# Patient Record
Sex: FEMALE | Race: WHITE | NOT HISPANIC OR LATINO | Employment: OTHER
[De-identification: names, ages, dates, MRNs, and addresses within clinical notes are randomized per-mention and may not be internally consistent; named-entity substitution may affect disease eponyms.]

---

## 2017-10-24 ENCOUNTER — DOCUMENTATION ONLY (OUTPATIENT)
Dept: DENTISTRY | Facility: CLINIC | Age: 32
End: 2017-10-24

## 2017-10-24 NOTE — PROGRESS NOTES
Referral to Preoperative Assessment Center from the [facility]    Reason for referral: Long QT syndrome, quadriplegic infantile cerebral palsy    Planned operating room procedures:  Bilateral dental exam, dental radiographs, periodontal therapy, dental restorations, dental extractions, biopsy, frenectomy, gingivectomy, alveoloplasty, fluoride varnish in the mouth.     Anesthesia: General, nasoendotracheal     Date of Surgery:  TBD    The dental care coordinator ([phone]), will call to coordinate the appointment.        Ara Urbano DDS   Clinical   Pager: [phone]

## 2017-12-29 ENCOUNTER — APPOINTMENT (OUTPATIENT)
Dept: SURGERY | Facility: CLINIC | Age: 32
End: 2017-12-29
Payer: MEDICARE

## 2017-12-29 ENCOUNTER — OFFICE VISIT (OUTPATIENT)
Dept: SURGERY | Facility: CLINIC | Age: 32
End: 2017-12-29
Payer: MEDICARE

## 2017-12-29 ENCOUNTER — ANESTHESIA EVENT (OUTPATIENT)
Dept: SURGERY | Facility: CLINIC | Age: 32
End: 2017-12-29
Payer: MEDICARE

## 2017-12-29 ENCOUNTER — ALLIED HEALTH/NURSE VISIT (OUTPATIENT)
Dept: SURGERY | Facility: CLINIC | Age: 32
End: 2017-12-29
Payer: MEDICARE

## 2017-12-29 VITALS
TEMPERATURE: 97.8 F | SYSTOLIC BLOOD PRESSURE: 101 MMHG | OXYGEN SATURATION: 95 % | HEIGHT: 60 IN | WEIGHT: 94 LBS | RESPIRATION RATE: 20 BRPM | BODY MASS INDEX: 18.46 KG/M2 | HEART RATE: 47 BPM | DIASTOLIC BLOOD PRESSURE: 62 MMHG

## 2017-12-29 DIAGNOSIS — Z01.818 PREOP EXAMINATION: Primary | ICD-10-CM

## 2017-12-29 RX ORDER — CEPHALEXIN 500 MG/1
500 CAPSULE ORAL 2 TIMES DAILY
COMMUNITY
Start: 2017-12-27 | End: 2018-01-06

## 2017-12-29 ASSESSMENT — ENCOUNTER SYMPTOMS: DYSRHYTHMIAS: 1

## 2017-12-29 NOTE — PATIENT INSTRUCTIONS
Preparing for Your Surgery      Name:  Robi Remy   MRN:  [id number]   :  1985   Today's Date:  2017     Arriving for surgery:  Surgery date:  17  Arrival time:  06:30  Please come to:     [facility] [address]  [address] [place]  [address]    - parking is available in front of [facility] from 5:15AM to 8:00PM. If you prefer, park your car in the Green Lot.    -Proceed to the 3rd floor, check in at the Adult Surgery Waiting Lounge. [phone]    If an escort is needed stop at the Information Desk in the lobby. Inform the information person that you are here for surgery. An escort to the Adult Surgery Waiting Lounge will be provided.        What can I eat or drink?  -  You may have solid food or milk products until 8 hours prior to your surgery.  -  You may have water, apple juice or 7up/Sprite until 2 hours prior to your surgery.    Which medicines can I take?  -  Do NOT take these medications in the morning, the day of surgery:  Aspirin and supplements x 7 days before surgery, ibuprofen x 2 days before surgery    -  Please take these medications the day of surgery:  Tylenol if needed, nadolol and keflex if normally taken in the morning    How do I prepare myself?  -  Take two showers: one the night before surgery; and one the morning of surgery.         Use Scrubcare or Hibiclens to wash from neck down.  You may use your own shampoo and conditioner. No other hair products.   -  Do NOT use lotion, powder, deodorant, or antiperspirant the day of your surgery.  -  Do NOT wear any makeup, fingernail polish or jewelry.  -  Begin using Incentive Spirometer 1 week prior to surgery.  Use 4 times per day, up to 5 breaths each time.  Bring Incentive Spirometer to hospital.  -Do not bring your own medications to the hospital, except for inhalers and eye drops.  -  Bring your ID and insurance card.    Questions or Concerns:  If you have  questions or concerns, please call the  Preoperative Assessment Center, Monday-Friday 7AM-7PM:  [phone]

## 2017-12-29 NOTE — MR AVS SNAPSHOT
After Visit Summary   2017    Robi Remy    MRN: [id number]           Patient Information     Date Of Birth          1985        Visit Information        Provider Department      2017 2:30 PM Rn, [facility] Preoperative Assessment Center        Care Instructions    Preparing for Your Surgery      Name:  Robi Remy   MRN:  [id number]   :  1985   Today's Date:  2017     Arriving for surgery:  Surgery date:  17  Arrival time:  06:30  Please come to:     [facility] [address]  [address] [place], [place] [address]. [place]  [address]    - parking is available in front of [facility] from 5:15AM to 8:00PM. If you prefer, park your car in the Green Lot.    -Proceed to the 3rd floor, check in at the Adult Surgery Waiting Lounge. [phone]    If an escort is needed stop at the Information Desk in the lobby. Inform the information person that you are here for surgery. An escort to the Adult Surgery Waiting Lounge will be provided.        What can I eat or drink?  -  You may have solid food or milk products until 8 hours prior to your surgery.  -  You may have water, apple juice or 7up/Sprite until 2 hours prior to your surgery.    Which medicines can I take?  -  Do NOT take these medications in the morning, the day of surgery:  Aspirin and supplements x 7 days before surgery, ibuprofen x 2 days before surgery    -  Please take these medications the day of surgery:  Tylenol if needed, nadolol and keflex if normally taken in the morning    How do I prepare myself?  -  Take two showers: one the night before surgery; and one the morning of surgery.         Use Scrubcare or Hibiclens to wash from neck down.  You may use your own shampoo and conditioner. No other hair products.   -  Do NOT use lotion, powder, deodorant, or antiperspirant the day of your surgery.  -  Do NOT wear any makeup, fingernail polish or jewelry.  -  Begin  using Incentive Spirometer 1 week prior to surgery.  Use 4 times per day, up to 5 breaths each time.  Bring Incentive Spirometer to hospital.  -Do not bring your own medications to the hospital, except for inhalers and eye drops.  -  Bring your ID and insurance card.    Questions or Concerns:  If you have questions or concerns, please call the  Preoperative Assessment Center, Monday-Friday 7AM-7PM:  [phone]                    Follow-ups after your visit        Your next 10 appointments already scheduled     Dec 29, 2017  2:30 PM CST   (Arrive by 2:15 PM)   PAC RN ASSESSMENT with  Pac Rn   [facility] Preoperative Assessment Center ([facility])    [address] [facility]  [address] [facility] [address] [phone]            Dec 29, 2017  3:10 PM CST   (Arrive by 2:55 PM)   PAC Anesthesia Consult with  Pac Anesthesiologist   [facility] Preoperative Assessment [place] ([facility])    [address] [place] [address]  [address]th [facility] [id number] [phone]            Dec 29, 2017  3:30 PM CST   LAB with  LAB   [facility] Lab ([facility])    [address] [place] [address]  1st [facility] [id number] [phone]           Please do not eat 10-12 hours before your appointment if you are coming in fasting for labs on lipids, cholesterol, or glucose (sugar). This does not apply to pregnant women. Water, hot tea and black coffee (with nothing added) are okay. Do not drink other fluids, diet soda or chew gum.            Jan 04, 2018   Procedure with Briseida Iyer MD   [facility] [place], Same Day Surgery (--)    [address] [facility] [address] [phone]              Who to contact     Please call your clinic at [phone] to:    Ask questions about your health    Make or cancel appointments    Discuss your medicines    Learn about your test results    Speak to your doctor   If you have compliments or concerns about an  experience at your clinic, or if you wish to file a complaint, please contact [facility] Patient Relations at [phone] or email us at [email or web address]         Additional Information About Your Visit        "[employer] Information     wufoo is an electronic gateway that provides easy, online access to your medical records. With wufoo, you can request a clinic appointment, read your test results, renew a prescription or communicate with your care team.     To sign up for wufoo visit the website at [email or web address]   You will be asked to enter the access code listed below, as well as some personal information. Please follow the directions to create your username and password.     Your access code is: [email or web address]  Expires: 3/15/2018  6:31 AM     Your access code will  in 90 days. If you need help or a new code, please contact your [facility] Clinic or call [phone] for assistance.        Care EveryWhere ID     This is your Care EveryWhere ID. This could be used by other organizations to access your [facility] medical records  [id number]         Blood Pressure from Last 3 Encounters:   17 101/62   14 108/75   10/06/14 109/64    Weight from Last 3 Encounters:   17 42.6 kg (94 lb)   14 38.6 kg (85 lb)   10/06/14 36.3 kg (80 lb)              Today, you had the following     No orders found for display       Primary Care Provider Office Phone # Fax #    Bill Beltrán [phone]       [facility] [address] [facility] [address] [facility] [address]        Equal Access to Services     DEJUAN BERG : Hadii mona Gallo, waaxda luqadaha, qaybta kaalmada judy, erinn long. So [facility] [phone].    ATENCIÓN: Si habla español, tiene a aranda disposición servicios gratuitos de asistencia lingüística. Llame al [phone].    We comply with applicable  federal civil rights laws and [place] laws. We do not discriminate on the basis of race, color, national origin, age, disability, sex, sexual orientation, or gender identity.            Thank you!     Thank you for choosing [facility] PREOPERATIVE ASSESSMENT CENTER  for your care. Our goal is always to provide you with excellent care. Hearing back from our patients is one way we can continue to improve our services. Please take a few minutes to complete the written survey that you may receive in the mail after your visit with us. Thank you!             Your Updated Medication List - Protect others around you: Learn how to safely use, store and throw away your medicines at www.disposemymeds.org.          This list is accurate as of: 12/29/17  2:28 PM.  Always use your most recent med list.                   Brand Name Dispense Instructions for use Diagnosis    cephALEXin 500 MG capsule    KEFLEX     Take 500 mg by mouth 2 times daily        docusate sodium 100 MG capsule    COLACE    180 capsule    Take 1 capsule (100 mg) by mouth 2 times daily    Constipation       GLYCOPYRROLATE PO      Take 2 mg by mouth 3 times daily        MULTIVITAMIN PO      Take 1 tablet by mouth every morning        NADOLOL PO      Take 20 mg by mouth every morning        polyethylene glycol powder    MIRALAX/GLYCOLAX     Take 1 capful by mouth daily        sodium chloride 0.9% (bottle) 0.9 % irrigation     5000 mL    Irrigate bladder with normal saline twice daily as directed.    Neurogenic bladder

## 2017-12-29 NOTE — H&P
Pre-Operative H & P     CC:  Preoperative exam to assess for increased cardiopulmonary risk while undergoing surgery and anesthesia.    Date of Encounter: December 29, 2017   Primary Care Physician:  Bill Beltrán  Robi Remy is a 32 year old female who presents for pre-operative H & P in preparation for a Pap Smear, Dental Exam, Radiographs, Dental Restorations, Periodontal Therapy, Dental Extractions, Biopsies on 1/4/18 with Dr. Briseida Iyer and Ara Urbano DDS at the [facility].      Robi Remy   has a past medical history of Cerebral palsy (H); Developmental delay; Long Q-T syndrome; Neurogenic bladder; Nonverbal; Self-catheterizes urinary bladder; and Spastic quadriplegia (H).  She is wheelchair bound and cannot perform any activities of daily living on her own.      History is obtained from the patient and medical record including Care Everywhere.        Past Surgical History  Past Surgical History:   Procedure Laterality Date     BACK SURGERY  2002    spine fusion     EXAM UNDER ANESTHESIA DENTAL  2010     LAPAROSCOPIC MITROFANOFF PROCEDURE (APPENDIX CONDUIT) N/A 9/4/2014    Procedure: LAPAROSCOPIC MITROFANOFF PROCEDURE (APPENDIX CONDUIT);  Surgeon: Naren Bustos MD;  Location: [facility] OR     LAPAROSCOPIC REPAIR BLADDER N/A 9/4/2014    Procedure: LAPAROSCOPIC REPAIR BLADDER;  Surgeon: Naren Bustos MD;  Location: [facility] OR       Personal or FH with difficulty with Anesthesia:  None    Prior to Admission Medications  Current Outpatient Prescriptions   Medication Sig Dispense Refill     cephALEXin (KEFLEX) 500 MG capsule Take 500 mg by mouth 2 times daily       docusate sodium (COLACE) 100 MG capsule Take 1 capsule (100 mg) by mouth 2 times daily 180 capsule 1     sodium chloride 0.9%, bottle, 0.9 % irrigation Irrigate bladder with normal saline twice daily as directed. 5000 mL 0     GLYCOPYRROLATE PO Take 2 mg by mouth 3 times  daily       Multiple Vitamins-Minerals (MULTIVITAMIN OR) Take 1 tablet by mouth every morning        polyethylene glycol (MIRALAX/GLYCOLAX) powder Take 1 capful by mouth daily       NADOLOL PO Take 20 mg by mouth every morning            Allergies  Tape [adhesive tape]     Social History  Social History     Social History     Marital status: Single     Spouse name: N/A     Number of children: N/A     Years of education: N/A     Occupational History     Not on file.     Social History Main Topics     Smoking status: Never Smoker     Smokeless tobacco: Never Used     Alcohol use No     Drug use: No     Sexual activity: Not on file     Other Topics Concern     Not on file     Social History Narrative          Family History  Family History   Problem Relation Age of Onset     HEART DISEASE Father      long QT syndrome         ROS   The complete review of systems is negative other than noted in the HPI or here.   Constitutional: No  fevers/chills.    EENT: NO difficulty swallowing.  GI: No nausea/vomiting     : UTI being treated with Cephalexin  Hematologic: No anemia or blood clot history  Neurologic: No history of stroke, TIA, migraines, seizures, d  Psychiatric: No changes in mood or affect.      Cardiology Tests: (personally reviewed):   Review Results Below in A/P    Labs: (personally reviewed):  Lab Results   Component Value Date    WBC 13.1 (H) 09/07/2014    HGB 10.8 (L) 09/07/2014    HCT 32.9 (L) 09/07/2014     09/07/2014     09/07/2014    POTASSIUM 3.7 09/07/2014    BRYAN 8.5 09/07/2014     (H) 09/07/2014    CR 0.52 09/07/2014    BUN 20 09/07/2014    CO2 26 09/07/2014           Physical Exam:  No LMP recorded. Patient is not currently having periods (Reason: IUD).   Vital signs:  /62  Pulse (!) 47  Temp 97.8  F (36.6  C) (Axillary)  Resp 20  Ht 1.524 m (5')  Wt 42.6 kg (94 lb)  SpO2 95%  BMI 18.36 kg/m2    Constitutional: Awake, alert, cooperative, no apparent distress, and  appears stated age.  Eyes:  sclera clear, conjunctiva normal.  Respiratory: Clear to auscultation bilaterally, no crackles or wheezing.  Cardiovascular: Regular rate and rhythm, no overt murmur noted.    GI: Normal bowel sounds, soft, non-distended, non-tender  Skin: Warm and dry.  No rashes at anticipated surgical site.   Musculoskeletal: Wheelchair bound    Neurologic: Awake, alert,   Neuropsychiatric: Calm, cooperative. Normal affect.     Assessment/Plan  Robi Remy is a 32 year old female who presents for pre-operative H & P in preparation for a Pap Smear, Dental Exam, Radiographs, Dental Restorations, Periodontal Therapy, Dental Extractions, Biopsies on 1/4/18 with Dr. Briseida Iyer and Ara Urbano DDS at the [facility].    PAC referral for risk assessment and optimization for anesthesia with comorbid conditions of:    Pre-operative considerations:  1.  Cardiac:     Risk of Major Adverse Cardiac event: 0.4%  -Long QT syndrome, takes nadolol (cont DOS)  2.  Neuro:  -Severe cerebral palsy with contractures, nonverbal and developmental delay, wheelchair bound  3.  Pulm:   DANIEL: low risk  4.  GI:  Risk of PONV score =2 .  If > 2, anti-emetic intervention recommended.    Patient is optimized and is an acceptable candidate for the proposed procedure.  No further diagnostic evaluation is needed.      AVS given to patient regarding medication instructions,  surgery time/arrival time and NPO status.  Celeste PACEC   Preoperative Assessment Center  [facility]  Clinic and Surgery Center  Phone: [phone]  Fax: [phone]

## 2017-12-29 NOTE — ANESTHESIA PREPROCEDURE EVALUATION
Anesthesia Evaluation     . Pt has had prior anesthetic. Type: General    No history of anesthetic complications          ROS/MED HX    ENT/Pulmonary:  - neg pulmonary ROS     Neurologic:     (+)Developmental delay  level of function: nonverbal other neuro severe cerebral palsy, spinal fusion 2002   Spinal cord injury: Non-verbal, upper and lower libs contracted, wheel chair bound.   Cardiovascular:     (+) ----. : . . . :. dysrhythmias Long QT, . No previous cardiac testing       METS/Exercise Tolerance: Comment: Patient not capable of doing any activities of daily living on her own 1 - Eating, dressing   Hematologic:  - neg hematologic  ROS       Musculoskeletal:   (+) , , other musculoskeletal- contractures      GI/Hepatic: Comment: Chronic constipation, poor oral intake - neg GI/hepatic ROS       Renal/Genitourinary:     (+) Other Renal/ Genitourinary, neurogenic bladder, current UTI being treated with cephalexin      Endo:  - neg endo ROS       Psychiatric:  - neg psychiatric ROS       Infectious Disease: Comment: Recent UTI's but none since June, had nephrostomy tube;  - neg infectious disease ROS      (-) Recent Fever   Malignancy:      - no malignancy   Other:    (+) other significant disability Wheelchair bound and Developmental delay                   Physical Exam  Normal systems: cardiovascular and pulmonary    Airway     Dental     Cardiovascular   Rhythm and rate: regular and normal      Pulmonary    breath sounds clear to auscultation               PAC Discussion and Assessment    ASA Classification: 3  Case is suitable for: [facility]  Anesthetic techniques and relevant risks discussed: GA  Invasive monitoring and risk discussed:   Types:   Possibility and Risk of blood transfusion discussed:   NPO instructions given:   Additional anesthetic preparation and risks discussed:   Needs early admission to pre-op area:   Other:     PAC Resident/NP Anesthesia Assessment:  Robi Remy is a 32 year old  female who presents for pre-operative H & P in preparation for a Pap Smear, Dental Exam, Radiographs, Dental Restorations, Periodontal Therapy, Dental Extractions, Biopsies on 1/4/18 with Dr. Briseida Iyer and Ara Urbano DDS at the [facility].    PAC referral for risk assessment and optimization for anesthesia with comorbid conditions of:    Pre-operative considerations:  1.  Cardiac:     Risk of Major Adverse Cardiac event: 0.4%  -Long QT syndrome, takes nadolol (cont DOS)  2.  Neuro:  -Severe cerebral palsy with contractures, nonverbal and developmental delay, wheelchair bound  3.  Pulm:   DANIEL: low risk  4.  GI:  Risk of PONV score =2 .  If > 2, anti-emetic intervention recommended.    Patient is optimized and is an acceptable candidate for the proposed procedure.  No further diagnostic evaluation is needed.    Patient also evaluated by Dr. Jaime. See recommendations below.     Celeste Kumar MS, PA-C  12/29/17 2:41 PM        Mid-Level Provider/Resident:   Date:   Time:     Attending Anesthesiologist Anesthesia Assessment:  32 year old for dental work and PAP smear. Chart reviewed, patient seen and evaluated; agree with above assessment. Patient has no significant cardiac or pulmonary disease. Tolerates IVs according to parents.    Patient is appropriate for the planned procedure without further workup or medical management change. The final anesthesia plan will be determined by the physician anesthesiologist caring for the patient on the day of surgery.    Reviewed and Signed by PAC Anesthesiologist  Anesthesiologist: anton  Date: 12/29/2017  Time:   Pass/Fail: Pass  Disposition:     PAC Pharmacist Assessment:        Pharmacist:   Date:   Time:      Anesthesia Plan      History & Physical Review  History and physical reviewed and following examination; no interval change.    ASA Status:  3 .        Plan for General with Propofol induction. Maintenance will be Balanced.     PONV prophylaxis:  Dexamethasone or Solumedrol  Additional equipment: Videolaryngoscope NO ZOFRAN      Postoperative Care  Postoperative pain management:  IV analgesics.      Consents  Anesthetic plan, risks, benefits and alternatives discussed with:  Parent (Mother and/or Father).  Use of blood products discussed: No .   .                          .

## 2018-01-04 ENCOUNTER — HOSPITAL ENCOUNTER (OUTPATIENT)
Facility: CLINIC | Age: 33
Discharge: HOME | End: 2018-01-04
Attending: DENTIST | Admitting: DENTIST
Payer: MEDICARE

## 2018-01-04 ENCOUNTER — ANESTHESIA (OUTPATIENT)
Dept: SURGERY | Facility: CLINIC | Age: 33
End: 2018-01-04
Payer: MEDICARE

## 2018-01-04 VITALS
WEIGHT: 88.85 LBS | OXYGEN SATURATION: 98 % | BODY MASS INDEX: 17.44 KG/M2 | SYSTOLIC BLOOD PRESSURE: 141 MMHG | RESPIRATION RATE: 11 BRPM | HEIGHT: 60 IN | TEMPERATURE: 97.2 F | DIASTOLIC BLOOD PRESSURE: 98 MMHG

## 2018-01-04 DIAGNOSIS — N63.25 BREAST LUMP ON LEFT SIDE AT 9 O'CLOCK POSITION: Primary | ICD-10-CM

## 2018-01-04 DIAGNOSIS — K05.6 PERIODONTAL DISEASE: Primary | ICD-10-CM

## 2018-01-04 PROCEDURE — 71000014 ZZH RECOVERY PHASE 1 LEVEL 2 FIRST HR: Performed by: OBSTETRICS & GYNECOLOGY

## 2018-01-04 PROCEDURE — 25000128 H RX IP 250 OP 636: Performed by: DENTIST

## 2018-01-04 PROCEDURE — A9270 NON-COVERED ITEM OR SERVICE: HCPCS | Performed by: NURSE ANESTHETIST, CERTIFIED REGISTERED

## 2018-01-04 PROCEDURE — 25000565 ZZH ISOFLURANE, EA 15 MIN: Performed by: OBSTETRICS & GYNECOLOGY

## 2018-01-04 PROCEDURE — A9270 NON-COVERED ITEM OR SERVICE: HCPCS | Mod: GY | Performed by: DENTIST

## 2018-01-04 PROCEDURE — 40000170 ZZH STATISTIC PRE-PROCEDURE ASSESSMENT II: Performed by: OBSTETRICS & GYNECOLOGY

## 2018-01-04 PROCEDURE — 27210794 ZZH OR GENERAL SUPPLY STERILE: Performed by: OBSTETRICS & GYNECOLOGY

## 2018-01-04 PROCEDURE — 37000009 ZZH ANESTHESIA TECHNICAL FEE, EACH ADDTL 15 MIN: Performed by: OBSTETRICS & GYNECOLOGY

## 2018-01-04 PROCEDURE — 87624 HPV HI-RISK TYP POOLED RSLT: CPT | Performed by: OBSTETRICS & GYNECOLOGY

## 2018-01-04 PROCEDURE — 37000008 ZZH ANESTHESIA TECHNICAL FEE, 1ST 30 MIN: Performed by: OBSTETRICS & GYNECOLOGY

## 2018-01-04 PROCEDURE — 36000053 ZZH SURGERY LEVEL 2 EA 15 ADDTL MIN - UMMC: Performed by: OBSTETRICS & GYNECOLOGY

## 2018-01-04 PROCEDURE — 71000027 ZZH RECOVERY PHASE 2 EACH 15 MINS: Performed by: OBSTETRICS & GYNECOLOGY

## 2018-01-04 PROCEDURE — G0476 HPV COMBO ASSAY CA SCREEN: HCPCS | Performed by: OBSTETRICS & GYNECOLOGY

## 2018-01-04 PROCEDURE — 25000125 ZZHC RX 250: Performed by: OBSTETRICS & GYNECOLOGY

## 2018-01-04 PROCEDURE — 36000051 ZZH SURGERY LEVEL 2 1ST 30 MIN - UMMC: Performed by: OBSTETRICS & GYNECOLOGY

## 2018-01-04 PROCEDURE — C9399 UNCLASSIFIED DRUGS OR BIOLOG: HCPCS | Performed by: NURSE ANESTHETIST, CERTIFIED REGISTERED

## 2018-01-04 PROCEDURE — 25000128 H RX IP 250 OP 636: Performed by: NURSE ANESTHETIST, CERTIFIED REGISTERED

## 2018-01-04 PROCEDURE — 25000125 ZZHC RX 250: Performed by: DENTIST

## 2018-01-04 PROCEDURE — 25000132 ZZH RX MED GY IP 250 OP 250 PS 637: Mod: GY | Performed by: DENTIST

## 2018-01-04 PROCEDURE — G0145 SCR C/V CYTO,THINLAYER,RESCR: HCPCS | Performed by: OBSTETRICS & GYNECOLOGY

## 2018-01-04 PROCEDURE — 25000125 ZZHC RX 250: Performed by: NURSE ANESTHETIST, CERTIFIED REGISTERED

## 2018-01-04 DEVICE — IUD CONTRACEPTIVE DEVICE MIRENA 50419-4230-01
Type: IMPLANTABLE DEVICE | Site: UTERUS | Status: NON-FUNCTIONAL
Removed: 2024-03-13

## 2018-01-04 RX ORDER — CEFAZOLIN SODIUM 2 G/100ML
2 INJECTION, SOLUTION INTRAVENOUS
Status: COMPLETED | OUTPATIENT
Start: 2018-01-04 | End: 2018-01-04

## 2018-01-04 RX ORDER — LIDOCAINE HYDROCHLORIDE 20 MG/ML
INJECTION, SOLUTION INFILTRATION; PERINEURAL PRN
Status: DISCONTINUED | OUTPATIENT
Start: 2018-01-04 | End: 2018-01-04

## 2018-01-04 RX ORDER — FENTANYL CITRATE 50 UG/ML
25-50 INJECTION, SOLUTION INTRAMUSCULAR; INTRAVENOUS
Status: DISCONTINUED | OUTPATIENT
Start: 2018-01-04 | End: 2018-01-04 | Stop reason: HOSPADM

## 2018-01-04 RX ORDER — SODIUM CHLORIDE, SODIUM LACTATE, POTASSIUM CHLORIDE, CALCIUM CHLORIDE 600; 310; 30; 20 MG/100ML; MG/100ML; MG/100ML; MG/100ML
INJECTION, SOLUTION INTRAVENOUS CONTINUOUS
Status: DISCONTINUED | OUTPATIENT
Start: 2018-01-04 | End: 2018-01-04 | Stop reason: HOSPADM

## 2018-01-04 RX ORDER — CHLORHEXIDINE GLUCONATE ORAL RINSE 1.2 MG/ML
15 SOLUTION DENTAL 2 TIMES DAILY
Qty: 420 ML | Refills: 0 | Status: SHIPPED | OUTPATIENT
Start: 2018-01-04 | End: 2018-01-18

## 2018-01-04 RX ORDER — CHLORHEXIDINE GLUCONATE ORAL RINSE 1.2 MG/ML
SOLUTION DENTAL PRN
Status: DISCONTINUED | OUTPATIENT
Start: 2018-01-04 | End: 2018-01-04 | Stop reason: HOSPADM

## 2018-01-04 RX ORDER — OXYMETAZOLINE HYDROCHLORIDE 0.05 G/100ML
SPRAY NASAL PRN
Status: DISCONTINUED | OUTPATIENT
Start: 2018-01-04 | End: 2018-01-04

## 2018-01-04 RX ORDER — NALOXONE HYDROCHLORIDE 0.4 MG/ML
.1-.4 INJECTION, SOLUTION INTRAMUSCULAR; INTRAVENOUS; SUBCUTANEOUS
Status: DISCONTINUED | OUTPATIENT
Start: 2018-01-04 | End: 2018-01-04 | Stop reason: HOSPADM

## 2018-01-04 RX ORDER — FENTANYL CITRATE 50 UG/ML
INJECTION, SOLUTION INTRAMUSCULAR; INTRAVENOUS PRN
Status: DISCONTINUED | OUTPATIENT
Start: 2018-01-04 | End: 2018-01-04

## 2018-01-04 RX ORDER — BACITRACIN ZINC 500 [USP'U]/G
OINTMENT TOPICAL PRN
Status: DISCONTINUED | OUTPATIENT
Start: 2018-01-04 | End: 2018-01-04 | Stop reason: HOSPADM

## 2018-01-04 RX ORDER — PROPOFOL 10 MG/ML
INJECTION, EMULSION INTRAVENOUS PRN
Status: DISCONTINUED | OUTPATIENT
Start: 2018-01-04 | End: 2018-01-04

## 2018-01-04 RX ORDER — CEFAZOLIN SODIUM 1 G/3ML
1 INJECTION, POWDER, FOR SOLUTION INTRAMUSCULAR; INTRAVENOUS SEE ADMIN INSTRUCTIONS
Status: DISCONTINUED | OUTPATIENT
Start: 2018-01-04 | End: 2018-01-04 | Stop reason: HOSPADM

## 2018-01-04 RX ORDER — DEXAMETHASONE SODIUM PHOSPHATE 4 MG/ML
INJECTION, SOLUTION INTRA-ARTICULAR; INTRALESIONAL; INTRAMUSCULAR; INTRAVENOUS; SOFT TISSUE PRN
Status: DISCONTINUED | OUTPATIENT
Start: 2018-01-04 | End: 2018-01-04

## 2018-01-04 RX ORDER — SODIUM CHLORIDE, SODIUM LACTATE, POTASSIUM CHLORIDE, CALCIUM CHLORIDE 600; 310; 30; 20 MG/100ML; MG/100ML; MG/100ML; MG/100ML
INJECTION, SOLUTION INTRAVENOUS CONTINUOUS PRN
Status: DISCONTINUED | OUTPATIENT
Start: 2018-01-04 | End: 2018-01-04

## 2018-01-04 RX ORDER — KETOROLAC TROMETHAMINE 30 MG/ML
INJECTION, SOLUTION INTRAMUSCULAR; INTRAVENOUS PRN
Status: DISCONTINUED | OUTPATIENT
Start: 2018-01-04 | End: 2018-01-04

## 2018-01-04 RX ADMIN — SODIUM CHLORIDE, POTASSIUM CHLORIDE, SODIUM LACTATE AND CALCIUM CHLORIDE: 600; 310; 30; 20 INJECTION, SOLUTION INTRAVENOUS at 07:49

## 2018-01-04 RX ADMIN — OXYMETAZOLINE HYDROCHLORIDE 3 SPRAY: 5 SPRAY NASAL at 07:55

## 2018-01-04 RX ADMIN — FENTANYL CITRATE 100 MCG: 50 INJECTION, SOLUTION INTRAMUSCULAR; INTRAVENOUS at 07:53

## 2018-01-04 RX ADMIN — PROPOFOL 120 MG: 10 INJECTION, EMULSION INTRAVENOUS at 07:53

## 2018-01-04 RX ADMIN — MIDAZOLAM 0.5 MG: 1 INJECTION INTRAMUSCULAR; INTRAVENOUS at 07:49

## 2018-01-04 RX ADMIN — ROCURONIUM BROMIDE 30 MG: 10 INJECTION INTRAVENOUS at 07:53

## 2018-01-04 RX ADMIN — KETOROLAC TROMETHAMINE 15 MG: 30 INJECTION, SOLUTION INTRAMUSCULAR at 10:05

## 2018-01-04 RX ADMIN — MIDAZOLAM 1 MG: 1 INJECTION INTRAMUSCULAR; INTRAVENOUS at 07:40

## 2018-01-04 RX ADMIN — CEFAZOLIN SODIUM 2 G: 2 INJECTION, SOLUTION INTRAVENOUS at 08:35

## 2018-01-04 RX ADMIN — DEXAMETHASONE SODIUM PHOSPHATE 4 MG: 4 INJECTION, SOLUTION INTRAMUSCULAR; INTRAVENOUS at 08:50

## 2018-01-04 RX ADMIN — LIDOCAINE HYDROCHLORIDE 60 MG: 20 INJECTION, SOLUTION INFILTRATION; PERINEURAL at 07:53

## 2018-01-04 RX ADMIN — SUGAMMADEX 80 MG: 100 INJECTION, SOLUTION INTRAVENOUS at 09:54

## 2018-01-04 NOTE — IP AVS SNAPSHOT
[facility]    [address] [facility] [address]    Phone:  [phone]                                       After Visit Summary   1/4/2018    Robi Remy    MRN: [id number]           After Visit Summary Signature Page     I have received my discharge instructions, and my questions have been answered. I have discussed any challenges I see with this plan with the nurse or doctor.    ..........................................................................................................................................  Patient/Patient Representative Signature      ..........................................................................................................................................  Patient Representative Print Name and Relationship to Patient    ..................................................               ................................................  Date                                            Time    ..........................................................................................................................................  Reviewed by Signature/Title    ...................................................              ..............................................  Date                                                            Time

## 2018-01-04 NOTE — OP NOTE
[facility]  Operative Note    Patient: Robi Remy  : 1985  MRN: [id number]    Date of Service: 2018    Pre-operative diagnosis:  Due for breast, pap, pelvic exams  Due for Mirena IUD exchange  Cerebral palsy with contractures    Post-operative diagnosis:  Same, s/p below procedure    Procedure:   Exam under anesthesia  Bilateral breast exam  Pelvic exam  Removal and replacement of Mirena IUD    Surgeon: Briseida Iyer MD  Assistants: Leyla Gray MD G4; Elham Murillo MD G1    Anesthesia: General    EBL: 1  mL  Urine: not measured  Fluids: 500 mL cystalloid    Specimens: Mirena IUD  Complications: none    Findings: Breast exam with normal appearing, symmetric breasts and nipples. There is a small 0.5 cm nodule palpated at 0900 on the right breast that is well-circumscribed and mobile. There is a 3 x 2 cm fibrocystic mass palpated at about 0900 on the left breast that is mobile. Breasts overall dense. Outpatient breast imaging was recommended. Bimanual exam with anteverted, small, mobile uterus, no adnexal masses palpated. Speculum exam with normal appearing cervix.     IUD Lot #: [id number]  Exp:     Indications: Robi Remy is a 32 year old female with cerebral palsy and significant contractures who was due for dental exam, breast exam, pap smear, and replacement of Mirena IUD. Due to significant contractures and inability to tolerate these exams in the office, she was scheduled for the above procedures under anesthesia. Discussed risks, benefits, and alternatives. The patient's questions were answered, understanding confirmed, and consent obtained.     Technique: The patient was taken to the OR and SCDs placed. After the time-out, a breast exam was performed. The findings are noted above. Next her pelvis was elevated by placing an upside-down bed pan under her buttocks. Her legs were manually elevated to allow exposure. A Bimanual exam was performed. A medium dakota speculum was placed.  The IUD strings were grasped and the IUD removed without difficulty. The pap smear was completed. The cervix was swabbed with betadine x3. A single tooth tenaculum was placed on the anterior lip and the uterus was sounded to 7.5 cm. The Mirena IUD was placed in the usual manner without any difficulty. The strings were trimmed to 2 cm. The single tooth tenaculum was removed and good hemostasis visualized. The speculum was removed. IUD was placed under US guidance.    Dr. Iyer was present for the entire procedure. The patient tolerated the procedure well. Remainder of the case was turned over to the dental service.     Leyla Gray MD PGY4  Department of OB/GYN  1/4/2018 10:36 AM     I was present and scrubbed for entire procedure.   Briseida Iyer MD

## 2018-01-04 NOTE — DISCHARGE INSTRUCTIONS
Same-Day Surgery   Adult Discharge Orders & Instructions     For 24 hours after surgery:  1. Get plenty of rest.  A responsible adult must stay with you for at least 24 hours after you leave the hospital.   2. Pain medication can slow your reflexes. Do not drive or use heavy equipment.  If you have weakness or tingling, don't drive or use heavy equipment until this feeling goes away.  3. Mixing alcohol and pain medication can cause dizziness and slow your breathing. It can even be fatal. Do not drink alcohol while taking pain medication.  4. Avoid strenuous or risky activities.  Ask for help when climbing stairs.   5. You may feel lightheaded.  If so, sit for a few minutes before standing.  Have someone help you get up.   6. If you have nausea (feel sick to your stomach), drink only clear liquids such as apple juice, ginger ale, broth or 7-Up.  Rest may also help.  Be sure to drink enough fluids.  Move to a regular diet as you feel able. Take pain medications with a small amount of solid food, such as toast or crackers, to avoid nausea.   7. A slight fever is normal. Call the doctor if your fever is over 100 F (37.7 C) (taken under the tongue) or lasts longer than 24 hours.  8. You may have a dry mouth, muscle aches, trouble sleeping or a sore throat.  These symptoms should go away after 24 hours.  9. Do not make important or legal decisions.   Pain Management:      1. Take pain medication (if prescribed) for pain as directed by your physician.        2. WARNING: If the pain medication you have been prescribed contains Tylenol  (acetaminophen), DO NOT take additional doses of Tylenol (acetaminophen).     Call your doctor for any of the followin.  Signs of infection (fever, growing tenderness at the surgery site, severe pain, a large amount of drainage or bleeding, foul-smelling drainage, redness, swelling).    2.  It has been over 8 to 10 hours since surgery and you are still not able to urinate (pee).    3.   Headache for over 24 hours.    4.  Numbness, tingling or weakness the day after surgery (if you had spinal anesthesia).  To contact a doctor, call _____________________________________ or:      [phone] and ask for the Resident On Call for:          __________________________________________ (answered 24 hours a day)      Emergency Department:  [place] [facility]: [phone]  [place] [facility]: [phone]

## 2018-01-04 NOTE — ANESTHESIA POSTPROCEDURE EVALUATION
Patient: Robi Remy    Procedure(s):  Pap Smear, Breast exam, Mirena IUD removal and replacement.  Dental Exam, Radiographs, Dental Restorations, Periodontal Therapy, Dental Extractions, Biopsies    - Wound Class: I-Clean  Dental exam, x- rays,  - Wound Class: II-Clean Contaminated  Mirena IUD removal and replacement - Wound Class: I-Clean    Diagnosis:Dental Caries and Periodontal Disease   Diagnosis Additional Information: No value filed.    Anesthesia Type:  General    Note:  Anesthesia Post Evaluation    Patient location during evaluation: PACU  Patient participation: Unable to participate in evaluation secondary to underlying medical condition  Level of consciousness: awake and alert  Pain management: adequate  Airway patency: patent  Cardiovascular status: acceptable  Respiratory status: acceptable  Hydration status: acceptable  PONV: none     Anesthetic complications: None          Last vitals:  Vitals:    01/04/18 0639   BP: 106/82   Resp: 16   Temp: 37  C (98.6  F)   SpO2: 98%         Electronically Signed By: Favio Bradford MD, MD  January 4, 2018  9:06 AM

## 2018-01-04 NOTE — BRIEF OP NOTE
[facility] [place]    Brief Operative Note    Pre-operative diagnosis: Dental Caries and Periodontal Disease   Post-operative diagnosis Periodontal disease  Procedure: Procedure(s):  Pap Smear, Breast exam, Mirena IUD removal and replacement.  Dental Exam, Radiographs, Dental Restorations, Periodontal Therapy, Dental Extractions, Biopsies    - Wound Class: I-Clean  Dental exam, x- rays, periodontal therapy and flouride varnish - Wound Class: II-Clean Contaminated  Mirena IUD removal and replacement - Wound Class: I-Clean  Surgeon: Surgeon(s) and Role:  Panel 1:     * Briseida Iyer MD - Primary     * Leyla Gray MD - Resident - Assisting    Panel 2:     * Ara Urbano DDS - Primary     * Meme Asencio DDS - Resident - Assisting  Anesthesia: General NETT  Estimated blood loss: 5 mL  Drains: None  Specimens:   ID Type Source Tests Collected by Time Destination   A :  Brushing Cervix HPV HIGH RISK TYPES DNA CERVICAL, A PAP THIN LAYER SCREEN Briseida Iyer MD 1/4/2018  8:15 AM      Findings:   None.  Complications: None.  Implants: None.  The patient was extubated in the OR and taken to the PACU in good condition.

## 2018-01-04 NOTE — ANESTHESIA CARE TRANSFER NOTE
Patient: Robi Remy    Procedure(s):  Pap Smear, Breast exam, Mirena IUD removal and replacement.  Dental Exam, Radiographs, Dental Restorations, Periodontal Therapy, Dental Extractions, Biopsies    - Wound Class: I-Clean  Dental exam, x- rays, periodontal therapy and flouride varnish - Wound Class: II-Clean Contaminated  Mirena IUD removal and replacement - Wound Class: I-Clean    Diagnosis: Dental Caries and Periodontal Disease   Diagnosis Additional Information: No value filed.    Anesthesia Type:   General     Note:  Airway :Face Mask  Patient transferred to:PACU  Handoff Report: Identifed the Patient, Identified the Reponsible Provider, Reviewed the pertinent medical history, Discussed the surgical course, Reviewed Intra-OP anesthesia mangement and issues during anesthesia, Set expectations for post-procedure period and Allowed opportunity for questions and acknowledgement of understanding      Vitals: (Last set prior to Anesthesia Care Transfer)    CRNA VITALS  1/4/2018 0956 - 1/4/2018 1032      1/4/2018             Pulse: 82    SpO2: 99 %    Resp Rate (observed): 10                Electronically Signed By: LINDA Craven CRNA  January 4, 2018  10:32 AM

## 2018-01-04 NOTE — PROGRESS NOTES
Mitrofanoff last used at 0500. Mom reports during the day, it is accessed every 4-5 hours. OR nurse aware and will cath

## 2018-01-04 NOTE — BRIEF OP NOTE
[facility] OB/GYN Brief Operative Note    Pre-operative diagnosis: Due for breast, pap, pelvic exams  Due for Mirena IUD exchange  Cerebral palsy with contractures     Post-operative diagnosis: Same  S/p below procedure     Procedure: Procedure(s):  Exam under anesthesia  Bilateral breast exam  Pelvic exam  Removal and replacement of Mirena IUD     Surgeon: Dr. Iyer   Assistant(s): Leyla Gray MD G4, Elham Murillo MD G1      Anesthesia: General endotracheal anesthesia     Estimated blood loss: 1 mL   Total IV fluids: 500 mL, crystalloid   Blood transfusion: no transfusion was given during surgery   Total urine output: Not emptied   Drains: none   Specimens: Mirena IUD   Implants: none   Complications: None apparent    Condition: Patient taken to recovery in stable condition.     Findings: Breast exam with normal appearing, symmetric breasts and nipples. There is a small 0.5 cm nodule palpated at 0900 on the right breast that is well-circumscribed and mobile. There is a 3 x 2 cm fibrocystic mass palpated at about 0900 on the left breast that is mobile. Breasts overall dense. Bimanual exam with anteverted, small, mobile uterus, no adnexal masses palpated. Speculum exam with normal appearing cervix.     IUD Lot #: [id number]  Exp: 06/20    Patient stable at end of procedure.      Leyla Gray MD 1/4/2018 8:28 AM   OB/GYN Resident G4

## 2018-01-04 NOTE — IP AVS SNAPSHOT
MRN:[id number]                      After Visit Summary   1/4/2018    Robi Remy    MRN: [id number]           Thank you!     Thank you for choosing [facility] for your care. Our goal is always to provide you with excellent care. Hearing back from our patients is one way we can continue to improve our services. Please take a few minutes to complete the written survey that you may receive in the mail after you visit with us. Thank you!        Patient Information     Date Of Birth          1985        About your hospital stay     You were admitted on:  January 4, 2018 You last received care in the:   PACU    You were discharged on:  January 4, 2018       Who to Call     For medical emergencies, please call 911.  For non-urgent questions about your medical care, please call your primary care provider or clinic, [phone]  For questions related to your surgery, please call your surgery clinic        Attending Provider     Provider Ara Sharma DDS Dentist       Primary Care Provider Office Phone # Fax #    Bill Beltrán [phone]      After Care Instructions     Discharge Instructions       Return to [facility] dental clinic in 6- 12 months for recall and follow up.  Call the dental clinic to schedule the appointment.  Clinic phone: [phone]  Emergency post op care (after business hours and weekends) call: [phone], and ask for the dental resident on call.    Procedures Performed Today (January 4, 2018)  Dental exam, dental x-rays, cleaning, fluoride varnish.     Post-operative oral surgery instructions  Care of the mouth following a surgical procedure is essential in the healing process.  There is a certain amount of swelling, discoloration, discomfort and bleeding which can be expected.     Swelling:  Some degree of swelling is normal and can be minimized with the use of ice or cold packs applied to the area for 15-20 minutes and then removed for  15-20 minutes.  This should only be done for the first 24 hours, after 24 hours use a warm moist compress over the area for 15-20 minutes and then remove for 15-20 minutes.  Sit upright and keep your head elevated when sleeping.  Maximum swelling will occur about the second or third post-operative day and then slowly recede. Once present, it can remain swollen for up to 7 days and discomfort may persist for 10 days.    Discomfort:   A variable amount of pain follows extraction and oral surgery procedures. Tylenol, ibuprofen, or any over the counter pain medication can be used. In some cases, prescription pain medication will be given which  should be taken exactly as directed, and taken before the local anesthetic wears off. If pain increases for more than 3 days call the clinic.   Do not take pain medication on an empty stomach as it may cause nausea.     Bleeding:  Some bleeding and oozing is to be expected for several hours.  Avoid spitting, rinsing, swishing, and use of a straw for the next 24-48 hours, as they may provoke oozing.  If bleeding is visible then place a moistened gauze pack over the area and keep firm pressure on the gauze pack for 30 minutes and then discard.     Discoloration:   Facial discoloration (black and blue bruising) often follows oral surgery procedures. Discoloration is normal and is no cause for alarm. It may persist for as long as several weeks.    Jaw Stiffness:   For several days following most oral surgical procedures, the jaw may become somewhat stiff. Should jaw stiffness worsen after 2  weeks, call the clinic.    Nausea:    Nausea is common after surgery, and it is sometimes caused by pain medicines. Nausea may be reduced by preceding each pill with a small amount of soft food, then taking the pill with a large volume of water. Continue consuming clear fluids and minimize the pain medication. Call if you don't feel better or if vomiting is a problem. Soft drinks that have less  carbonation may help with nausea.    Care of the mouth:  A day following surgery rinse with warm salt water after each meal or 3-4 times a day (One half teaspoonful of table salt in a full glass of warm water). Resume normal oral hygiene (brushing and flossing) within 24 hours after procedure.  Clean your teeth within the bounds of comfort.   Avoid use of alcohol, smoking, or carbonated drinks for 24-48 hours after surgery.  This may slow the healing process.      Diet:  A soft or liquid diet is recommended for the first few days following surgery, advance as tolerated. Until local anesthesia (numbness) wears off, be careful chewing to prevent biting the numb area. Eat soft and liquid foods such as yogurt, cottage cheese, soup etc. Try not to skip a meal, and keep up normal diet.    Rest:  Rest as much as possible for the next 24 hours, avoiding any excessive amount of physical activity.     Fluoride Varnish:  A 5% sodium fluoride varnish was placed over the teeth for the prevention of dental decay.  The varnish hardens on contact with saliva so the teeth may appear spotty or as if there is a thin film coating the teeth, this is normal.  The varnish should remain on your teeth for at least 4-6 hours for the maximum effect.  It is recommended that you only eat soft foods and drink cold liquids during this time, do not eat or drink anything hot and do not brush your teeth the day of your surgery.  The varnish with naturally wear away, and can be brushed off the next day.     Note:  1. Return to work or school in 24-48 hours                  Further instructions from your care team       Same-Day Surgery   Adult Discharge Orders & Instructions     For 24 hours after surgery:  1. Get plenty of rest.  A responsible adult must stay with you for at least 24 hours after you leave the hospital.   2. Pain medication can slow your reflexes. Do not drive or use heavy equipment.  If you have weakness or tingling, don't drive or  use heavy equipment until this feeling goes away.  3. Mixing alcohol and pain medication can cause dizziness and slow your breathing. It can even be fatal. Do not drink alcohol while taking pain medication.  4. Avoid strenuous or risky activities.  Ask for help when climbing stairs.   5. You may feel lightheaded.  If so, sit for a few minutes before standing.  Have someone help you get up.   6. If you have nausea (feel sick to your stomach), drink only clear liquids such as apple juice, ginger ale, broth or 7-Up.  Rest may also help.  Be sure to drink enough fluids.  Move to a regular diet as you feel able. Take pain medications with a small amount of solid food, such as toast or crackers, to avoid nausea.   7. A slight fever is normal. Call the doctor if your fever is over 100 F (37.7 C) (taken under the tongue) or lasts longer than 24 hours.  8. You may have a dry mouth, muscle aches, trouble sleeping or a sore throat.  These symptoms should go away after 24 hours.  9. Do not make important or legal decisions.   Pain Management:      1. Take pain medication (if prescribed) for pain as directed by your physician.        2. WARNING: If the pain medication you have been prescribed contains Tylenol  (acetaminophen), DO NOT take additional doses of Tylenol (acetaminophen).     Call your doctor for any of the followin.  Signs of infection (fever, growing tenderness at the surgery site, severe pain, a large amount of drainage or bleeding, foul-smelling drainage, redness, swelling).    2.  It has been over 8 to 10 hours since surgery and you are still not able to urinate (pee).    3.  Headache for over 24 hours.    4.  Numbness, tingling or weakness the day after surgery (if you had spinal anesthesia).  To contact a doctor, call _____________________________________ or:      [phone] and ask for the Resident On Call for:          __________________________________________ (answered 24 hours a day)      Emergency  "Department:  [place] [facility]: [phone]  [place] [facility]: [phone]          Additional Information     If you use hormonal birth control (such as the pill, patch, ring or implants): You'll need a second form of birth control for 7 days (condoms, a diaphragm or contraceptive foam). While in the hospital, you received a medicine called Bridion. Your normal birth control will not work as well for a week after taking this medicine.          Pending Results     Date and Time Order Name Status Description    2018 0834 A pap thin layer screen In process     2018 0834 HPV High Risk Types DNA Cervical In process             Admission Information     Date & Time Provider Department Dept. Phone    2018 Ara Urbano, [facility]  PACU [phone]      Your Vitals Were     Blood Pressure Temperature Respirations Height Weight Pulse Oximetry    145/102 98.6  F (37  C) 12 1.524 m (5') 40.3 kg (88 lb 13.5 oz) 100%    BMI (Body Mass Index)                   17.35 kg/m2           [employer] Information     [employer] lets you send messages to your doctor, view your test results, renew your prescriptions, schedule appointments and more. To sign up, go to [email or web address] . Click on \"Log in\" on the left side of the screen, which will take you to the Welcome page. Then click on \"Sign up Now\" on the right side of the page.     You will be asked to enter the access code listed below, as well as some personal information. Please follow the directions to create your username and password.     Your access code is: [email or web address]  Expires: 3/15/2018  6:31 AM     Your access code will  in 90 days. If you need help or a new code, please call your [place] clinic or [phone].        Care EveryWhere ID     This is your Care EveryWhere ID. This could be used by other organizations to access your [place] medical records  [id number]        Equal Access to Services     DEJUAN BERG AH: " Hadii mona tubbso Soomaali, waaxda luqadaha, qaybta kaalmada judy, erinn andrewin hayaan bardleyesther pritchard lajosuerodrigue ethan. So [facility] [phone].    ATENCIÓN: Si arthur handy, tiene a aranda disposición servicios gratuitos de asistencia lingüística. Llame al [phone].    We comply with applicable federal civil rights laws and [place] laws. We do not discriminate on the basis of race, color, national origin, age, disability, sex, sexual orientation, or gender identity.               Review of your medicines      START taking        Dose / Directions    chlorhexidine 0.12 % solution   Commonly known as:  PERIDEX   Used for:  Periodontal disease        Dose:  15 mL   Swish and spit 15 mLs in mouth 2 times daily for 14 days Can brush or swab on to gums twice a day if patient does not tolerate rinsing.   Quantity:  420 mL   Refills:  0         CONTINUE these medicines which have NOT CHANGED        Dose / Directions    cephALEXin 500 MG capsule   Commonly known as:  KEFLEX        Dose:  500 mg   Take 500 mg by mouth 2 times daily   Refills:  0       docusate sodium 100 MG capsule   Commonly known as:  COLACE   Used for:  Constipation        Dose:  100 mg   Take 1 capsule (100 mg) by mouth 2 times daily   Quantity:  180 capsule   Refills:  1       GLYCOPYRROLATE PO        Dose:  2 mg   Take 2 mg by mouth 3 times daily   Refills:  0       MULTIVITAMIN PO        Dose:  1 tablet   Take 1 tablet by mouth every morning   Refills:  0       NADOLOL PO        Dose:  20 mg   Take 20 mg by mouth every morning   Refills:  0       polyethylene glycol powder   Commonly known as:  MIRALAX/GLYCOLAX        Dose:  1 capful   Take 1 capful by mouth daily   Refills:  0       sodium chloride 0.9% (bottle) 0.9 % irrigation   Used for:  Neurogenic bladder        Irrigate bladder with normal saline twice daily as directed.   Quantity:  5000 mL   Refills:  0            Where to get your medicines      Some of these will need a paper prescription and  others can be bought over the counter. Ask your nurse if you have questions.     Bring a paper prescription for each of these medications     chlorhexidine 0.12 % solution                Protect others around you: Learn how to safely use, store and throw away your medicines at www.disposemymeds.org.             Medication List: This is a list of all your medications and when to take them. Check marks below indicate your daily home schedule. Keep this list as a reference.      Medications           Morning Afternoon Evening Bedtime As Needed    cephALEXin 500 MG capsule   Commonly known as:  KEFLEX   Take 500 mg by mouth 2 times daily                                chlorhexidine 0.12 % solution   Commonly known as:  PERIDEX   Swish and spit 15 mLs in mouth 2 times daily for 14 days Can brush or swab on to gums twice a day if patient does not tolerate rinsing.   Last time this was given:  15 mLs on 1/4/2018  8:47 AM                                docusate sodium 100 MG capsule   Commonly known as:  COLACE   Take 1 capsule (100 mg) by mouth 2 times daily                                GLYCOPYRROLATE PO   Take 2 mg by mouth 3 times daily                                MULTIVITAMIN PO   Take 1 tablet by mouth every morning                                NADOLOL PO   Take 20 mg by mouth every morning                                polyethylene glycol powder   Commonly known as:  MIRALAX/GLYCOLAX   Take 1 capful by mouth daily                                sodium chloride 0.9% (bottle) 0.9 % irrigation   Irrigate bladder with normal saline twice daily as directed.

## 2018-01-05 ENCOUNTER — TELEPHONE (OUTPATIENT)
Dept: OBGYN | Facility: CLINIC | Age: 33
End: 2018-01-05

## 2018-01-05 DIAGNOSIS — N63.10 LUMP OF RIGHT BREAST: Primary | ICD-10-CM

## 2018-01-05 DIAGNOSIS — N64.59 OTHER SIGNS AND SYMPTOMS IN BREAST (CODE): ICD-10-CM

## 2018-01-05 DIAGNOSIS — Z03.89 ENCOUNTER FOR OBSERVATION FOR OTHER SUSPECTED DISEASES AND CONDITIONS RULED OUT: ICD-10-CM

## 2018-01-05 LAB
COPATH REPORT: NORMAL
PAP: NORMAL

## 2018-01-05 NOTE — TELEPHONE ENCOUNTER
----- Message from Drake Gallegos sent at 1/5/2018 10:22 AM CST -----  Regarding: Call pt's mother- Breast Center Referral   Contact: [phone]  Aileen, pt's mother would like a call back for clarification about whether the pt should see the breast specialist at Breast Center or just get a diagnostic mammo and US to start with for the lump in breast. If the pt is advise to just get imaging, orders for the diagnostic and US needs to be place before scheduling. Please have them contact Cain MONTERO at [phone] for scheduling this type of mammo. Aileen can be reached [phone]. Thank you.    GY    Please DO NOT send this message and/or reply back to sender.  Call Center Representatives DO NOT respond to messages.

## 2018-01-05 NOTE — OP NOTE
It was deemed necessary for this patient to be seen in the hospital operating room because patient had quadriplegic infantile cerebral palsy and thus the inability to be treated in a traditional dental clinic setting.    Summary:  Under general anesthesia, the following operations were performed in the mouth: Bilateral dental examination, dental radiographs, prophylaxis and fluoride varnish application.    Names:  The attending physician was Dr. Ara Urbano DDS. The first assistant dental resident was Meme Asencio DDS. The anesthesiologist was Favio Bradford MD.  The scrub nurse was Eliza. The circulating nurse was Courtney. The CRNA was Ana María.    Diagnosis:  The pre-operative diagnosis was suspected periodontal disease and dental caries.  The post-operative diagnosis was periodontal disease.     General Anesthesia Start:  The patient was brought into the operating room and draped in the usual customary [facility] fashion. Following the time-out procedures, general anesthesia was administered through the patient s left naris.    Examination:  A bilateral dental exam was performed and full mouth series of 14 periapical and 4 bitewing radiographs were obtained and interpreted. A moist throat pack was placed at 09:30 am.    Observations:  Clinical examination revealed generalized moderate plaque, generalized bleeding on probing and periodontal pockets ranging from 3-6mm.  Radiographic examination revealed normal bone trabeculation.     Procedure:  Combined case with GYN. GYN performed a PAP smear first and then it was followed by dental procedures.    The following procedures were performed:    Prophylaxis was performed using ultrasonic scalers followed by rubber cup polishing and flossing.    Fluoride varnish was applied to all teeth.    The throat pack was removed with suction at 10:02 am. The oropharynx was inspected and found to be clear. Estimated blood loss was 5ccs. The attending doctor,   Ara Urbano was present for the entire procedure.    The patient was extubated in the operating room and taken to the post-anesthesia care unit in good condition.     Ara Urbano DDS    As dicated by Meme Asencio DDS

## 2018-01-05 NOTE — TELEPHONE ENCOUNTER
Spoke with pt mom, Aileen, and informed her that just imaging is needed and orders will be placed for bilateral mammogram and ultrasound on the right side. She indicated understanding and agreed with plan.

## 2018-01-09 LAB
FINAL DIAGNOSIS: NORMAL
HPV HR 12 DNA CVX QL NAA+PROBE: NEGATIVE
HPV16 DNA SPEC QL NAA+PROBE: NEGATIVE
HPV18 DNA SPEC QL NAA+PROBE: NEGATIVE
SPECIMEN DESCRIPTION: NORMAL

## 2018-01-10 ENCOUNTER — RADIANT APPOINTMENT (OUTPATIENT)
Dept: MAMMOGRAPHY | Facility: CLINIC | Age: 33
End: 2018-01-10
Attending: OBSTETRICS & GYNECOLOGY
Payer: MEDICARE

## 2018-01-10 DIAGNOSIS — N64.59 OTHER SIGNS AND SYMPTOMS IN BREAST (CODE): ICD-10-CM

## 2018-01-10 DIAGNOSIS — N63.0 BREAST LUMP IN UPPER INNER QUADRANT: ICD-10-CM

## 2018-01-10 DIAGNOSIS — N63.10 LUMP OF RIGHT BREAST: ICD-10-CM

## 2018-01-29 ENCOUNTER — TELEPHONE (OUTPATIENT)
Dept: OBGYN | Facility: CLINIC | Age: 33
End: 2018-01-29

## 2018-01-29 DIAGNOSIS — N63.0 BREAST LUMP IN UPPER INNER QUADRANT: Primary | ICD-10-CM

## 2018-01-29 DIAGNOSIS — N63.12 MASS OF UPPER INNER QUADRANT OF RIGHT BREAST: ICD-10-CM

## 2018-01-29 DIAGNOSIS — N63.10 LUMP OF RIGHT BREAST: ICD-10-CM

## 2018-01-29 NOTE — TELEPHONE ENCOUNTER
----- Message from Stephania Ochoa, LAWRENCE sent at 1/29/2018  3:17 PM CST -----  Regarding: FW: Orders for an Ultrasound  Contact: [phone]   Mom's mailbox is full. Try again  -Stephania  ----- Message -----     From: Ngozi Persaud RN     Sent: 1/26/2018  10:25 AM       To: [facility]  Subject: FW: Orders for an Ultrasound                     Called and left message on voicemail for phone number delfino in message and on patient chart. Name on voicemail did not sound like mother's name, Aileen, so left general message to call triage.    ----- Message -----     From: Ольга Hoff     Sent: 1/25/2018   4:15 PM       To: [facility]  Subject: Orders for an Ultrasound                         Good afternoon,    Patient's mom called about getting an order from Dr. Iyer for an ultrasound at the breast center. Please f/u with the patient's mom at [phone].    Thank you  Ольга Hoff  Call Center   Please DO NOT send this message and/or reply back to sender. Call Center Representatives DO NOT respond to messages

## 2018-01-31 NOTE — TELEPHONE ENCOUNTER
Orders placed for breast US. Pt will need to follow up in 6 months for repeat breast US per recommendation after breast US on 1/10/18. Left VM on mothers home phone.

## 2018-07-10 ENCOUNTER — RADIANT APPOINTMENT (OUTPATIENT)
Dept: MAMMOGRAPHY | Facility: CLINIC | Age: 33
End: 2018-07-10
Attending: OBSTETRICS & GYNECOLOGY
Payer: MEDICARE

## 2018-07-10 DIAGNOSIS — N63.10 LUMP OF RIGHT BREAST: ICD-10-CM

## 2018-07-10 DIAGNOSIS — N63.12 MASS OF UPPER INNER QUADRANT OF RIGHT BREAST: ICD-10-CM

## 2018-07-10 DIAGNOSIS — N63.0 BREAST LUMP IN UPPER INNER QUADRANT: ICD-10-CM

## 2019-01-10 ENCOUNTER — ANCILLARY PROCEDURE (OUTPATIENT)
Dept: MAMMOGRAPHY | Facility: CLINIC | Age: 34
End: 2019-01-10
Payer: MEDICARE

## 2019-01-10 DIAGNOSIS — N63.10 LUMP OF RIGHT BREAST: ICD-10-CM

## 2019-01-10 DIAGNOSIS — N63.12 MASS OF UPPER INNER QUADRANT OF RIGHT BREAST: ICD-10-CM

## 2019-01-10 DIAGNOSIS — Z09 FOLLOW UP: ICD-10-CM

## 2019-01-10 DIAGNOSIS — N63.0 BREAST LUMP IN UPPER INNER QUADRANT: ICD-10-CM

## 2019-04-21 ENCOUNTER — TRANSFERRED RECORDS (OUTPATIENT)
Dept: HEALTH INFORMATION MANAGEMENT | Facility: CLINIC | Age: 34
End: 2019-04-21

## 2019-04-22 ENCOUNTER — HOSPITAL ENCOUNTER (EMERGENCY)
Facility: CLINIC | Age: 34
Discharge: HOME | End: 2019-04-22
Attending: EMERGENCY MEDICINE | Admitting: EMERGENCY MEDICINE
Payer: MEDICARE

## 2019-04-22 ENCOUNTER — OFFICE VISIT (OUTPATIENT)
Dept: UROLOGY | Facility: CLINIC | Age: 34
End: 2019-04-22
Payer: MEDICARE

## 2019-04-22 VITALS
DIASTOLIC BLOOD PRESSURE: 74 MMHG | BODY MASS INDEX: 17.47 KG/M2 | SYSTOLIC BLOOD PRESSURE: 98 MMHG | HEIGHT: 60 IN | RESPIRATION RATE: 16 BRPM | TEMPERATURE: 99.1 F | HEART RATE: 105 BPM | WEIGHT: 89 LBS | OXYGEN SATURATION: 96 %

## 2019-04-22 VITALS — DIASTOLIC BLOOD PRESSURE: 52 MMHG | SYSTOLIC BLOOD PRESSURE: 98 MMHG | HEART RATE: 43 BPM

## 2019-04-22 DIAGNOSIS — N31.9 NEUROGENIC BLADDER: Primary | ICD-10-CM

## 2019-04-22 DIAGNOSIS — Z93.52 MITROFANOFF APPENDICOVESICOSTOMY PRESENT (H): ICD-10-CM

## 2019-04-22 PROCEDURE — 99284 EMERGENCY DEPT VISIT MOD MDM: CPT | Mod: Z6 | Performed by: EMERGENCY MEDICINE

## 2019-04-22 PROCEDURE — 99283 EMERGENCY DEPT VISIT LOW MDM: CPT | Performed by: EMERGENCY MEDICINE

## 2019-04-22 RX ORDER — CEFDINIR 300 MG/1
300 CAPSULE ORAL 2 TIMES DAILY
Qty: 14 CAPSULE | Refills: 0 | Status: SHIPPED | OUTPATIENT
Start: 2019-04-22 | End: 2019-04-29

## 2019-04-22 ASSESSMENT — PAIN SCALES - GENERAL
PAINLEVEL: NO PAIN (0)
PAINLEVEL: NO PAIN (0)

## 2019-04-22 ASSESSMENT — MIFFLIN-ST. JEOR: SCORE: 1035.2

## 2019-04-22 ASSESSMENT — ENCOUNTER SYMPTOMS: DIFFICULTY URINATING: 1

## 2019-04-22 NOTE — ED NOTES
Sign out from Dr. Baker at 7:15AM    Situation:  33-year-old female with a history of cerebral palsy and quadriplegia.  Has a Mitrofanoff for catheterization of urine.  Presented to an outside ED with inability to cath per the Mitrofanoff.  They were able to place a urethral Edouard catheter.  Blood cell count elevated to 19.3.  Treated with 1 dose of IV cefepime prior to transfer for urology evaluation.    Plan: Awaiting urology consultation.    Events:   7:55 AM  The patient has been seen by urology.  They are satisfied that the urethral catheter is in appropriate position.  Gentle irrigation was performed which has remove debris from the bladder.  Given her increased white blood cell count it was recommended to continue outpatient oral antibiotics.  We reviewed her chart together and noted a history of long QT syndrome which make use of quinolones unsafe.  I decided to start a course of oral Omnicef twice daily.  Neurology will arrange for outpatient follow-up with Dr. Bustos for evaluation of the Mitrofanoff channel in the meantime they are to continue the Edouard catheter in the urethra into irrigated daily.  I checked in with the patient's mother and they do have irrigation supplies at home.    Discharged.    MD MALCOLM Quinteros, Brigitte Hilton MD  04/22/19 9784

## 2019-04-22 NOTE — DISCHARGE INSTRUCTIONS
Thank you for coming to the [facility] Emergency Department.     Keep the baig in place through the urethra.   Flush the catheter daily with sterile water or saline.     Expect a phone call from our Urology Clinic, Dr. Mullins, for follow up evaluation of the Mitrofanoff.     Start the antibiotic Cefdinir twice daily for the next 7 days. Return to the ER for signs of systemic infection, like fevers >100.4F, sweating, vomiting.

## 2019-04-22 NOTE — PATIENT INSTRUCTIONS
"Follow up as scheduled in [place].        It was a pleasure meeting with you today.  Thank you for allowing me and my team the privilege of caring for you today.  YOU are the reason we are here, and I truly hope we provided you with the excellent service you deserve.  Please let us know if there is anything else we can do for you so that we can be sure you are leaving completely satisfied with your care experience.          AFTER YOUR CYSTOSCOPY        You have just completed a cystoscopy, or \"cysto\", which allowed your physician to learn more about your bladder (or to remove a stent placed after surgery). We suggest that you continue to avoid caffeine, fruit juice, and alcohol for the next 24 hours, however, you are encouraged to return to your normal activities.         A few things that are considered normal after your cystoscopy:     * Small amount of bleeding (or spotting) that clears within the next 24 hours     * Slight burning sensation with urination     * Sensation to of needing to avoid more frequently     * The feeling of \"air\" in your urine     * Mild discomfort that is relieved with Tylenol        Please contact our office promptly if you:     * Develop a fever above 101 degrees     * Are unable to urinate     * Develop bright red blood that does not stop     * Severe pain or swelling         Please contact our office with any concerns or questions @DEPTPHN.  "

## 2019-04-22 NOTE — ED AVS SNAPSHOT
[facility], [place], Emergency Department  [address] [place] [id number]  Phone:  [phone]                                    Robi Remy   MRN: [id number]    Department:  [facility], Emergency Department   Date of Visit:  4/22/2019           After Visit Summary Signature Page    I have received my discharge instructions, and my questions have been answered. I have discussed any challenges I see with this plan with the nurse or doctor.    ..........................................................................................................................................  Patient/Patient Representative Signature      ..........................................................................................................................................  Patient Representative Print Name and Relationship to Patient    ..................................................               ................................................  Date                                   Time    ..........................................................................................................................................  Reviewed by Signature/Title    ...................................................              ..............................................  Date                                               Time          22EPIC Rev 08/18

## 2019-04-22 NOTE — NURSING NOTE
Chief Complaint   Patient presents with     Cystoscopy     difficulty catheterizing mitrofanoff       Blood pressure 98/52, pulse (!) 43. There is no height or weight on file to calculate BMI.    Patient Active Problem List   Diagnosis     Neurogenic bladder       Allergies   Allergen Reactions     Tape [Adhesive Tape]      Skin blistering  Tape had been on for one month without changing. Unsure what tape       Current Outpatient Medications   Medication Sig Dispense Refill     cefdinir (OMNICEF) 300 MG capsule Take 1 capsule (300 mg) by mouth 2 times daily for 7 days 14 capsule 0     docusate sodium (COLACE) 100 MG capsule Take 1 capsule (100 mg) by mouth 2 times daily 180 capsule 1     GLYCOPYRROLATE PO Take 2 mg by mouth 3 times daily       Multiple Vitamins-Minerals (MULTIVITAMIN OR) Take 1 tablet by mouth every morning        NADOLOL PO Take 20 mg by mouth every morning        polyethylene glycol (MIRALAX/GLYCOLAX) powder Take 1 capful by mouth daily       sodium chloride 0.9%, bottle, 0.9 % irrigation Irrigate bladder with normal saline twice daily as directed. 5000 mL 0       Social History     Tobacco Use     Smoking status: Never Smoker     Smokeless tobacco: Never Used   Substance Use Topics     Alcohol use: No     Drug use: No       Invasive Procedure Safety Checklist:    Procedure: cystoscopy    Action: Complete sections and checkboxes as appropriate.    Pre-procedure:  1. Patient ID Verified with 2 identifiers (Debra and  or MRN) : YES    2. Procedure and site verified with patient/designee (when able) : YES    3. Accurate consent documentation in medical record : YES    4. H&P (or appropriate assessment) documented in medical record : N/A  H&P must be up to 30 days prior to procedure an updated within 24 hours of                 Procedure as applicable.     5. Relevant diagnostic and radiology test results appropriately labeled and displayed as applicable : YES    6. Blood products, implants,  devices, and/or special equipment available for the procedure as applicable : YES    7. Procedure site(s) marked with provider initials [Exclusions: none] : NO    8. Marking not required. Reason : Yes  Procedure does not require site marking    Time Out:     Time-Out performed immediately prior to starting procedure, including verbal and active participation of all team members addressing: YES    1. Correct patient identity.  2. Confirmed that the correct side and site are marked.  3. An accurate procedure to be done.  4. Agreement on the procedure to be done.  5. Correct patient position.  6. Relevant images and results are properly labeled and appropriately displayed.  7. The need to administer antibiotics or fluids for irrigation purposes during the procedure as applicable.  8. Safety precautions based on patient history or medication use.    During Procedure: Verification of correct person, site, and procedure occurs any time the responsibility for care of the patient is transferred to another member of the care team.    No medications given during this visit.    Aminah Levin LPN  4/22/2019  12:26 PM      Removal of baig:  22 Fr straight tipped latex baig catheter removed from urethral meatus without difficulty.      One Cipro 500 mg given per protocol: No, patient currently starting to take omnicef.       Aminah Levin LPN  4/22/2019  1:01 PM

## 2019-04-22 NOTE — ED PROVIDER NOTES
History     Chief Complaint   Patient presents with     urology problem     HPI  Robi Remy is a 33 year old female with a history of cerebral palsy, nonverbal, neurogenic bladder status post bladder augmentation with Mitrofanoff, amongst others, who presents from [place] [facility] for urology consult.  She presented there with urinary retention.  She does have a Mitrofanoff and caregivers reported they were unable to catheter Mitrofanoff to drain her bladder.  Staff at the [facility] ED attempted to catheter her urethra, initially reported they were unsuccessful in getting urine out, felt there was clot or debris present in the bladder.  They spoke with urology here who recommended transfer here for evaluation in the ED by the urology resident.  In the interim they apparently were able to pass a different Edouard catheter through her urethra which did drain some urine.  She did have a high white blood cell count there, and was given a dose of cefepime empirically.  The patient is nonverbal and is not able to provide any further history at this time.  There is no other family members or staff here with her at this time.    Past Medical History:   Diagnosis Date     Cerebral palsy (H)      Developmental delay      Long Q-T syndrome     takes nadolol     Neurogenic bladder      Nonverbal      Self-catheterizes urinary bladder     Parents Cath patient     Spastic quadriplegia (H)        Past Surgical History:   Procedure Laterality Date     BACK SURGERY  2002    spine fusion     EXAM UNDER ANESTHESIA DENTAL  2010     EXAM UNDER ANESTHESIA PELVIC N/A 1/4/2018    Procedure: EXAM UNDER ANESTHESIA PELVIC;  Pap Smear, Breast exam, Mirena IUD removal and replacement.  Dental Exam, Radiographs, Dental Restorations, Periodontal Therapy, Dental Extractions, Biopsies   ;  Surgeon: Briseida Iyer MD;  Location: [facility] OR     EXAM UNDER ANESTHESIA, RESTORATIONS, EXTRACTION(S) DENTAL, COMBINED N/A 1/4/2018    Procedure: COMBINED EXAM  UNDER ANESTHESIA, RESTORATIONS, EXTRACTION(S) DENTAL;  Dental exam, x- rays, periodontal therapy and flouride varnish;  Surgeon: Ara Urbano DDS;  Location: [facility] OR     LAPAROSCOPIC MITROFANOFF PROCEDURE (APPENDIX CONDUIT) N/A 9/4/2014    Procedure: LAPAROSCOPIC MITROFANOFF PROCEDURE (APPENDIX CONDUIT);  Surgeon: Naren Bustos MD;  [facility] OR     LAPAROSCOPIC REPAIR BLADDER N/A 9/4/2014    Procedure: LAPAROSCOPIC REPAIR BLADDER;  Surgeon: Naren Bustos MD;  [facility]     REPLACE INTRAUTERINE DEVICE N/A 1/4/2018    Procedure: REPLACE INTRAUTERINE DEVICE;  Mirena IUD removal and replacement;  Surgeon: Briseida Iyer MD;  Location: [facility] OR       Family History   Problem Relation Age of Onset     Heart Disease Father         long QT syndrome        Social History     Tobacco Use     Smoking status: Never Smoker     Smokeless tobacco: Never Used   Substance Use Topics     Alcohol use: No       I have reviewed the Medications, Allergies, Past Medical and Surgical History, and Social History in the Epic system.    Review of Systems   Unable to perform ROS: Patient nonverbal   Genitourinary: Positive for difficulty urinating.   All other systems reviewed and are negative.      Physical Exam   BP: 92/77  Pulse: 102  Temp: 99.1  F (37.3  C)  Resp: 16  Height: 152.4 cm (5')  Weight: 40.4 kg (89 lb)  SpO2: 96 %      Physical Exam   Constitutional: No distress.   HENT:   Head: Atraumatic.   Mouth/Throat: No oropharyngeal exudate.   Eyes: No scleral icterus.   Cardiovascular: Normal heart sounds and intact distal pulses.   Pulmonary/Chest: Breath sounds normal. No respiratory distress.   Abdominal: Soft. There is no tenderness.   Musculoskeletal: She exhibits no tenderness.   Neurological:   Non-verbal   Skin: Skin is warm. She is not diaphoretic.       ED Course        Procedures             Critical Care time:  none             Labs Ordered and Resulted from Time of ED Arrival Up to  the Time of Departure from the ED - No data to display         Assessments & Plan (with Medical Decision Making)   The pt is non-verbal and is unable to provide history. No family here at this time. We will await Urology recs. Pt will be signed out to the oncoming provider at shift change pending Urology recs.     Dictation Disclaimer: Some of this Note has been completed with voice-recognition dictation software. Although errors are generally corrected real-time, there is the potential for a rare error to be present in the completed chart.      I have reviewed the nursing notes.    I have reviewed the findings, diagnosis, plan and need for follow up with the patient.       Medication List      There are no discharge medications for this visit.         Final diagnoses:   Mitrofanoff appendicovesicostomy present (H)       4/22/2019   [facility], [place], EMERGENCY DEPARTMENT     Yoselyn Baker MD  04/22/19 0731

## 2019-04-22 NOTE — LETTER
2019       RE: Robi Remy  [address] [facility] [address]     Dear Colleague,    Thank you for referring your patient, Robi Remy, to the [facility] UROLOGY AND INST FOR PROSTATE AND UROLOGIC CANCERS at [facility]. Please see a copy of my visit note below.      Urology Clinic    Naren Bustos MD  Date of Service: 2019     Name: Robi Remy  MRN: [id number]  Age: 33 year old  : 1985  Referring provider: No ref. provider found     Assessment and Plan:  Assessment:  Robi Remy  is a 33 year old female with mucous retention overnight. No stomal stenosis or problems with catheterizing.     Plan:  Instructed on better mucous irrigation. Has annual appt with [place] urolog tomorrow.   ______________________________________________________________________    HPI  Robi Remy is a 33 year old female with a history of neurogenic bladder s/p bladder augmentation and creation of a catheterizeable channel in 2014. She has been seeing Jeannette at [place] for f/u. She went into retention last night. No trouble passing catheter (14F) per stoma but no return. Then 20F placed per urethra with return of large amounts of mucous -- irrigated clear and now draining well.     Review of Systems:   Pertinent items are noted in HPI or as below, remainder of complete ROS is negative.      Description of Procedure:  After informed consent was obtained, the patient was brought to the procedure room and placed in the low lithotomy position.  The abdomen was prepped and draped in a sterile fashion.      The channel was easily catheterized with a 14F catheter with return of clear urine. A flexible pedi cystoscope was introduced through a well lubricated channel and into the urinary bladder. The channel showed no evidence of stricture -- there was a widening of the channel just before the ICV but no e/o trauma from catheter. The ICV was  continent / tight. The bladder was free of tumors or stones. The degree of opening between augment and native bladder was wide. There was no mucous in the bladder. No tumors or stones.     The cystoscope was removed and the findings were explained to the patient.    Scribe Disclosure:  I, Chelsea Hancock, am serving as a scribe to document services personally performed by Naren Bustos MD at this visit, based upon the provider's statements to me. All documentation has been reviewed by the aforementioned provider prior to being entered into the official medical record.       Again, thank you for allowing me to participate in the care of your patient.      Sincerely,    Naren Bustos MD

## 2019-04-22 NOTE — PROGRESS NOTES
Urology Clinic    Naren Bustos MD  Date of Service: 2019     Name: Robi Remy  MRN: [id number]  Age: 33 year old  : 1985  Referring provider: No ref. provider found     Assessment and Plan:  Assessment:  Robi Remy  is a 33 year old female with mucous retention overnight. No stomal stenosis or problems with catheterizing.     Plan:  Instructed on better mucous irrigation. Has annual appt with [facility] urology tomorrow.   ______________________________________________________________________    HPI  Robi Remy is a 33 year old female with a history of neurogenic bladder s/p bladder augmentation and creation of a catheterizeable channel in 2014. She has been seeing Jeannette at [facility] for f/u. She went into retention last night. No trouble passing catheter (14F) per stoma but no return. Then 20F placed per urethra with return of large amounts of mucous -- irrigated clear and now draining well.     Review of Systems:   Pertinent items are noted in HPI or as below, remainder of complete ROS is negative.      Description of Procedure:  After informed consent was obtained, the patient was brought to the procedure room and placed in the low lithotomy position.  The abdomen was prepped and draped in a sterile fashion.      The channel was easily catheterized with a 14F catheter with return of clear urine. A flexible pedi cystoscope was introduced through a well lubricated channel and into the urinary bladder. The channel showed no evidence of stricture -- there was a widening of the channel just before the ICV but no e/o trauma from catheter. The ICV was continent / tight. The bladder was free of tumors or stones. The degree of opening between augment and native bladder was wide. There was no mucous in the bladder. No tumors or stones.     The cystoscope was removed and the findings were explained to the patient.    Scribe Disclosure:  Chelsea LOWRY, am serving as a scribe to  document services personally performed by Naren Bustos MD at this visit, based upon the provider's statements to me. All documentation has been reviewed by the aforementioned provider prior to being entered into the official medical record.

## 2019-04-22 NOTE — ED TRIAGE NOTES
BIBA Pt. has clogged mitrof, and is needing a urology consult. Currently has temporary baig in place draining clear, yellow urine. Pt. Is non-verbal.

## 2019-04-23 NOTE — PROGRESS NOTES
Writer in patients chart for post discharge call. Unable to reach the patient at this time.     Lida Tovar RN on 4/23/2019 at 12:06 PM

## 2019-04-24 ENCOUNTER — TELEPHONE (OUTPATIENT)
Dept: UROLOGY | Facility: CLINIC | Age: 34
End: 2019-04-24

## 2019-04-24 NOTE — TELEPHONE ENCOUNTER
[facility] Call Center    Phone Message    May a detailed message be left on voicemail: yes    Reason for Call: Other: Pt mother Aileen calling asking for a call back they are still having catheter issues and she needs help/advise. Please return call as soon as possible. Thank You.      Action Taken: Message routed to:  [facility] ([facility]  urology

## 2019-04-25 NOTE — TELEPHONE ENCOUNTER
Spoke to Neisha, they are only able to catheterize mitrofanoff a few inches.  Have been catheterizing via urethra with no issues.  Patient will come to clinic tomorrow to see Dr. Laird to further assess.    Simran Cabrera RN, BSN  Care Coordinator- Reconstructive Urology

## 2019-04-25 NOTE — TELEPHONE ENCOUNTER
[facility] Call Center    Phone Message    May a detailed message be left on voicemail: yes    Reason for Call: Other: Pts home care nurse Neisha calling to discuss Mitrofanoff issues with this pt. She has not been able to access it and believes there to be significant enough blockage where pt may need to go to the hospital. Please follow up with Neisha as soon as possible to discuss.     Action Taken: Message routed to:  [facility] PR[facility]

## 2019-04-26 ENCOUNTER — OFFICE VISIT (OUTPATIENT)
Dept: UROLOGY | Facility: CLINIC | Age: 34
End: 2019-04-26
Payer: MEDICARE

## 2019-04-26 DIAGNOSIS — Z93.52 MITROFANOFF APPENDICOVESICOSTOMY PRESENT (H): ICD-10-CM

## 2019-04-26 DIAGNOSIS — N31.9 NEUROGENIC BLADDER: Primary | ICD-10-CM

## 2019-04-26 RX ORDER — AMOXICILLIN 875 MG
875 TABLET ORAL DAILY
COMMUNITY
Start: 2019-04-26 | End: 2019-05-10

## 2019-04-26 NOTE — NURSING NOTE
Chief Complaint   Patient presents with     RECHECK     Difficulty cathing rakesh Cabrera RN, BSN  Care Coordinator- Reconstructive Urology

## 2019-04-26 NOTE — LETTER
RE: Robi Remy  [address] [address]     Dear Colleague,    Thank you for referring your patient, Robi Remy, to the [facility] UROLOGY AND [facility] FOR PROSTATE AND UROLOGIC CANCERS at [facility]. Please see a copy of my visit note below.    Cystoscopy    PRE-PROCEDURE DIAGNOSIS: Difficulty catheterizing Mitrofanoff  POST-PROCEDURE DIAGNOSIS: Difficulty catheterizing Mitrofanoff  PROCEDURE: Cystoscopy through Catheterizable Channel    HISTORY: Robi Remy is a 33 year old female with CP. She previously underwent an ileocecocystoplasty with Dr. Bustos. Recently, they have encountered troubles with CIC per Mitrofanoff. This led to two recent clinic appointments, which included one cystoscopy by Dr. Bustos about 4 days ago. They again cannot catheterize the channel. Thus she was brought to clinic today.    I attempted CIC using 14 Straight and Coude tip catheters which were unsuccessful.  Thus I recommended cystoscopy    DESCRIPTION OF PROCEDURE:  After informed consent was obtained, the patient was brought to the procedure room wheres he was placed in the supine position with all pressure points well padded.  He was prepped and draped in a sterile fashion. A flexible cystoscope was introduced through the channel. The channel was patent without stenosis. Near the level of the bladder near the IC valve, there was a diverticulum/false passage, and it seemed apparent that the catheter may preferentially choose to go this path.    After mild re-orientation, the IC valve was visualized. The scope passed into the bladder.     No strictures noted.    A wire was placed, then a 14 Fr straight cath was placed over the wire. The bladder was drained. The catheter was capped and taped in place.    ASSESSMENT AND PLAN:  Mild outpouching/laxity of the channel at the level of the IC valve.  We will leave the catheter one week.  Then they will resume  CIC.  If they encounter trouble again, surgery would be in the form of an open abdominal revision with additional plication near the IC valve.  If they encounter trouble CIC again then will return to clinic to discuss moving forward with that.  Alteratives to keep them out of the ER is urethral cathing, which they can do.    Again, thank you for allowing me to participate in the care of your patient.      Sincerely,    Derek Laird MD

## 2019-04-28 NOTE — PROGRESS NOTES
Cystoscopy    PRE-PROCEDURE DIAGNOSIS: Difficulty catheterizing Mitrofanoff  POST-PROCEDURE DIAGNOSIS: Difficulty catheterizing Mitrofanoff  PROCEDURE: Cystoscopy through Catheterizable Channel    HISTORY: Robi Remy is a 33 year old female with CP. She previously underwent an ileocecocystoplasty with Dr. Bustos. Recently, they have encountered troubles with CIC per Mitrofanoff. This led to two recent clinic appointments, which included one cystoscopy by Dr. Bustos about 4 days ago. They again cannot catheterize the channel. Thus she was brought to clinic today.    I attempted CIC using 14 Straight and Coude tip catheters which were unsuccessful.  Thus I recommended cystoscopy        DESCRIPTION OF PROCEDURE:  After informed consent was obtained, the patient was brought to the procedure room wheres he was placed in the supine position with all pressure points well padded.  He was prepped and draped in a sterile fashion. A flexible cystoscope was introduced through the channel. The channel was patent without stenosis. Near the level of the bladder near the IC valve, there was a diverticulum/false passage, and it seemed apparent that the catheter may preferentially choose to go this path.    After mild re-orientation, the IC valve was visualized. The scope passed into the bladder.     No strictures noted.    A wire was placed, then a 14 Fr straight cath was placed over the wire. The bladder was drained. The catheter was capped and taped in place.    ASSESSMENT AND PLAN:  Mild outpouching/laxity of the channel at the level of the IC valve.  We will leave the catheter one week.  Then they will resume CIC.  If they encounter trouble again, surgery would be in the form of an open abdominal revision with additional plication near the IC valve.  If they encounter trouble CIC again then will return to clinic to discuss moving forward with that.  Alteratives to keep them out of the ER is urethral cathing, which they  can do.      Derek Laird MD

## 2022-02-14 ENCOUNTER — TELEPHONE (OUTPATIENT)
Dept: UROLOGY | Facility: CLINIC | Age: 37
End: 2022-02-14
Payer: MEDICARE

## 2022-02-14 NOTE — TELEPHONE ENCOUNTER
Spoke with Pts mom (patient is non-verbal) and she said that Patient is now receiving urology care at [place], and will not need follow-up appointment.

## 2023-02-02 PROCEDURE — 99284 EMERGENCY DEPT VISIT MOD MDM: CPT | Performed by: EMERGENCY MEDICINE

## 2023-02-02 PROCEDURE — 99285 EMERGENCY DEPT VISIT HI MDM: CPT | Mod: 25 | Performed by: EMERGENCY MEDICINE

## 2023-02-02 PROCEDURE — 96374 THER/PROPH/DIAG INJ IV PUSH: CPT | Performed by: EMERGENCY MEDICINE

## 2023-02-02 PROCEDURE — 96361 HYDRATE IV INFUSION ADD-ON: CPT | Performed by: EMERGENCY MEDICINE

## 2023-02-03 ENCOUNTER — HOSPITAL ENCOUNTER (INPATIENT)
Facility: CLINIC | Age: 38
LOS: 2 days | Discharge: HOME | DRG: 689 | End: 2023-02-05
Attending: EMERGENCY MEDICINE | Admitting: INTERNAL MEDICINE
Payer: MEDICARE

## 2023-02-03 ENCOUNTER — APPOINTMENT (OUTPATIENT)
Dept: CT IMAGING | Facility: CLINIC | Age: 38
DRG: 689 | End: 2023-02-03
Attending: EMERGENCY MEDICINE
Payer: MEDICARE

## 2023-02-03 DIAGNOSIS — R31.9 HEMATURIA, UNSPECIFIED TYPE: ICD-10-CM

## 2023-02-03 DIAGNOSIS — R11.0 NAUSEA: ICD-10-CM

## 2023-02-03 DIAGNOSIS — R14.0 ABDOMINAL DISTENSION: ICD-10-CM

## 2023-02-03 DIAGNOSIS — D72.829 LEUKOCYTOSIS, UNSPECIFIED TYPE: ICD-10-CM

## 2023-02-03 LAB
ALBUMIN SERPL BCG-MCNC: 4.6 G/DL (ref 3.5–5.2)
ALBUMIN UR-MCNC: 100 MG/DL
ALP SERPL-CCNC: 89 U/L (ref 35–104)
ALT SERPL W P-5'-P-CCNC: 18 U/L (ref 10–35)
ANION GAP SERPL CALCULATED.3IONS-SCNC: 13 MMOL/L (ref 7–15)
APPEARANCE UR: ABNORMAL
AST SERPL W P-5'-P-CCNC: 24 U/L (ref 10–35)
ATRIAL RATE - MUSE: 77 BPM
BACTERIA #/AREA URNS HPF: ABNORMAL /HPF
BASOPHILS # BLD AUTO: 0.1 10E3/UL (ref 0–0.2)
BASOPHILS NFR BLD AUTO: 0 %
BILIRUB SERPL-MCNC: 0.5 MG/DL
BILIRUB UR QL STRIP: NEGATIVE
BUN SERPL-MCNC: 40.5 MG/DL (ref 6–20)
CALCIUM SERPL-MCNC: 10.5 MG/DL (ref 8.6–10)
CHLORIDE SERPL-SCNC: 98 MMOL/L (ref 98–107)
COLOR UR AUTO: ABNORMAL
CREAT SERPL-MCNC: 0.54 MG/DL (ref 0.51–0.95)
DEPRECATED HCO3 PLAS-SCNC: 26 MMOL/L (ref 22–29)
DIASTOLIC BLOOD PRESSURE - MUSE: NORMAL MMHG
EOSINOPHIL # BLD AUTO: 0.1 10E3/UL (ref 0–0.7)
EOSINOPHIL NFR BLD AUTO: 1 %
ERYTHROCYTE [DISTWIDTH] IN BLOOD BY AUTOMATED COUNT: 13.7 % (ref 10–15)
GFR SERPL CREATININE-BSD FRML MDRD: >90 ML/MIN/1.73M2
GLUCOSE SERPL-MCNC: 154 MG/DL (ref 70–99)
GLUCOSE UR STRIP-MCNC: NEGATIVE MG/DL
HCG SERPL QL: NEGATIVE
HCT VFR BLD AUTO: 47.8 % (ref 35–47)
HGB BLD-MCNC: 15.5 G/DL (ref 11.7–15.7)
HGB UR QL STRIP: ABNORMAL
HYALINE CASTS: 2 /LPF
IMM GRANULOCYTES # BLD: 0 10E3/UL
IMM GRANULOCYTES NFR BLD: 0 %
INTERPRETATION ECG - MUSE: NORMAL
KETONES UR STRIP-MCNC: NEGATIVE MG/DL
LACTATE SERPL-SCNC: 0.9 MMOL/L (ref 0.7–2)
LEUKOCYTE ESTERASE UR QL STRIP: ABNORMAL
LIPASE SERPL-CCNC: 11 U/L (ref 13–60)
LYMPHOCYTES # BLD AUTO: 2 10E3/UL (ref 0.8–5.3)
LYMPHOCYTES NFR BLD AUTO: 16 %
MCH RBC QN AUTO: 27.1 PG (ref 26.5–33)
MCHC RBC AUTO-ENTMCNC: 32.4 G/DL (ref 31.5–36.5)
MCV RBC AUTO: 83 FL (ref 78–100)
MONOCYTES # BLD AUTO: 1.7 10E3/UL (ref 0–1.3)
MONOCYTES NFR BLD AUTO: 13 %
MUCOUS THREADS #/AREA URNS LPF: PRESENT /LPF
NEUTROPHILS # BLD AUTO: 8.8 10E3/UL (ref 1.6–8.3)
NEUTROPHILS NFR BLD AUTO: 70 %
NITRATE UR QL: NEGATIVE
NRBC # BLD AUTO: 0 10E3/UL
NRBC BLD AUTO-RTO: 0 /100
P AXIS - MUSE: 41 DEGREES
PH UR STRIP: 6.5 [PH] (ref 5–7)
PLATELET # BLD AUTO: 356 10E3/UL (ref 150–450)
POTASSIUM SERPL-SCNC: 4.3 MMOL/L (ref 3.4–5.3)
PR INTERVAL - MUSE: 108 MS
PROT SERPL-MCNC: 8.6 G/DL (ref 6.4–8.3)
QRS DURATION - MUSE: 70 MS
QT - MUSE: 460 MS
QTC - MUSE: 520 MS
R AXIS - MUSE: 45 DEGREES
RBC # BLD AUTO: 5.73 10E6/UL (ref 3.8–5.2)
RBC URINE: 22 /HPF
SODIUM SERPL-SCNC: 137 MMOL/L (ref 136–145)
SP GR UR STRIP: 1.01 (ref 1–1.03)
SYSTOLIC BLOOD PRESSURE - MUSE: NORMAL MMHG
T AXIS - MUSE: 35 DEGREES
UROBILINOGEN UR STRIP-MCNC: NORMAL MG/DL
VENTRICULAR RATE- MUSE: 77 BPM
WBC # BLD AUTO: 12.6 10E3/UL (ref 4–11)
WBC CLUMPS #/AREA URNS HPF: PRESENT /HPF
WBC URINE: >182 /HPF

## 2023-02-03 PROCEDURE — 258N000003 HC RX IP 258 OP 636: Performed by: EMERGENCY MEDICINE

## 2023-02-03 PROCEDURE — 99222 1ST HOSP IP/OBS MODERATE 55: CPT | Mod: AI | Performed by: INTERNAL MEDICINE

## 2023-02-03 PROCEDURE — G1010 CDSM STANSON: HCPCS | Performed by: RADIOLOGY

## 2023-02-03 PROCEDURE — 36415 COLL VENOUS BLD VENIPUNCTURE: CPT

## 2023-02-03 PROCEDURE — 87086 URINE CULTURE/COLONY COUNT: CPT | Performed by: EMERGENCY MEDICINE

## 2023-02-03 PROCEDURE — 250N000013 HC RX MED GY IP 250 OP 250 PS 637

## 2023-02-03 PROCEDURE — 99207 PR APP CREDIT; MD BILLING SHARED VISIT: CPT

## 2023-02-03 PROCEDURE — 250N000011 HC RX IP 250 OP 636: Performed by: EMERGENCY MEDICINE

## 2023-02-03 PROCEDURE — 85025 COMPLETE CBC W/AUTO DIFF WBC: CPT | Performed by: EMERGENCY MEDICINE

## 2023-02-03 PROCEDURE — 80053 COMPREHEN METABOLIC PANEL: CPT | Performed by: EMERGENCY MEDICINE

## 2023-02-03 PROCEDURE — 999N000226 HC STATISTIC SLP IP EVAL DEFER

## 2023-02-03 PROCEDURE — 120N000002 HC R&B MED SURG/OB UMMC

## 2023-02-03 PROCEDURE — 74176 CT ABD & PELVIS W/O CONTRAST: CPT | Mod: 26 | Performed by: RADIOLOGY

## 2023-02-03 PROCEDURE — 83690 ASSAY OF LIPASE: CPT | Performed by: EMERGENCY MEDICINE

## 2023-02-03 PROCEDURE — G1010 CDSM STANSON: HCPCS

## 2023-02-03 PROCEDURE — 258N000003 HC RX IP 258 OP 636

## 2023-02-03 PROCEDURE — 83605 ASSAY OF LACTIC ACID: CPT

## 2023-02-03 PROCEDURE — 36415 COLL VENOUS BLD VENIPUNCTURE: CPT | Performed by: EMERGENCY MEDICINE

## 2023-02-03 PROCEDURE — 81001 URINALYSIS AUTO W/SCOPE: CPT | Performed by: EMERGENCY MEDICINE

## 2023-02-03 PROCEDURE — 250N000013 HC RX MED GY IP 250 OP 250 PS 637: Performed by: INTERNAL MEDICINE

## 2023-02-03 PROCEDURE — 84703 CHORIONIC GONADOTROPIN ASSAY: CPT | Performed by: EMERGENCY MEDICINE

## 2023-02-03 RX ORDER — MULTIVITAMIN
1 TABLET ORAL EVERY MORNING
Status: DISCONTINUED | OUTPATIENT
Start: 2023-02-03 | End: 2023-02-03

## 2023-02-03 RX ORDER — GLYCOPYRROLATE 1 MG/1
4 TABLET ORAL 3 TIMES DAILY
Status: DISCONTINUED | OUTPATIENT
Start: 2023-02-03 | End: 2023-02-05 | Stop reason: HOSPADM

## 2023-02-03 RX ORDER — ONDANSETRON 4 MG/1
4 TABLET, FILM COATED ORAL EVERY 8 HOURS PRN
Qty: 15 TABLET | Refills: 0 | Status: SHIPPED | OUTPATIENT
Start: 2023-02-03 | End: 2024-02-27

## 2023-02-03 RX ORDER — KETOROLAC TROMETHAMINE 30 MG/ML
30 INJECTION, SOLUTION INTRAMUSCULAR; INTRAVENOUS EVERY 6 HOURS PRN
Status: ACTIVE | OUTPATIENT
Start: 2023-02-03 | End: 2023-02-04

## 2023-02-03 RX ORDER — AMOXICILLIN 250 MG
2 CAPSULE ORAL 2 TIMES DAILY
Status: DISCONTINUED | OUTPATIENT
Start: 2023-02-03 | End: 2023-02-05 | Stop reason: HOSPADM

## 2023-02-03 RX ORDER — ONDANSETRON 4 MG/1
4 TABLET, ORALLY DISINTEGRATING ORAL EVERY 6 HOURS PRN
Status: DISCONTINUED | OUTPATIENT
Start: 2023-02-03 | End: 2023-02-03

## 2023-02-03 RX ORDER — POLYETHYLENE GLYCOL 3350 17 G/17G
17 POWDER, FOR SOLUTION ORAL DAILY PRN
Status: DISCONTINUED | OUTPATIENT
Start: 2023-02-03 | End: 2023-02-05 | Stop reason: HOSPADM

## 2023-02-03 RX ORDER — CEFTRIAXONE 1 G/1
1 INJECTION, POWDER, FOR SOLUTION INTRAMUSCULAR; INTRAVENOUS EVERY 24 HOURS
Status: DISCONTINUED | OUTPATIENT
Start: 2023-02-04 | End: 2023-02-05 | Stop reason: HOSPADM

## 2023-02-03 RX ORDER — GLYCOPYRROLATE 1 MG/1
2 TABLET ORAL 3 TIMES DAILY
Status: DISCONTINUED | OUTPATIENT
Start: 2023-02-03 | End: 2023-02-03

## 2023-02-03 RX ORDER — KETOCONAZOLE 20 MG/ML
SHAMPOO TOPICAL
COMMUNITY

## 2023-02-03 RX ORDER — LIDOCAINE 40 MG/G
CREAM TOPICAL
Status: DISCONTINUED | OUTPATIENT
Start: 2023-02-03 | End: 2023-02-05 | Stop reason: HOSPADM

## 2023-02-03 RX ORDER — POLYETHYLENE GLYCOL 3350 17 G/17G
17 POWDER, FOR SOLUTION ORAL DAILY
Status: DISCONTINUED | OUTPATIENT
Start: 2023-02-03 | End: 2023-02-03

## 2023-02-03 RX ORDER — MULTIPLE VITAMINS W/ MINERALS TAB 9MG-400MCG
1 TAB ORAL EVERY MORNING
Status: DISCONTINUED | OUTPATIENT
Start: 2023-02-03 | End: 2023-02-05 | Stop reason: HOSPADM

## 2023-02-03 RX ORDER — SODIUM CHLORIDE 9 MG/ML
INJECTION, SOLUTION INTRAVENOUS CONTINUOUS
Status: DISCONTINUED | OUTPATIENT
Start: 2023-02-03 | End: 2023-02-05 | Stop reason: HOSPADM

## 2023-02-03 RX ORDER — BISACODYL 5 MG
10 TABLET, DELAYED RELEASE (ENTERIC COATED) ORAL DAILY PRN
Status: DISCONTINUED | OUTPATIENT
Start: 2023-02-03 | End: 2023-02-05 | Stop reason: HOSPADM

## 2023-02-03 RX ORDER — ONDANSETRON 2 MG/ML
4 INJECTION INTRAMUSCULAR; INTRAVENOUS EVERY 30 MIN PRN
Status: DISCONTINUED | OUTPATIENT
Start: 2023-02-03 | End: 2023-02-03

## 2023-02-03 RX ORDER — GLYCOPYRROLATE 2 MG/1
2 TABLET ORAL
COMMUNITY
Start: 2022-12-21 | End: 2023-02-03

## 2023-02-03 RX ORDER — ONDANSETRON 2 MG/ML
4 INJECTION INTRAMUSCULAR; INTRAVENOUS EVERY 6 HOURS PRN
Status: DISCONTINUED | OUTPATIENT
Start: 2023-02-03 | End: 2023-02-03

## 2023-02-03 RX ORDER — NADOLOL 20 MG/1
1 TABLET ORAL EVERY MORNING
COMMUNITY
Start: 2022-11-30

## 2023-02-03 RX ORDER — CEFTRIAXONE 1 G/1
1 INJECTION, POWDER, FOR SOLUTION INTRAMUSCULAR; INTRAVENOUS ONCE
Status: COMPLETED | OUTPATIENT
Start: 2023-02-03 | End: 2023-02-03

## 2023-02-03 RX ORDER — NADOLOL 20 MG/1
20 TABLET ORAL EVERY MORNING
Status: DISCONTINUED | OUTPATIENT
Start: 2023-02-03 | End: 2023-02-05 | Stop reason: HOSPADM

## 2023-02-03 RX ORDER — BISACODYL 5 MG
5 TABLET, DELAYED RELEASE (ENTERIC COATED) ORAL DAILY PRN
Status: DISCONTINUED | OUTPATIENT
Start: 2023-02-03 | End: 2023-02-05 | Stop reason: HOSPADM

## 2023-02-03 RX ORDER — ACETAMINOPHEN 650 MG/1
650 SUPPOSITORY RECTAL EVERY 6 HOURS PRN
Status: DISCONTINUED | OUTPATIENT
Start: 2023-02-03 | End: 2023-02-05 | Stop reason: HOSPADM

## 2023-02-03 RX ORDER — ACETAMINOPHEN 325 MG/1
650 TABLET ORAL EVERY 6 HOURS PRN
Status: DISCONTINUED | OUTPATIENT
Start: 2023-02-03 | End: 2023-02-05 | Stop reason: HOSPADM

## 2023-02-03 RX ORDER — POLYETHYLENE GLYCOL 3350 17 G/17G
17 POWDER, FOR SOLUTION ORAL 3 TIMES DAILY
Status: DISCONTINUED | OUTPATIENT
Start: 2023-02-03 | End: 2023-02-05 | Stop reason: HOSPADM

## 2023-02-03 RX ORDER — AMOXICILLIN 250 MG
1 CAPSULE ORAL 2 TIMES DAILY
Status: DISCONTINUED | OUTPATIENT
Start: 2023-02-03 | End: 2023-02-05 | Stop reason: HOSPADM

## 2023-02-03 RX ADMIN — POLYETHYLENE GLYCOL 3350 17 G: 17 POWDER, FOR SOLUTION ORAL at 15:14

## 2023-02-03 RX ADMIN — POLYETHYLENE GLYCOL 3350 17 G: 17 POWDER, FOR SOLUTION ORAL at 08:43

## 2023-02-03 RX ADMIN — SODIUM CHLORIDE: 9 INJECTION, SOLUTION INTRAVENOUS at 11:30

## 2023-02-03 RX ADMIN — POLYETHYLENE GLYCOL 3350 17 G: 17 POWDER, FOR SOLUTION ORAL at 20:15

## 2023-02-03 RX ADMIN — CEFTRIAXONE SODIUM 1 G: 1 INJECTION, POWDER, FOR SOLUTION INTRAMUSCULAR; INTRAVENOUS at 05:40

## 2023-02-03 RX ADMIN — SODIUM PHOSPHATE 1 ENEMA: 7; 19 ENEMA RECTAL at 08:46

## 2023-02-03 RX ADMIN — NADOLOL 20 MG: 20 TABLET ORAL at 08:45

## 2023-02-03 RX ADMIN — GLYCOPYRROLATE 4 MG: 1 TABLET ORAL at 20:16

## 2023-02-03 RX ADMIN — GLYCOPYRROLATE 4 MG: 1 TABLET ORAL at 15:13

## 2023-02-03 RX ADMIN — SENNOSIDES AND DOCUSATE SODIUM 1 TABLET: 50; 8.6 TABLET ORAL at 08:44

## 2023-02-03 RX ADMIN — SODIUM CHLORIDE 1000 ML: 9 INJECTION, SOLUTION INTRAVENOUS at 02:10

## 2023-02-03 RX ADMIN — SODIUM CHLORIDE: 9 INJECTION, SOLUTION INTRAVENOUS at 03:54

## 2023-02-03 RX ADMIN — Medication 1 TABLET: at 08:44

## 2023-02-03 RX ADMIN — ONDANSETRON 4 MG: 2 INJECTION INTRAMUSCULAR; INTRAVENOUS at 02:11

## 2023-02-03 RX ADMIN — SODIUM CHLORIDE: 9 INJECTION, SOLUTION INTRAVENOUS at 20:15

## 2023-02-03 RX ADMIN — SENNOSIDES AND DOCUSATE SODIUM 2 TABLET: 50; 8.6 TABLET ORAL at 20:15

## 2023-02-03 ASSESSMENT — ACTIVITIES OF DAILY LIVING (ADL)
ADLS_ACUITY_SCORE: 45
ADLS_ACUITY_SCORE: 45
ADLS_ACUITY_SCORE: 47
ADLS_ACUITY_SCORE: 37
ADLS_ACUITY_SCORE: 45
ADLS_ACUITY_SCORE: 47
ADLS_ACUITY_SCORE: 37
ADLS_ACUITY_SCORE: 45
ADLS_ACUITY_SCORE: 35

## 2023-02-03 NOTE — PHARMACY-ADMISSION MEDICATION HISTORY
"  Admission Medication History Completed by Pharmacy    See [facility] Admission Navigator for allergy information, preferred outpatient pharmacy, prior to admission medications and immunization status.     Medication History Sources:     Surescripts fill records, caregiver Neisha    Changes made to PTA medication list (reason):    Added: ketoconazole shampoo, multivitamin with iron    Deleted: multivitamin (added above)    Changed: docusate (1 capsule BID --> 1 capsule daily), glycopyrrolate (2 mg TID --> 4 mg TID)    Additional Information:    Per caregiver Neisha, patient only takes medications when they are in \"blended foods\" such as oatmeal or applesauce. Medications are not crushed.     Prior to Admission medications    Medication Sig Last Dose Taking? Auth Provider Long Term End Date   docusate sodium (COLACE) 100 MG capsule Take 1 capsule (100 mg) by mouth 2 times daily  Patient taking differently: Take 100 mg by mouth every morning  Yes Naren Bustos MD     glycopyrrolate (GLYCATE) 2 MG tablet Take 4 mg by mouth 3 times daily 2/1/2023 Yes Reported, Patient     ketoconazole (NIZORAL) 2 % external shampoo Apply topically twice a week  Yes Unknown, Entered By History     Multiple Vitamins-Iron (MULTIVITAMIN/IRON PO) Take 1 tablet by mouth daily 2/1/2023 Yes Unknown, Entered By History     nadolol (CORGARD) 20 MG tablet Take 1 tablet by mouth every morning 2/1/2023 Yes Reported, Patient Yes    polyethylene glycol (MIRALAX/GLYCOLAX) powder Take 1 capful by mouth daily 2/1/2023 Yes Reported, Patient       Date completed: 02/03/23    Medication history completed by: Jannie Nava RPH            "

## 2023-02-03 NOTE — PROVIDER NOTIFICATION
Gold 12 paged at [phone]  pts mom states pt has never had anything done vaginally without sedation and wondering if wet prep is absolutely needed?

## 2023-02-03 NOTE — H&P
[facility]    History and Physical - Hospitalist Service, GOLD TEAM        Date of Admission:  2/3/2023    Assessment & Plan      Robi Remy is a 37 year old female admitted on 2/3/2023. She has PMH of cerebral palsy, quadriplegia, non verbal, neurogenic bladder, prior Mitrofanoff procedure who presents with abdominal pain of 2 days duration associated with nausea and vomiting and found to have UTI with constipation of large stool burden and admitted for IV antibiotic, hydration, constipation management and monitoring.  Patient is nonverbal and history is received from caretaker    # UTI, recurrent   # Neurogenic bladder on Mitrofanoff   # Abdominal pain  # Nausea and vomiting    Per caretaker, patient has a relatively uneventful medical history with no recent admission to the hospital or significant medical concerns; since about 3 days ago she started to develop what seems like abdominal pain. Care givers think she had abdomina pain due to the patient  repeated positional change including bending forward which are not usual for her; together with his also had multiple episodes of vomiting and unable to eat or drink as usual over the last two days; patient also developed bloody urine output which is unusual; she has Mitrofanoff procedure; otherwise no fever, no symptoms of upper respiratory tract infections; Cough, congestion, sore throat    Patient is also on chronic daily MiraLAX for constipation.  Her usual bowel movement ranges from 2 to 4 days; she had large bowel movement 3 days ago; but she also has not taken MiraLAX since then due to reduced oral intake    Exam: patient seems dry but benign looking; no abdominal tenderness :vitals stable without hypotension or tachycardia or need for oxygen; lactic acid not done (ordered); WBC is elevated at 12.6, creatinine is normal 0.54 but BUN elevated at 40.5 (likely due to dehydration); UA suggestive of UTI  with large WBC, large leukocyte esterase, WBC clumps, and blood in urine: Lipase not elevated; CT abdomen done as a general evaluation shows large stool burden throughout small bowel with multiple dilated fluid and gas-filled areas.  Overall patient seems to be presenting with a UTI primarily with some volume deficit and acute on chronic constipation    Continue ceftriaxone 1 g daily, received first dose in ED    Pain medication was Tylenol, NSAID; avoid narcotic    Continue MIVF for now, received 2 L bolus in ED    Zofran for nausea and vomiting discontinued due to Hx of QT prolongation; likely will respond to hydration, otherwise conisider other modalities like ativan     Urinary care    Follow urine culture     # Acute on chronic constipation  # Large stool burden  # ?SBO    Has baseline chronic but mild constipation; takes daily MiraLAX sometimes with docusate; usual bowel movement ranges from 2 to 4 days; had large bowel movement 3 days ago after a dose of MiraLAX but has not been given MiraLAX since then due to reduced oral intake    Incidental CT finding with large stool burden throughout the bowel and multiple dilated loops with air and followed    Bowel regimen as needed, started with enema    NPO for now until stool burden is cleared     Consider holding glycopyrrolate the patient takes for secretions until constipation resolves     # Cerebral palsy  # Quadriplegia  # Nonverbal  # Developmental delay/ Mental retardation    Patient chronic condition is relatively stable with multiple caregivers involved: She does not have significant intercurrent illnesses or complications other than occasional UTI; caregiver indicates last hospitalization being far enough to remember;    Patient does have specific diet preference which is kind of strict because patient refuses to eat otherwise. They want to know if they can bring home diet for that reason. Please discuss with team and nursing staff if that is a  possibility. I ordered SLP eval and recommendation in the mean time.     Takes glycopyrrolate to control secretion, continue PTA glycopyrrolate    Continue PTA vitamin supplement     # Hx of long QT syndrome type 2  Per chart, The patient's father had a history of long QT syndrome and there has been a family history of sudden death in family members in their late teens and early 20s; Per care taker and chart, takes Nadolol on chronic basis prescribed by outpatient cardiologist. EKG ordered. Continue PTA nadolol     Discontinued Zofran       Diet:   Per SLP  DVT Prophylaxis: Pneumatic Compression Devices  Edouard Catheter: PRESENT, indication:    Lines: None     Cardiac Monitoring: None  Code Status:   full    Clinically Significant Risk Factors Present on Admission           # Hypercalcemia: Highest Ca = 10.5 mg/dL in last 2 days, will monitor as appropriate                     Disposition Plan      Expected Discharge Date: 02/05/2023                The patient's care was discussed with the Attending Physician, Dr. Grider and the caregivers .    Smiley Garnett PA-C  Hospitalist Service, [facility]  Securely message with [employer] info)  Text page via [facility] Paging/Directory   See signed in provider for up to date coverage information    ______________________________________________________________________    Chief Complaint   Abdominal pain    Hx is received from care taker and chart review     History of Present Illness   Robi Remy is a 37 year old female with PMH of cerebral palsy, quadriplegia, non verbal, neurogenic bladder, prior Mitrofanoff procedure who presents with abdominal pain of 2 days duration associated with nausea and vomiting. Per caretaker, patient has a relatively uneventful medical history with no recent admission to the hospital or significant medical concerns; since about 4 days ago she started to develop what seems  like abdominal pain due to the patient preference to repeated positional change including bending forward; together with his also had multiple episodes of vomiting and unable to eat or drink as usual; patient also developed bloody urine output which is unusual; she has Mitrofanoff; otherwise no fever, no symptoms of upper respiratory tract infections; cough, congestion, sore throat.      Past Medical History    Past Medical History:   Diagnosis Date     Cerebral palsy (H)      Developmental delay      Long Q-T syndrome     takes nadolol     Neurogenic bladder      Nonverbal      Self-catheterizes urinary bladder     Parents Cath patient     Spastic quadriplegia (H)        Past Surgical History   Past Surgical History:   Procedure Laterality Date     BACK SURGERY  2002    spine fusion     EXAM UNDER ANESTHESIA DENTAL  2010     EXAM UNDER ANESTHESIA PELVIC N/A 1/4/2018    Procedure: EXAM UNDER ANESTHESIA PELVIC;  Pap Smear, Breast exam, Mirena IUD removal and replacement.  Dental Exam, Radiographs, Dental Restorations, Periodontal Therapy, Dental Extractions, Biopsies   ;  Surgeon: Briseida Iyer MD;  Location: [facility] OR     EXAM UNDER ANESTHESIA, RESTORATIONS, EXTRACTION(S) DENTAL, COMBINED N/A 1/4/2018    Procedure: COMBINED EXAM UNDER ANESTHESIA, RESTORATIONS, EXTRACTION(S) DENTAL;  Dental exam, x- rays, periodontal therapy and flouride varnish;  Surgeon: Ara Urbano DDS;  Location: [facility] OR     LAPAROSCOPIC MITROFANOFF PROCEDURE (APPENDIX CONDUIT) N/A 9/4/2014    Procedure: LAPAROSCOPIC MITROFANOFF PROCEDURE (APPENDIX CONDUIT);  Surgeon: Naren Bustos MD;  [facility]: [facility] OR     LAPAROSCOPIC REPAIR BLADDER N/A 9/4/2014    Procedure: LAPAROSCOPIC REPAIR BLADDER;  Surgeon: Naren Bustos MD;  [facility]     REPLACE INTRAUTERINE DEVICE N/A 1/4/2018    Procedure: REPLACE INTRAUTERINE DEVICE;  Mirena IUD removal and replacement;  Surgeon: Briseida Iyer MD;  Location: [facility] OR        Prior to Admission Medications   Prior to Admission Medications   Prescriptions Last Dose Informant Patient Reported? Taking?   GLYCOPYRROLATE PO   Yes No   Sig: Take 2 mg by mouth 3 times daily   Multiple Vitamins-Minerals (MULTIVITAMIN OR)   Yes No   Sig: Take 1 tablet by mouth every morning    NADOLOL PO   Yes No   Sig: Take 20 mg by mouth every morning    docusate sodium (COLACE) 100 MG capsule   No No   Sig: Take 1 capsule (100 mg) by mouth 2 times daily   polyethylene glycol (MIRALAX/GLYCOLAX) powder   Yes No   Sig: Take 1 capful by mouth daily   sodium chloride 0.9%, bottle, 0.9 % irrigation   No No   Sig: Irrigate bladder with normal saline twice daily as directed.      Facility-Administered Medications: None        Review of Systems    The 10 point Review of Systems is negative other than noted in the HPI or here.      Physical Exam   Vital Signs: Temp: 98  F (36.7  C) Temp src: Axillary BP: (!) 129/93 Pulse: 84   Resp: 18 SpO2: 97 % O2 Device: None (Room air)    Weight: 95 lbs 0 oz    Constitutional: Awake, alert, non verbal, no apparent distress.  Eyes: sunken eyeball (chronic)  HEENT: Dry mucous membranes  Respiratory: Clear to auscultation bilaterally, no crackles or wheezing.  Cardiovascular: Regular rate and rhythm, normal S1 and S2, and no murmur noted.  GI: Soft, non-distended, non-tender, normal bowel sounds.  Skin: No rashes, no cyanosis, no edema.  Musculoskeletal: No joint swelling, erythema or tenderness.  Neurologic: Quadriplegia         Medical Decision Making       90 MINUTES SPENT BY ME on the date of service doing chart review, history, exam, documentation & further activities per the note.      Data     I have personally reviewed the following data over the past 24 hrs:    12.6 (H)  \   15.5   / 356     137 98 40.5 (H) /  154 (H)   4.3 26 0.54 \       ALT: 18 AST: 24 AP: 89 TBILI: 0.5   ALB: 4.6 TOT PROTEIN: 8.6 (H) LIPASE: 11 (L)       Imaging results reviewed over the past 24 hrs:    Recent Results (from the past 24 hour(s))   CT Abdomen Pelvis w/o Contrast    Narrative    EXAM: CT ABDOMEN PELVIS W/O CONTRAST  LOCATION: [facility]  DATE/TIME: 2/3/2023 4:33 AM    INDICATION: hematuria, abdominal pain  COMPARISON: Renal ultrasound 07/16/2014  TECHNIQUE: CT scan of the abdomen and pelvis was performed without IV contrast. Multiplanar reformats were obtained. Dose reduction techniques were used.  CONTRAST: None.    FINDINGS: Examination somewhat limited by extension metallic artifact from patient's thoracolumbar fusion hardware.    LOWER CHEST: Normal.    HEPATOBILIARY: Normal.    PANCREAS: Normal.    SPLEEN: Normal.    ADRENAL GLANDS: Neither adrenal gland is very well visualized however the left adrenal gland may be diffusely calcified.    KIDNEYS/BLADDER: Poor visualization of the kidneys. There is no definite hydronephrosis. A 3 mm nonobstructing calyceal calculus is noted involving the right lower pole kidney. The urinary bladder is mildly distended. Prior Mitrofanoff procedure with   appendicovesicostomy appearing to connect to the skin surface at the level of the umbilicus. There are calcifications noted along the course of the appendicovesicostomy, uncertain etiology but favored to be be chronic. Nephroliths less favored given the   extent of calcifications.    BOWEL: Massive stool throughout the colon consistent with constipation. There are also multiple dilated gas and fluid-filled loops of small bowel which extend to the level of the cecum favored to be related to constipation rather than a separate bowel   obstruction. Small volume ascites.    LYMPH NODES: No discrete lymphadenopathy though limited visualization.    VASCULATURE: Unremarkable.    PELVIC ORGANS: Intrauterine device present.    MUSCULOSKELETAL: Scoliosis with extensive thoracolumbar fusion hardware.      Impression    IMPRESSION:   1.  Examination somewhat limited by extensive  metallic artifact from patient's thoracolumbar fusion hardware.  2.  Massive stool throughout the colon consistent with constipation. There are also multiple dilated gas and fluid-filled loops of small bowel which extends the level of the cecum, favored to be related to constipation rather than a separate bowel   obstruction. If pain persists following resolution of constipation, repeat CT imaging could be considered to reassess small bowel.  3.  Poor visualization of the kidneys due to metallic artifact. No definite hydronephrosis. 3 mm nonobstructing calyceal calculus involving the right lower pole kidney.  4.  Prior Mitrofanoff procedure with appendicovesicostomy appearing to connect to the skin surface at the level of the umbilicus. There are calcifications noted along the course of the appendicovesicostomy, uncertain etiology but favored to be chronic.   Nephroliths less favored given the extent of calcifications. Priors if available may be helpful for comparison purposes.

## 2023-02-03 NOTE — ED NOTES
Emergency Department Patient Sign-out       Brief HPI and ED course:  Patient is a 37 year old female signed out to me by Dr. Briggs.  See initial ED Provider note for details of the presentation. In brief, hx cerebral palsy presenting with hematuria. Has Mitrofanoff. Vomited couple times. Not tolerating PO well past few days. WBC 12.6.     Vitals:   Patient Vitals for the past 24 hrs:   BP Temp Temp src Pulse Resp SpO2 Height Weight   02/02/23 2230 (!) 129/93 98  F (36.7  C) Axillary 84 18 97 % 1.524 m (5') 43.1 kg (95 lb)       Received Sign-out Plan:    Pending studies include: UA, CMP, lipase, HCG, wet prep, CT abd/pelvis     Plan:   - f/u above studies  - if studies unremarkable then potential DC if tolerating PO w/ antiemetics (puree diet)    Events after assuming care:  After care was assumed, a focused history and physical was performed. Agree with findings relayed by previous provider.     UA consistent with UTI versus possible colonization.  Serum WBC elevated raising suspicion of UTI rather than colonization.  Ceftriaxone given.    CT demonstrated very large stool burden with signs of small bowel distention as a secondary effect.  Patient's caregivers report that she has not eaten in the past couple days and has had vomiting after drinking fluid.    Admission indicated for IV fluid support given lack of p.o. intake with vomiting in the context of cerebral palsy.  Patient admitted to medicine.     --  Dylan Almazan MD   Emergency Medicine       Dylan Almazan MD  02/03/23 0636

## 2023-02-03 NOTE — PLAN OF CARE
"Deferral - SLP orders received for swallow eval d/t \"underlying cerebral palsy, family request specific soft diet.\" Chart reviewed and discussed with pt's caregiver. Pt is nonverbal at baseline. Per caregiver, pt is on a specific puree diet (\"oatmeal\" consistency, not too runny). Pt's caregiver denies any changes to swallow function. Pt observed drinking water from cup with assistance from caregiver; no overt s/sx of aspiration. SLP discussed hospital's puree diet; pt's caregiver expressed preference for bringing in a  to accommodate pt's preferences vs hospital modification. Discussed with RN. Note that at baseline, pt takes meds crushed in applesauce. SLP will defer evaluation at this time and complete orders, will ryann KAPLAN. Please reconsult SLP with changes in swallow function or communication skills.   "

## 2023-02-03 NOTE — ED PROVIDER NOTES
History     Chief Complaint   Patient presents with     Hematuria     HPI  Robi Remy is a 37 year old female with a past medical history of cerebral palsy, neurogenic bladder, prior Mitrofanoff procedure who presents to the emergency department with a chief complaint of hematuria.  The patient presents to the emergency department accompanied by her caregivers who provide the history.  They state that they have been taking care of of the patient for many years and her behavior over the past 2 days has been very atypical for her.  They reports that they have noticed some blood in her urine, which is not typical for her.  They states she typically has mucus in her urine, but the blood is abnormal.  They also note that her vaginal discharge has had a scant amount of blood in it as well, although otherwise the vaginal discharge is normal for her.  The patient has seemed very uncomfortable and has been frequently leaning forward, causing them to believe that she has had abdominal pain.  The patient has also had some nausea and vomiting and has had very poor p.o. intake.  They note she tried to drink some milk today, managed to drink 2 glasses and then vomited everything up.  They have not given her any medications for her symptoms.  They do note that the vomiting started about 2 days ago while she was at her mother's house, prompting her mother to contact her caregivers to arrange for patient to stay home from her day program, which she did yesterday.  The patient does have a history of urinary tract infections, but she does not have a known history of kidney stones.  Other than her Mitrofanoff procedure, no reported history of abdominal surgeries.  She has had back surgery.    The patient does have a Mirena IUD in place to suppress menstruation.  She does not typically have any breakthrough bleeding and has had a Mirena in place for many years (it was replaced a couple of years ago per her  caregivers).    The patient has not been able to take her regular medications as she usually takes these with food and has not eaten much over the past 2 days.    Patient unable to provide history herself as she is nonverbal due to her history of cerebral palsy.    I have reviewed the Medications, Allergies, Past Medical and Surgical History, and Social History in the [facility] system.    Past Medical History:   Diagnosis Date     Cerebral palsy (H)      Developmental delay      Long Q-T syndrome     takes nadolol     Neurogenic bladder      Nonverbal      Self-catheterizes urinary bladder     Parents Cath patient     Spastic quadriplegia (H)      Past Surgical History:   Procedure Laterality Date     BACK SURGERY  2002    spine fusion     EXAM UNDER ANESTHESIA DENTAL  2010     EXAM UNDER ANESTHESIA PELVIC N/A 1/4/2018    Procedure: EXAM UNDER ANESTHESIA PELVIC;  Pap Smear, Breast exam, Mirena IUD removal and replacement.  Dental Exam, Radiographs, Dental Restorations, Periodontal Therapy, Dental Extractions, Biopsies   ;  Surgeon: Briseida Iyer MD;  Location: [facility] OR     EXAM UNDER ANESTHESIA, RESTORATIONS, EXTRACTION(S) DENTAL, COMBINED N/A 1/4/2018    Procedure: COMBINED EXAM UNDER ANESTHESIA, RESTORATIONS, EXTRACTION(S) DENTAL;  Dental exam, x- rays, periodontal therapy and flouride varnish;  Surgeon: Ara Urbano DDS;  Location: [facility] OR     LAPAROSCOPIC MITROFANOFF PROCEDURE (APPENDIX CONDUIT) N/A 9/4/2014    Procedure: LAPAROSCOPIC MITROFANOFF PROCEDURE (APPENDIX CONDUIT);  Surgeon: Naren Bustos MD;  Location: [facility] OR     LAPAROSCOPIC REPAIR BLADDER N/A 9/4/2014    Procedure: LAPAROSCOPIC REPAIR BLADDER;  Surgeon: Naren Bustos MD;  Location: [facility] OR     REPLACE INTRAUTERINE DEVICE N/A 1/4/2018    Procedure: REPLACE INTRAUTERINE DEVICE;  Mirena IUD removal and replacement;  Surgeon: Briseida Iyer MD;  Location: [facility] OR     No current facility-administered medications for this  encounter.     Current Outpatient Medications   Medication     docusate sodium (COLACE) 100 MG capsule     GLYCOPYRROLATE PO     Multiple Vitamins-Minerals (MULTIVITAMIN OR)     NADOLOL PO     polyethylene glycol (MIRALAX/GLYCOLAX) powder     sodium chloride 0.9%, bottle, 0.9 % irrigation     Facility-Administered Medications Ordered in Other Encounters   Medication     levonorgestrel (MIRENA) 20 MCG/24HR IUD     Allergies   Allergen Reactions     Tape [Adhesive Tape]      Skin blistering  Tape had been on for one month without changing. Unsure what tape     Past medical history, past surgical history, medications, and allergies were reviewed with the patient. Additional pertinent items: None    Social History     Socioeconomic History     Marital status: Single     Spouse name: Not on file     Number of children: Not on file     Years of education: Not on file     Highest education level: Not on file   Occupational History     Not on file   Tobacco Use     Smoking status: Never     Smokeless tobacco: Never   Substance and Sexual Activity     Alcohol use: No     Drug use: No     Sexual activity: Not on file   Other Topics Concern     Not on file   Social History Narrative     Not on file     Social Determinants of Health     Financial Resource Strain: Not on file   Food Insecurity: Not on file   Transportation Needs: Not on file   Physical Activity: Not on file   Stress: Not on file   Social Connections: Not on file   Intimate Partner Violence: Not on file   Housing Stability: Not on file     Social history was reviewed with the patient. Additional pertinent items: None    Review of Systems  A medically appropriate review of systems was performed with pertinent positives and negatives noted in the HPI, and all other systems negative.    Physical Exam   BP: (!) 129/93  Pulse: 84  Temp: 98  F (36.7  C)  Resp: 18  Height: 152.4 cm (5')  Weight: 43.1 kg (95 lb)  SpO2: 97 %      General: Thin patient, frequently leaning  forward (abnormal behavior per caregivers)  HEENT: EOMI, anicteric. NCAT, MMM  Neck: no jugular venous distension, supple, nl ROM  Cardiac: Regular rate and rhythm. No murmurs, rubs, or gallops. Normal S1, S2.  Intact peripheral pulses  Pulm: CTAB, no stridor, wheezes, rales, rhonchi  Abd: Soft, distended.  No masses palpated.  Skin: Warm and dry to the touch.  No rash  Extremities: No LE edema, no cyanosis, w/w/p  Neuro: Alert and at baseline per caregivers    Pt's caregivers did bring in specimens of pt's urine and vaginal discharge, these both appear to contain a very small amount of blood. Urine contains mucus as well.     ED Course        Procedures                           Labs Ordered and Resulted from Time of ED Arrival to Time of ED Departure - No data to display         Results for orders placed or performed during the hospital encounter of 02/03/23 (from the past 24 hour(s))   CBC with platelets differential    Narrative    The following orders were created for panel order CBC with platelets differential.  Procedure                               Abnormality         Status                     ---------                               -----------         ------                     CBC with platelets and d...[[id number]]                                                   Please view results for these tests on the individual orders.       Labs, vital signs, and imaging studies were reviewed by me.    Medications   0.9% sodium chloride BOLUS (has no administration in time range)     Followed by   sodium chloride 0.9% infusion (has no administration in time range)   ondansetron (ZOFRAN) injection 4 mg (has no administration in time range)       Assessments & Plan (with Medical Decision Making)   Robi Remy is a 37 year old female who presents to the emergency department with hematuria, bloody tinged vaginal discharge, abdominal pain, and nausea.  Differential diagnosis includes urinary tract  infection, nephrolithiasis, SBO.  Given small amount of blood in patient's vaginal discharge, differential diagnosis would include breakthrough bleeding while on mirena, yeast infection, bacterial vaginosis, STI, pregnancy, local irritation. Labs, UA, wet prep, CT of the abdomen and pelvis ordered to further evaluate the patient. Zofran given for nausea relief in the ER.     Medical Decision Making  The patient presented with a problem that is a chronic illness mild to moderate exacerbation, progression, or side effect of treatment and an acute illness with systemic symptoms.    The patient's evaluation involved:  an assessment requiring an independent historian (caregivers, see HPI)  ordering and/or review of 3+ test(s) in this encounter (see separate area of note for details)    The patient's management involved prescription drug management (including medications given in the ED) and limitations due to social determinants of health (pt with guardian).    I have reviewed the nursing notes.    I have reviewed the findings, diagnosis, plan and need for follow up with the patient.    Patient to be signed out to oncoming provider, Dr. Almazan. Workup pending at this time, disposition pending results. If workup is negative, patient will need to be able to tolerate PO (purees/liquids as this is her usual diet) in order to be discharged home.    New Prescriptions    ONDANSETRON (ZOFRAN) 4 MG TABLET    Take 1 tablet (4 mg) by mouth every 8 hours as needed       Final diagnoses:   Abdominal distension   Hematuria, unspecified type   Nausea       Namrata Briggs MD  2/2/2023   [facility] EMERGENCY DEPARTMENT     Namrata Briggs MD  02/03/23 0149

## 2023-02-03 NOTE — DISCHARGE INSTRUCTIONS
TODAY'S VISIT:  You were seen today for abdominal pain   -   - If you had any labs or imaging/radiology tests performed today, you should also discuss these tests with your usual provider.     FOLLOW-UP:  Please make an appointment to follow up with:  - Your Primary Care Provider. If you do not have a PCP, please call the Primary Care Center (phone: [phone] for an appointment    - Have your provider review the results from today's visit with you again to make sure no further follow-up or additional testing is needed based on those results.     RETURN TO THE EMERGENCY DEPARTMENT  Return to the Emergency Department at any time for any new or worsening symptoms or any concerns.

## 2023-02-03 NOTE — ED TRIAGE NOTES
Patient to Er with caregivers. They stated that she has been having some abd pain and nausea and vomiting for the last couple of days. They also stated they have seen blood in her urine and more mucus than normal.      Triage Assessment     Row Name 02/02/23 9884       Triage Assessment (Adult)    Airway WDL WDL       Respiratory WDL    Respiratory WDL WDL       Skin Circulation/Temperature WDL    Skin Circulation/Temperature WDL WDL       Cardiac WDL    Cardiac WDL WDL       Peripheral/Neurovascular WDL    Peripheral Neurovascular WDL WDL       Cognitive/Neuro/Behavioral WDL    Cognitive/Neuro/Behavioral WDL WDL

## 2023-02-04 LAB
ALBUMIN SERPL BCG-MCNC: 3.4 G/DL (ref 3.5–5.2)
ALP SERPL-CCNC: 56 U/L (ref 35–104)
ALT SERPL W P-5'-P-CCNC: 12 U/L (ref 10–35)
ANION GAP SERPL CALCULATED.3IONS-SCNC: 10 MMOL/L (ref 7–15)
AST SERPL W P-5'-P-CCNC: 30 U/L (ref 10–35)
BACTERIA UR CULT: NORMAL
BASOPHILS # BLD AUTO: 0 10E3/UL (ref 0–0.2)
BASOPHILS NFR BLD AUTO: 0 %
BILIRUB SERPL-MCNC: 0.4 MG/DL
BUN SERPL-MCNC: 20.2 MG/DL (ref 6–20)
CALCIUM SERPL-MCNC: 8.4 MG/DL (ref 8.6–10)
CHLORIDE SERPL-SCNC: 110 MMOL/L (ref 98–107)
CREAT SERPL-MCNC: 0.45 MG/DL (ref 0.51–0.95)
DEPRECATED HCO3 PLAS-SCNC: 22 MMOL/L (ref 22–29)
EOSINOPHIL # BLD AUTO: 0 10E3/UL (ref 0–0.7)
EOSINOPHIL NFR BLD AUTO: 0 %
ERYTHROCYTE [DISTWIDTH] IN BLOOD BY AUTOMATED COUNT: 13.8 % (ref 10–15)
GFR SERPL CREATININE-BSD FRML MDRD: >90 ML/MIN/1.73M2
GLUCOSE SERPL-MCNC: 118 MG/DL (ref 70–99)
HCT VFR BLD AUTO: 40 % (ref 35–47)
HGB BLD-MCNC: 12.6 G/DL (ref 11.7–15.7)
IMM GRANULOCYTES # BLD: 0 10E3/UL
IMM GRANULOCYTES NFR BLD: 0 %
LYMPHOCYTES # BLD AUTO: 1.6 10E3/UL (ref 0.8–5.3)
LYMPHOCYTES NFR BLD AUTO: 17 %
MCH RBC QN AUTO: 27.1 PG (ref 26.5–33)
MCHC RBC AUTO-ENTMCNC: 31.5 G/DL (ref 31.5–36.5)
MCV RBC AUTO: 86 FL (ref 78–100)
MONOCYTES # BLD AUTO: 1.1 10E3/UL (ref 0–1.3)
MONOCYTES NFR BLD AUTO: 12 %
NEUTROPHILS # BLD AUTO: 6.3 10E3/UL (ref 1.6–8.3)
NEUTROPHILS NFR BLD AUTO: 71 %
NRBC # BLD AUTO: 0 10E3/UL
NRBC BLD AUTO-RTO: 0 /100
PLATELET # BLD AUTO: 260 10E3/UL (ref 150–450)
POTASSIUM SERPL-SCNC: 3.8 MMOL/L (ref 3.4–5.3)
PROT SERPL-MCNC: 5.9 G/DL (ref 6.4–8.3)
RBC # BLD AUTO: 4.65 10E6/UL (ref 3.8–5.2)
SODIUM SERPL-SCNC: 142 MMOL/L (ref 136–145)
WBC # BLD AUTO: 9.1 10E3/UL (ref 4–11)

## 2023-02-04 PROCEDURE — 250N000013 HC RX MED GY IP 250 OP 250 PS 637

## 2023-02-04 PROCEDURE — 250N000013 HC RX MED GY IP 250 OP 250 PS 637: Performed by: STUDENT IN AN ORGANIZED HEALTH CARE EDUCATION/TRAINING PROGRAM

## 2023-02-04 PROCEDURE — 250N000011 HC RX IP 250 OP 636

## 2023-02-04 PROCEDURE — 99232 SBSQ HOSP IP/OBS MODERATE 35: CPT | Performed by: STUDENT IN AN ORGANIZED HEALTH CARE EDUCATION/TRAINING PROGRAM

## 2023-02-04 PROCEDURE — 85025 COMPLETE CBC W/AUTO DIFF WBC: CPT

## 2023-02-04 PROCEDURE — 82374 ASSAY BLOOD CARBON DIOXIDE: CPT

## 2023-02-04 PROCEDURE — 36415 COLL VENOUS BLD VENIPUNCTURE: CPT

## 2023-02-04 PROCEDURE — 250N000013 HC RX MED GY IP 250 OP 250 PS 637: Performed by: INTERNAL MEDICINE

## 2023-02-04 PROCEDURE — 120N000002 HC R&B MED SURG/OB UMMC

## 2023-02-04 PROCEDURE — 258N000003 HC RX IP 258 OP 636

## 2023-02-04 RX ORDER — POLYETHYLENE GLYCOL 3350 17 G/17G
17 POWDER, FOR SOLUTION ORAL DAILY
Qty: 510 G | Refills: 0 | Status: SHIPPED | OUTPATIENT
Start: 2023-02-04 | End: 2023-02-04

## 2023-02-04 RX ORDER — POLYETHYLENE GLYCOL 3350 17 G/17G
17 POWDER, FOR SOLUTION ORAL 2 TIMES DAILY
Qty: 510 G | Refills: 0 | Status: SHIPPED | OUTPATIENT
Start: 2023-02-04

## 2023-02-04 RX ADMIN — SODIUM CHLORIDE: 9 INJECTION, SOLUTION INTRAVENOUS at 03:54

## 2023-02-04 RX ADMIN — SODIUM PHOSPHATE 1 ENEMA: 7; 19 ENEMA RECTAL at 11:51

## 2023-02-04 RX ADMIN — CEFTRIAXONE SODIUM 1 G: 1 INJECTION, POWDER, FOR SOLUTION INTRAMUSCULAR; INTRAVENOUS at 06:40

## 2023-02-04 RX ADMIN — Medication 1 TABLET: at 08:21

## 2023-02-04 RX ADMIN — SODIUM CHLORIDE: 9 INJECTION, SOLUTION INTRAVENOUS at 12:26

## 2023-02-04 RX ADMIN — SENNOSIDES AND DOCUSATE SODIUM 2 TABLET: 50; 8.6 TABLET ORAL at 08:21

## 2023-02-04 RX ADMIN — NADOLOL 20 MG: 20 TABLET ORAL at 08:21

## 2023-02-04 RX ADMIN — POLYETHYLENE GLYCOL 3350 17 G: 17 POWDER, FOR SOLUTION ORAL at 08:21

## 2023-02-04 ASSESSMENT — ACTIVITIES OF DAILY LIVING (ADL)
ADLS_ACUITY_SCORE: 74
DIFFICULTY_EATING/SWALLOWING: YES
SWALLOWING: 0-->SWALLOWS FOODS/LIQUIDS WITHOUT DIFFICULTY
ADLS_ACUITY_SCORE: 74
DOING_ERRANDS_INDEPENDENTLY_DIFFICULTY: YES
ADLS_ACUITY_SCORE: 68
ADLS_ACUITY_SCORE: 47
TRANSFERRING: 1-->ASSISTANCE (EQUIPMENT/PERSON) NEEDED (NOT DEVELOPMENTALLY APPROPRIATE)
ADLS_ACUITY_SCORE: 67
BATHING: 2-->COMPLETELY DEPENDENT (NOT DEVELOPMENTALLY APPROPRIATE)
CONCENTRATING,_REMEMBERING_OR_MAKING_DECISIONS_DIFFICULTY: YES
DRESSING/BATHING: DRESSING DIFFICULTY, DEPENDENT;BATHING DIFFICULTY, DEPENDENT
DRESSING/BATHING_MANAGEMENT: CAREGIVER
TOILETING_ASSISTANCE: TOILETING DIFFICULTY, DEPENDENT
ADLS_ACUITY_SCORE: 67
EATING: 2-->COMPLETELY DEPENDENT (NOT DEVELOPMENTALLY APPROPRIATE)
ADLS_ACUITY_SCORE: 74
SWALLOWING: 0-->SWALLOWS FOODS/LIQUIDS WITHOUT DIFFICULTY (DEVELOPMENTALLY APPROPRIATE)
ADLS_ACUITY_SCORE: 67
ADLS_ACUITY_SCORE: 57
WEAR_GLASSES_OR_BLIND: NO
ADLS_ACUITY_SCORE: 74
ADLS_ACUITY_SCORE: 67
EATING/SWALLOWING_MANAGEMENT: PUREED
WALKING_OR_CLIMBING_STAIRS_DIFFICULTY: YES
EATING: 1-->ASSISTANCE (EQUIPMENT/PERSON) NEEDED
TOILETING: 2-->COMPLETELY DEPENDENT
DRESS: 2-->COMPLETELY DEPENDENT
TOILETING: 2-->COMPLETELY DEPENDENT (NOT DEVELOPMENTALLY APPROPRIATE)
TOILETING_ISSUES: YES
CHANGE_IN_FUNCTIONAL_STATUS_SINCE_ONSET_OF_CURRENT_ILLNESS/INJURY: NO
DRESS: 2-->COMPLETELY DEPENDENT (NOT DEVELOPMENTALLY APPROPRIATE)
ADLS_ACUITY_SCORE: 74
FALL_HISTORY_WITHIN_LAST_SIX_MONTHS: NO
EATING/SWALLOWING: OTHER (SEE COMMENTS);EATING
TRANSFERRING: 1-->ASSISTANCE (EQUIPMENT/PERSON) NEEDED
DRESSING/BATHING_DIFFICULTY: YES

## 2023-02-04 NOTE — PROGRESS NOTES
[facility]    Medicine Progress Note - Hospitalist Service, GOLD TEAM 12    Date of Admission:  2/3/2023    Assessment & Plan   Robi Remy is a 37 year old female admitted on 2/3/2023. She has PMH of cerebral palsy, quadriplegia, non verbal, neurogenic bladder, prior Mitrofanoff procedure who presents with abdominal pain of 2 days duration associated with nausea and vomiting and found to have UTI with constipation of large stool burden and admitted for IV antibiotic, hydration, constipation management and monitoring.  Patient is nonverbal and history is received from caretaker    Update:  - If urine Cx is neg will treat with CTX for 3 days only.   - Fleet enema as pt did not have a BM yet.   - Encourage oral intake after BM   - Possible discharge tomorrow of BM and able to tolerate diet      # UTI, recurrent   # Neurogenic bladder on Mitrofanoff   # Abdominal pain  # Nausea and vomiting  - Per caretaker, patient has a relatively uneventful medical history with no recent admission to the hospital or significant medical concerns; 3 days prior to admission, she started to develop what seems like abdominal pain. She had multiple episodes of vomiting and unable to eat or drink; patient also developed bloody urine output which is unusual; she has Mitrofanoff procedure; otherwise no fever, no symptoms of upper respiratory tract infections; Cough, congestion, sore throat  - Patient is also on chronic daily MiraLAX for constipation.  Her usual bowel movement ranges from 2 to 4 days; she had large bowel movement 3 days prior to admisison; but she also has not taken MiraLAX since then due to reduced oral intake  - WBC is elevated at 12.6, creatinine is normal 0.54. UA suggestive of UTI with large WBC, large leukocyte esterase, WBC clumps, and blood in urine: Lipase not elevated; CT abdomen done as a general evaluation shows large stool burden throughout small bowel  with multiple dilated fluid and gas-filled areas.  Overall patient seems to be presenting with a UTI primarily with some volume deficit and acute on chronic constipation. Typically grows pansensitive E. coli, but has had sensitive Pseudomonas and Enterococcus on Singulair occasions  Plan:  - Continue ceftriaxone 1 g daily, received first dose in ED SOT 02/03 will plan to treat for 3 days only especially if urine Cx came back neg   - Pain medication was Tylenol, NSAID; avoid narcotic  - Continue MIVF for now  - Zofran for nausea and vomiting discontinued due to Hx of QT prolongation; likely will respond to hydration  - Urinary care  - Follow urine culture      # Acute on chronic constipation  # Large stool burden  - As above findings  Plan  - Bowel regimen as needed, Another Enema today 02/04  -Tolerating oral intake so will give MiraLAX 3 times daily. counseled her mother and her caregiver that she will likely need increased bowel regimen going forward and recommended 2-3 times daily MiraLAX with goal of multiple soft stools daily for at least several weeks to give her colonic tone time to recover     # Cerebral palsy  # Quadriplegia  # Nonverbal  # Developmental delay/ Mental retardation  - Patient chronic condition is relatively stable with multiple caregivers involved: She does not have significant intercurrent illnesses or complications other than occasional UTI; caregiver indicates last hospitalization being far enough to remember;  - SLP consulted. Continue pureed diet   - Takes glycopyrrolate to control secretion, continue PTA glycopyrrolate  - Continue PTA vitamin supplement      # Hx of long QT syndrome type 2  - Per chart, The patient's father had a history of long QT syndrome and there has been a family history of sudden death in family members in their late teens and early 20s; Per care taker and chart, takes Nadolol on chronic basis prescribed by outpatient cardiologist. EKG ordered. Continue PTA  nadolol   - Discontinued Zofran       Diet: Pureed Diet (level 4) Thin Liquids (level 0)    DVT Prophylaxis: Pneumatic Compression Devices  Edouard Catheter: PRESENT, indication:    Lines: None     Cardiac Monitoring: None  Code Status: Full Code      Clinically Significant Risk Factors          # Hypocalcemia: Lowest Ca = 8.4 mg/dL in last 2 days, will monitor and replace as appropriate  # Hypercalcemia: Highest Ca = 10.5 mg/dL in last 2 days, will monitor as appropriate    # Hypoalbuminemia: Lowest albumin = 3.4 g/dL at 2/4/2023  5:38 AM, will monitor as appropriate                    Disposition Plan      Expected Discharge Date: 02/05/2023        Discharge Comments: Pending improvement of UTI, Follow up Urine Cx. Follow up BM          KAREEM PAINTING MD  Hospitalist Service, GOLD TEAM 12  [facility]  Securely message with [employer] (more info)  Text page via [facility] Paging/Directory   See signed in provider for up to date coverage information  ______________________________________________________________________    Interval History   No acute events overnight. Non verbal. Dad at bedside. Updated with plan.     Physical Exam   Vital Signs: Temp: 98.6  F (37  C) Temp src: Axillary BP: 116/74 Pulse: 67   Resp: 18 SpO2: 95 % O2 Device: None (Room air)    Weight: 95 lbs 0 oz    Constitutional: Non verbal, Lying in bed   GI: Distended. NTTP    Medical Decision Making       40 MINUTES SPENT BY ME on the date of service doing chart review, history, exam, documentation & further activities per the note.      Data   ------------------------- PAST 24 HR DATA REVIEWED -----------------------------------------------

## 2023-02-04 NOTE — PLAN OF CARE
Activity:turn/reposition, patient non-verbal  Neuros:alert, non-verbal  Cardiac:regular  Respiratory:room air  GI/:fleets enema administered/4 bowel movements this shift, family will straight cath  Diet:pureed, thin liquids patient takes meds whole in applesauce  Skin:clean,dry, intact  Lines/Drains:PIV 0.9 at 125/hour  Plan:possible discharge 2/5

## 2023-02-04 NOTE — PLAN OF CARE
Admitted/transferred from: ED  2 RN full   skin assessment completed by Bárbara Matias, RN and mello YUSUF RN.  Skin assessment finding: issues found mitroffanoff, healed scar on lumbar spine, blanchable redness to BL feet/heels   Interventions/actions: other offered air mattress, refused. offered pillows, refused. will continue to monitor.      Bedside Emergency Equipment Present:  Suction Regulator: Yes  Suction Canister: Yes  Tubing between Regulator and Canister: Yes  O2 Regulator with Tree: Yes  Ambu Bag: Yes

## 2023-02-04 NOTE — PLAN OF CARE
Temp: 98.6  F (37  C) Temp src: Axillary BP: 116/74 Pulse: 67   Resp: 18 SpO2: 95 % O2 Device: None (Room air)         Time of care: 0130-0730  Reason for admission: UTI, N/V and constipation    NEURO: CHERRIE, pt is nonverbal. Alert and awakens appropriately. Can move upper extremities, uses special equipment at home to pivot transfer.   RESPIRATORY: sats 95% on RA, LSC.   CARDIAC: vss   GI/: lainey, caregiver straight catheterizing. Nani urine w/ mucus. Firm abdomen, no BM this shift. Hypo BS. Performed gentle abdominal massage.   DIET: pureed diet w/ thin liquids. meds whole in puree.   PAIN/NAUSEA: appears comfortable, no emesis.   INCISION/DRAINS/SKIN: lainey. 2 RN skin check.   IV ACCESS: PIV w/ NS @125ml/hr   ACTIVITY: full assistance, pt's caregiver at bedside  LAB: reviewed  CHANGES: admitted to unit   PLAN: continue w/ POC.

## 2023-02-04 NOTE — PROGRESS NOTES
Reason for Admission:  blood in urine     RN assumed cares at 0900     Pain: none of pts nonverbal signs of pain present throughout shift  Neuro: pt is non verbal,  CP  Cardiac: slightly  hypersentive  Respiratory: WDL  GI/: constipation,Mitrofanoff for urine, mucus normal blood abnormal, pt also has some blood in her vaginal discharge that is not normal    IV/Drains: R arm PIV  Activity:  Total assist, caregiver at bedside       Changes on Shift: none , no BM this shift

## 2023-02-05 VITALS
WEIGHT: 95 LBS | BODY MASS INDEX: 18.65 KG/M2 | RESPIRATION RATE: 16 BRPM | SYSTOLIC BLOOD PRESSURE: 93 MMHG | TEMPERATURE: 96.7 F | OXYGEN SATURATION: 95 % | HEIGHT: 60 IN | HEART RATE: 83 BPM | DIASTOLIC BLOOD PRESSURE: 53 MMHG

## 2023-02-05 LAB
ALBUMIN SERPL BCG-MCNC: 2.5 G/DL (ref 3.5–5.2)
ALP SERPL-CCNC: 46 U/L (ref 35–104)
ALT SERPL W P-5'-P-CCNC: 5 U/L (ref 10–35)
ANION GAP SERPL CALCULATED.3IONS-SCNC: 9 MMOL/L (ref 7–15)
AST SERPL W P-5'-P-CCNC: 19 U/L (ref 10–35)
BILIRUB SERPL-MCNC: 0.3 MG/DL
BUN SERPL-MCNC: 10.7 MG/DL (ref 6–20)
CALCIUM SERPL-MCNC: 7.6 MG/DL (ref 8.6–10)
CHLORIDE SERPL-SCNC: 116 MMOL/L (ref 98–107)
CREAT SERPL-MCNC: 0.4 MG/DL (ref 0.51–0.95)
DEPRECATED HCO3 PLAS-SCNC: 19 MMOL/L (ref 22–29)
ERYTHROCYTE [DISTWIDTH] IN BLOOD BY AUTOMATED COUNT: 13.7 % (ref 10–15)
GFR SERPL CREATININE-BSD FRML MDRD: >90 ML/MIN/1.73M2
GLUCOSE SERPL-MCNC: 86 MG/DL (ref 70–99)
HCT VFR BLD AUTO: 33.8 % (ref 35–47)
HGB BLD-MCNC: 10.6 G/DL (ref 11.7–15.7)
MCH RBC QN AUTO: 27 PG (ref 26.5–33)
MCHC RBC AUTO-ENTMCNC: 31.4 G/DL (ref 31.5–36.5)
MCV RBC AUTO: 86 FL (ref 78–100)
PLATELET # BLD AUTO: 226 10E3/UL (ref 150–450)
POTASSIUM SERPL-SCNC: 3 MMOL/L (ref 3.4–5.3)
PROT SERPL-MCNC: 4.5 G/DL (ref 6.4–8.3)
RBC # BLD AUTO: 3.92 10E6/UL (ref 3.8–5.2)
SODIUM SERPL-SCNC: 144 MMOL/L (ref 136–145)
WBC # BLD AUTO: 10.1 10E3/UL (ref 4–11)

## 2023-02-05 PROCEDURE — 250N000013 HC RX MED GY IP 250 OP 250 PS 637

## 2023-02-05 PROCEDURE — 85027 COMPLETE CBC AUTOMATED: CPT | Performed by: STUDENT IN AN ORGANIZED HEALTH CARE EDUCATION/TRAINING PROGRAM

## 2023-02-05 PROCEDURE — 250N000013 HC RX MED GY IP 250 OP 250 PS 637: Performed by: INTERNAL MEDICINE

## 2023-02-05 PROCEDURE — 80053 COMPREHEN METABOLIC PANEL: CPT | Performed by: STUDENT IN AN ORGANIZED HEALTH CARE EDUCATION/TRAINING PROGRAM

## 2023-02-05 PROCEDURE — 250N000011 HC RX IP 250 OP 636

## 2023-02-05 PROCEDURE — 36415 COLL VENOUS BLD VENIPUNCTURE: CPT | Performed by: STUDENT IN AN ORGANIZED HEALTH CARE EDUCATION/TRAINING PROGRAM

## 2023-02-05 PROCEDURE — 250N000013 HC RX MED GY IP 250 OP 250 PS 637: Performed by: STUDENT IN AN ORGANIZED HEALTH CARE EDUCATION/TRAINING PROGRAM

## 2023-02-05 PROCEDURE — 99239 HOSP IP/OBS DSCHRG MGMT >30: CPT | Performed by: STUDENT IN AN ORGANIZED HEALTH CARE EDUCATION/TRAINING PROGRAM

## 2023-02-05 RX ORDER — POTASSIUM CHLORIDE 20MEQ/15ML
40 LIQUID (ML) ORAL ONCE
Status: COMPLETED | OUTPATIENT
Start: 2023-02-05 | End: 2023-02-05

## 2023-02-05 RX ORDER — AMOXICILLIN 250 MG
1 CAPSULE ORAL DAILY PRN
Qty: 30 TABLET | Refills: 1 | Status: ON HOLD | OUTPATIENT
Start: 2023-02-05 | End: 2024-03-04

## 2023-02-05 RX ADMIN — Medication 1 TABLET: at 08:57

## 2023-02-05 RX ADMIN — CEFTRIAXONE SODIUM 1 G: 1 INJECTION, POWDER, FOR SOLUTION INTRAMUSCULAR; INTRAVENOUS at 06:52

## 2023-02-05 RX ADMIN — POLYETHYLENE GLYCOL 3350 17 G: 17 POWDER, FOR SOLUTION ORAL at 08:57

## 2023-02-05 RX ADMIN — POTASSIUM CHLORIDE 40 MEQ: 40 SOLUTION ORAL at 08:59

## 2023-02-05 RX ADMIN — SENNOSIDES AND DOCUSATE SODIUM 2 TABLET: 50; 8.6 TABLET ORAL at 09:04

## 2023-02-05 RX ADMIN — NADOLOL 20 MG: 20 TABLET ORAL at 08:57

## 2023-02-05 ASSESSMENT — ACTIVITIES OF DAILY LIVING (ADL)
ADLS_ACUITY_SCORE: 74

## 2023-02-05 NOTE — DISCHARGE SUMMARY
Neuro: CHERRIE. Pt nonverbal. Alert. Able to move BUE.   Respiratory: Sats well on RA. Appears respiratorily comfortable.   Cardiac:  soft BP, MD notified.   GI/: mitrofanoff, straight cath provided by caregivers. Soft abdomen, incontinent of bowel x2.   Diet: Pureed diet w/ thin liquids, tolerates.   Pain/Nausea: appears comfortable.   Incisions/Drains: mitrofanoff  IV Access: PIV removed.   Labs: Potassium 3.0, replacement given .  Activity: Ax2 with all cares/transfers.   Plan: discharged home with mom.         Discharge paperwork was reviewed with patient's mom. Mom (caregiver) expressed understanding. A copy was given to caregiver. Pt discharging home. Mom at bedside and will transport. Discharge medications available in pharmacy and outside pharmacy, pt advised to pickup medications before they leave. All patient belongings were taken with patient.

## 2023-02-05 NOTE — PROVIDER NOTIFICATION
GOLD 12 notified for low BP and potassium level of 3.0 this morning.     PO potassium replacement ordered, MD aware of soft BPs and OK to discharge home today

## 2023-02-05 NOTE — DISCHARGE SUMMARY
[facility]  Hospitalist Discharge Summary      Date of Admission:  2/3/2023  Date of Discharge:  2/5/2023  Discharging Provider: Latoya Dillard MD  Discharge Service: Hospitalist Service, GOLD TEAM 12    Discharge Diagnoses   Constipation  UTI    Follow-ups Needed After Discharge   Follow-up Appointments     Adult [facility] Follow-up and recommended labs and tests      - Follow up with Primary care  - Start taking Miralax 2-3 times a day. Can hold if had a BM that day  - Continue Docusate as instructed before    Appointments on [place] and/or [facility] (with [facility] or [facility]   provider or service). Call [phone] if you haven't heard regarding   these appointments within 7 days of discharge.             Unresulted Labs Ordered in the Past 30 Days of this Admission     No orders found from 1/4/2023 to 2/4/2023.      These results will be followed up by N/a    Discharge Disposition   Discharged to home  Condition at discharge: Stable      Hospital Course   Robi Remy is a 37 year old female admitted on 2/3/2023. She has PMH of cerebral palsy, quadriplegia, non verbal, neurogenic bladder, prior Mitrofanoff procedure who presents with abdominal pain of 2 days duration associated with nausea and vomiting possibly due to UTI with constipation of large stool burden and admitted for IV antibiotic, hydration, constipation management and monitoring.  Finished 3 days of CTX. Urine Cx with Mixed messi only. CT abdomen done as a general evaluation shows large stool burden throughout small bowel with multiple dilated fluid and gas-filled areas. S/p 2 enemas and Bowel regimen. To discharge on Miralax 2-3 times a day along with docusate. Also with senna to take daily as needed       # UTI, recurrent   # Neurogenic bladder on Mitrofanoff   # Abdominal pain  # Nausea and vomiting  # Acute on chronic constipation  # Large stool burden  - s/p CTX 3 days. Also s/p Enema  and bowel regimen. To be sent home on bowel regimen above     # Cerebral palsy  # Quadriplegia  # Nonverbal  # Developmental delay/ Mental retardation  - Patient chronic condition is relatively stable with multiple caregivers involved: She does not have significant intercurrent illnesses or complications other than occasional UTI; caregiver indicates last hospitalization being far enough to remember;  - SLP consulted. Continue pureed diet   - Takes glycopyrrolate to control secretion, continue PTA glycopyrrolate  - Continue PTA vitamin supplement      # Hx of long QT syndrome type 2  - Per chart, The patient's father had a history of long QT syndrome and there has been a family history of sudden death in family members in their late teens and early 20s; Per care taker and chart, takes Nadolol on chronic basis prescribed by outpatient cardiologist. EKG ordered. Continue PTA nadolol       Consultations This Hospital Stay   SPEECH LANGUAGE PATH ADULT IP CONSULT    Code Status   Full Code    Time Spent on this Encounter   I, Latoya Dillard MD, personally saw the patient today and spent greater than 30 minutes discharging this patient.       Latoya Dillard MD  [facility] [address] [address]  Phone: [phone]  ______________________________________________________________________    Physical Exam     BP 93/53 (BP Location: Left arm)   Pulse 83   Temp (!) 96.7  F (35.9  C) (Axillary)   Resp 16   Ht 1.524 m (5')   Wt 43.1 kg (95 lb)   LMP  (Approximate)   SpO2 95%   BMI 18.55 kg/m      Gen: NAD, alert, non verbal at baseline  HEENT: EOMI, no conjunctival icterus  Resp: CTAB, no crackles or wheezes, no increased WOB  Cardiac: RRR  GI:  non-distended       Primary Care Physician   Bill Beltrán    Discharge Orders      Reason for your hospital stay    Severe constipation and possible urinary tract infection     Activity    Your activity upon discharge: As tolerated      Adult [email or web address] Follow-up and recommended labs and tests    - Follow up with Primary care  - Start taking Miralax 2-3 times a day. Can hold if had a BM that day  - Continue Docusate as instructed before    Appointments on [place] and/or [facility] (with [facility] [[email or web address]] or [facility] provider or service). Call [phone] if you haven't heard regarding these appointments within 7 days of discharge.     Diet    Follow this diet upon discharge:Per previous diet       Significant Results and Procedures   Most Recent 3 CBC's:  Recent Labs   Lab Test 02/05/23  0656 02/04/23  0538 02/03/23  0211   WBC 10.1 9.1 12.6*   HGB 10.6* 12.6 15.5   MCV 86 86 83    260 356     Most Recent 3 BMP's:  Recent Labs   Lab Test 02/05/23  0656 02/04/23  0538 02/03/23  0211    142 137   POTASSIUM 3.0* 3.8 4.3   CHLORIDE 116* 110* 98   CO2 19* 22 26   BUN 10.7 20.2* 40.5*   CR 0.40* 0.45* 0.54   ANIONGAP 9 10 13   BRYAN 7.6* 8.4* 10.5*   GLC 86 118* 154*     Most Recent 2 LFT's:  Recent Labs   Lab Test 02/05/23  0656 02/04/23  0538   AST 19 30   ALT 5* 12   ALKPHOS 46 56   BILITOTAL 0.3 0.4     Most Recent 3 INR's:No lab results found.    Discharge Medications   Discharge Medication List as of 2/5/2023  9:37 AM      START taking these medications    Details   ondansetron (ZOFRAN) 4 MG tablet Take 1 tablet (4 mg) by mouth every 8 hours as needed, Disp-15 tablet, R-0, E-Prescribe      senna-docusate (SENOKOT-S/PERICOLACE) 8.6-50 MG tablet Take 1 tablet by mouth daily as needed for constipation (constipation), Disp-30 tablet, R-1, E-Prescribe         CONTINUE these medications which have CHANGED    Details   polyethylene glycol (MIRALAX) 17 GM/Dose powder Take 17 g by mouth 2 times daily, Disp-510 g, R-0, E-Prescribe         CONTINUE these medications which have NOT CHANGED    Details   docusate sodium (COLACE) 100 MG capsule Take 1 capsule (100 mg) by mouth 2 times daily, Disp-180 capsule, R-1, Fax      glycopyrrolate (GLYCATE) 2 MG  tablet Take 4 mg by mouth 3 times daily, Historical      ketoconazole (NIZORAL) 2 % external shampoo Apply topically twice a weekHistorical      Multiple Vitamins-Iron (MULTIVITAMIN/IRON PO) Take 1 tablet by mouth daily, Historical      nadolol (CORGARD) 20 MG tablet Take 1 tablet by mouth every morning, Historical           Allergies   Allergies   Allergen Reactions     Tape [Adhesive Tape]      Skin blistering  Tape had been on for one month without changing. Unsure what tape

## 2023-02-05 NOTE — PLAN OF CARE
Temp: (!) 95.5  F (35.3  C) Temp src: Axillary BP: 96/55 Pulse: 79   Resp: 16 SpO2: 92 % O2 Device: None (Room air)       Time of care: 1900-0730  Reason for admission: UTI, N/V and constipation     NEURO: CHERRIE, pt is nonverbal. Alert and awakens appropriately. Can move upper extremities,   RESPIRATORY: sats >90% on RA, LSC.   CARDIAC: vss, soft BP  GI/: mitroffanoff, caregiver straight catheterizing. Nani urine w/ mucus. soft abdomen, 2 BM this shift.   DIET: pureed diet w/ thin liquids. meds whole in puree.   PAIN/NAUSEA: appears comfortable, no emesis.   INCISION/DRAINS/SKIN: mitroffanoff.   IV ACCESS: PIV w/ NS @125ml/hr   ACTIVITY: full assistance, pt's caregiver at bedside  LAB: reviewed  CHANGES: no acute major changes, pt slept comfortably  PLAN: continue w/ POC.

## 2023-09-07 ENCOUNTER — DOCUMENTATION ONLY (OUTPATIENT)
Dept: OTHER | Facility: CLINIC | Age: 38
End: 2023-09-07
Payer: MEDICARE

## 2024-02-09 ENCOUNTER — TELEPHONE (OUTPATIENT)
Dept: OBGYN | Facility: CLINIC | Age: 39
End: 2024-02-09
Payer: MEDICARE

## 2024-02-09 NOTE — TELEPHONE ENCOUNTER
Returning a call to Aileen who is Robi's mom to clarify what she is needing from our clinic. Aileen stated Robi is having a dental procedure in the OR on 3/13/24 and is wanting us to go in to do a pap smear and possibly replace Robi's Mirena IUD. Writer didn't see any dental procedures scheduled and instructed patients mom to call dental team to reach out so we can coordinate this case. Aileen expressed understanding and will call back with any concerns or questions.     Patricia YUSUF   Surgery Scheduler

## 2024-02-09 NOTE — TELEPHONE ENCOUNTER
FUTURE VISIT INFORMATION      SURGERY INFORMATION:  Dental procedure with Jannie Ferguson at [facility] on 2024     RECORDS REQUESTED FROM:       Primary Care Provider: [facility]    Most recent EKG+ Tracin/3/23

## 2024-02-16 ENCOUNTER — PREP FOR PROCEDURE (OUTPATIENT)
Dept: OBGYN | Facility: CLINIC | Age: 39
End: 2024-02-16
Payer: MEDICARE

## 2024-02-16 DIAGNOSIS — Z12.4 SCREENING FOR MALIGNANT NEOPLASM OF CERVIX: Primary | ICD-10-CM

## 2024-02-27 ENCOUNTER — VIRTUAL VISIT (OUTPATIENT)
Dept: SURGERY | Facility: CLINIC | Age: 39
End: 2024-02-27
Payer: MEDICARE

## 2024-02-27 ENCOUNTER — PRE VISIT (OUTPATIENT)
Dept: SURGERY | Facility: CLINIC | Age: 39
End: 2024-02-27

## 2024-02-27 ENCOUNTER — ANESTHESIA EVENT (OUTPATIENT)
Dept: SURGERY | Facility: CLINIC | Age: 39
End: 2024-02-27
Payer: MEDICARE

## 2024-02-27 DIAGNOSIS — Z01.818 PREOP EXAMINATION: Primary | ICD-10-CM

## 2024-02-27 DIAGNOSIS — K02.9 DENTAL CARIES: ICD-10-CM

## 2024-02-27 DIAGNOSIS — G80.0 SPASTIC QUADRIPLEGIC CEREBRAL PALSY (H): ICD-10-CM

## 2024-02-27 PROCEDURE — 99203 OFFICE O/P NEW LOW 30 MIN: CPT | Mod: 95 | Performed by: NURSE PRACTITIONER

## 2024-02-27 ASSESSMENT — ENCOUNTER SYMPTOMS: DYSRHYTHMIAS: 1

## 2024-02-27 NOTE — PATIENT INSTRUCTIONS
Preparing for Your Surgery      Name:  Robi Remy   MRN:  [id number]   :  1985   Today's Date:  2024       Arriving for surgery:  Surgery date:  3-13-24  Arrival time:  5:30 am    Please come to:     [facility] [address]  (Address takes you to the [facility].)  [address] [place] [address]  [id number]  The Green Ramp for patients and visitors is located beneath the [facility]. The parking facility entrance is at the intersection of [address] [place] [address] and [address]. Patients and visitors who self-park will receive the reduced hospital parking rate (no ticket validation needed).  [employer] parking, located at the [facility] main entrance on [address] [place] [address], is available Monday - Friday from 7 am to 3:30 pm.  Discounted parking pass options can be purchased from  attendants during business hours.  -Check in at the security desk in the [facility] ([facility]) Lobby. They will direct you to the correct elevators.  -Proceed to the 3rd floor, check in at the Adult Surgery Waiting Lounge. [phone]  If you are in need of directions, a wheelchair or escort please stop at the Information Desk in the lobby.  Inform the information person that you are here for surgery; a wheelchair and escort to [address] will be provided.   An escort to the Adult Surgery Waiting Lounge will be provided.    What can I eat or drink?  -  You may eat and drink normally up to 8 hours prior to arrival time. (Until 9:30 pm 3-12-24)  -  You may have clear liquids until 2 hours prior to arrival time. (Until 3:30 am)    Examples of clear liquids:  Water  Clear broth  Juices (apple, white grape, white cranberry  and cider) without pulp  Noncarbonated, powder based beverages  (lemonade and Harsha-Aid)  Sodas (Sprite, 7-Up, ginger ale and seltzer)  Coffee or tea (without milk or  cream)  Gatorade    -  No Alcohol or cannabis products for at least 24 hours before surgery.     Which medicines can I take?  Hold Aspirin for 7 days before surgery.   Hold Multivitamins for 7 days before surgery. Take the last Multivitamin on 3-5-24, and then hold until surgery.  Hold Supplements for 7 days before surgery.  Hold Ibuprofen (Advil, Motrin) for 1 day before surgery--unless otherwise directed by surgeon.  Hold Naproxen (Aleve) for 4 days before surgery.  Acetaminophen (Tylenol) is okay to take if needed.    -  DO NOT take these medications the day of surgery:  Docusate Sodium (Colace)  Polyethylene Glycol (Miralax)  Senna-Docusate (Senokot S)    -  PLEASE TAKE these medications the day of surgery:  Glycopyrrolate (Glycate)  Nadolol (Corgard)  Acetaminophen (Tylenol) if needed    How do I prepare myself?  - Please take 2 showers (one the night prior to surgery and one the morning of surgery) using Scrubcare or Hibiclens soap.    You may use your own shampoo and conditioner. No other hair products.     Use this soap only from the neck to your toes.     Leave the soap on your skin for one minute--then rinse thoroughly.     You may also use Dial soap for showering.  - Please remove all jewelry and body piercings.  - No lotions, deodorants or fragrance.  - No makeup or fingernail polish.   - Bring your ID and insurance card.    -If you use a CPAP machine, please bring the CPAP machine, tubing, and mask to hospital.    -If you have a Deep Brain Stimulator, Spinal Cord Stimulator, or any Neuro Stimulator device---you must bring the remote control to the hospital.      ALL PATIENTS GOING HOME THE SAME DAY OF SURGERY ARE REQUIRED TO HAVE A RESPONSIBLE ADULT TO DRIVE AND BE IN ATTENDANCE WITH THEM FOR 24 HOURS FOLLOWING SURGERY.    Covid testing policy as of 12/06/2022  Your surgeon will notify and schedule you for a COVID test if one is needed before surgery--please direct any questions or COVID symptoms to  your surgeon      Questions or Concerns:    - For any questions regarding the day of surgery or your hospital stay, please contact the Pre Admission Nursing Office at [phone].       - If you have health changes between today and your surgery, please call your surgeon.       - For questions after surgery, please call your surgeons office.           Current Visitor Guidelines    You may have 2 visitors in the pre op area.    Visiting hours: 8 a.m. to 8:30 p.m.    Patients confirmed or suspected to have symptoms of COVID 19 or flu:     No visitors allowed for adult patients.   Children (under age 18) can have 1 named visitor.     People who are sick or showing symptoms of COVID 19 or flu:    Are not allowed to visit patients--we can only make exceptions in special situations.       Please follow these guidelines for your visit:          Please maintain social distance          Masking is optional--however at times you may be asked to wear a mask for the safety of yourself and others     Clean your hands with alcohol hand . Do this when you arrive at and leave the building and patient room,    And again after you touch your mask or anything in the room.     Go directly to and from the room you are visiting.     Stay in the patient s room during your visit. Limit going to other places in the hospital as much as possible     Leave bags and jackets at home or in the car.     For everyone s health, please don t come and go during your visit. That includes for smoking   during your visit.

## 2024-02-27 NOTE — H&P
Pre-Operative H & P     CC:  Preoperative exam to assess for increased cardiopulmonary risk while undergoing surgery and anesthesia.    Date of Encounter: 2/27/2024  Primary Care Physician:  Bill Beltrán     Reason for visit:   Encounter Diagnoses   Name Primary?    Preop examination Yes    Spastic quadriplegic cerebral palsy (H)     Dental caries        HPI  Robi Remy is a 38 year old female who presents for pre-operative H & P in preparation for  Procedure Information       Case: [id number] Date/Time: 03/13/24 0730    Procedures:       Bilateral dental exam, Dental radiograph, dental restorations, pulpotomy, root canal therapy, frenectomy, gingivectomy, Alveoloplasty, periodontal therapy, fluoride, varnish in the mouth, dental extractions (Bilateral: Mouth)      PAP Smear (Vagina)      Exam Under Anesthesia Breast (Breast)    Anesthesia type: General    Diagnosis:       Dental caries [K02.9]      Dental infection [K04.7]    Pre-op diagnosis:       Dental caries [K02.9]      Dental infection [K04.7]    [facility]    Providers: Jannie Ferguson DDS; Matilda Alcantar MD            Robi Remy is a 38 year old female with long Q-T syndrome, anemia, cerebral palsy, developmental delay, spastic quadriplegia and neurogenic bladder that has dental carries and is due for a pap smear.  The above listed procedure has been scheduled to manage both at the same time for one anesthesia occurrence.       History is obtained from the patient's mother Aileen and chart review    Hx of abnormal bleeding or anti-platelet use: none    Menstrual history: No LMP recorded. (Menstrual status: IUD).:      Past Medical History  Past Medical History:   Diagnosis Date    Cerebral palsy (H)     Developmental delay     Long Q-T syndrome     takes nadolol    Neurogenic bladder     Nonverbal     Self-catheterizes urinary bladder     Parents Cath patient    Spastic quadriplegia (H)        Past Surgical  History  Past Surgical History:   Procedure Laterality Date    BACK SURGERY  2002    spine fusion    EXAM UNDER ANESTHESIA DENTAL  2010    EXAM UNDER ANESTHESIA PELVIC N/A 1/4/2018    Procedure: EXAM UNDER ANESTHESIA PELVIC;  Pap Smear, Breast exam, Mirena IUD removal and replacement.  Dental Exam, Radiographs, Dental Restorations, Periodontal Therapy, Dental Extractions, Biopsies   ;  Surgeon: Briseida Iyer MD;  [facility]    EXAM UNDER ANESTHESIA, RESTORATIONS, EXTRACTION(S) DENTAL, COMBINED N/A 1/4/2018    Procedure: COMBINED EXAM UNDER ANESTHESIA, RESTORATIONS, EXTRACTION(S) DENTAL;  Dental exam, x- rays, periodontal therapy and flouride varnish;  Surgeon: Ara Urbano DDS;  [facility]    LAPAROSCOPIC MITROFANOFF PROCEDURE (APPENDIX CONDUIT) N/A 9/4/2014    Procedure: LAPAROSCOPIC MITROFANOFF PROCEDURE (APPENDIX CONDUIT);  Surgeon: Naren Bustos MD;  [facility]    LAPAROSCOPIC REPAIR BLADDER N/A 9/4/2014    Procedure: LAPAROSCOPIC REPAIR BLADDER;  Surgeon: Naren Bustos MD;  [facility]    REPLACE INTRAUTERINE DEVICE N/A 1/4/2018    Procedure: REPLACE INTRAUTERINE DEVICE;  Mirena IUD removal and replacement;  Surgeon: Briseida Iyer MD;  [facility]       Prior to Admission Medications  Current Outpatient Medications   Medication Sig Dispense Refill    docusate sodium (COLACE) 100 MG capsule Take 1 capsule (100 mg) by mouth 2 times daily 180 capsule 1    glycopyrrolate (GLYCATE) 2 MG tablet Take 4 mg by mouth 3 times daily      Multiple Vitamins-Iron (MULTIVITAMIN/IRON PO) Take 1 tablet by mouth every morning      nadolol (CORGARD) 20 MG tablet Take 1 tablet by mouth every morning      polyethylene glycol (MIRALAX) 17 GM/Dose powder Take 17 g by mouth 2 times daily 510 g 0    senna-docusate (SENOKOT-S/PERICOLACE) 8.6-50 MG tablet Take 1 tablet by mouth daily as needed for constipation (constipation) 30 tablet 1    ketoconazole (NIZORAL) 2 %  external shampoo Apply topically twice a week         Allergies  Allergies   Allergen Reactions    Tape [Adhesive Tape]      Skin blistering  Tape had been on for one month without changing. Unsure what tape       Social History  Social History     Socioeconomic History    Marital status: Single     Spouse name: Not on file    Number of children: Not on file    Years of education: Not on file    Highest education level: Not on file   Occupational History    Not on file   Tobacco Use    Smoking status: Never     Passive exposure: Never    Smokeless tobacco: Never   Substance and Sexual Activity    Alcohol use: No    Drug use: No    Sexual activity: Not on file   Other Topics Concern    Not on file   Social History Narrative    Not on file     Social Determinants of Health     Financial Resource Strain: Not on file   Food Insecurity: Not on file   Transportation Needs: Not on file   Physical Activity: Not on file   Stress: Not on file   Social Connections: Not on file   Interpersonal Safety: Not on file   Housing Stability: Not on file       Family History  Family History   Problem Relation Age of Onset    Heart Disease Father         long QT syndrome     Anesthesia Reaction No family hx of     Thrombosis No family hx of        Review of Systems  The complete review of systems is negative other than noted in the HPI or here.   Anesthesia Evaluation   Pt has had prior anesthetic.     No history of anesthetic complications       ROS/MED HX  ENT/Pulmonary:  - neg pulmonary ROS     Neurologic: Comment: Cerebral palsy  Spastic quadriplegia      (+)                         Developmental delay,       Cardiovascular:     (+)  - -   -  - -                        dysrhythmias, Long QT,        Previous cardiac testing   Echo: Date: Results:    Stress Test:  Date: Results:    ECG Reviewed:  Date: 2/3/23 Results:  Sinus rhythm with short OK   Prolonged QT   Abnormal ECG       Cath:  Date: Results:      METS/Exercise Tolerance: 1 -  Eating, dressing    Hematologic:     (+)      anemia,          Musculoskeletal: Comment: S/p spinal fusion many years ago for scoliosis      GI/Hepatic:  - neg GI/hepatic ROS     Renal/Genitourinary: Comment: Due for pap smear    Neurogenic bladder  Mitrofanoff - cath 4-5x per day    Mirena in place       Endo:  - neg endo ROS     Psychiatric/Substance Use:       Infectious Disease:  - neg infectious disease ROS     Malignancy:  - neg malignancy ROS     Other: Comment: Dental caries, dental infection     (+)  , ,, other significant disability Wheelchair bound (stand and pivot transfers)         Virtual visit -  No vitals were obtained    Physical Exam  Constitutional: Awake, alert, no apparent distress, and appears stated age.  Non-verbal  HENT: Normocephalic  Respiratory: non labored breathing   Neurologic: CHERRIE orientation  Neuropsychiatric: Calm      Prior Labs/Diagnostic Studies   All labs and imaging personally reviewed     EKG/ stress test - if available please see in ROS above     CBC with platelets  Order: [id number]  Collected 2/5/2023  6:56 AM    0 Result Notes      Component  Ref Range & Units 1 yr ago   WBC Count  4.0 - 11.0 10e3/uL 10.1   RBC Count  3.80 - 5.20 10e6/uL 3.92   Hemoglobin  11.7 - 15.7 g/dL 10.6 Low    Hematocrit  35.0 - 47.0 % 33.8 Low    MCV  78 - 100 fL 86   MCH  26.5 - 33.0 pg 27.0   MCHC  31.5 - 36.5 g/dL 31.4 Low    RDW  10.0 - 15.0 % 13.7   Platelet Count  150 - 450 10e3/uL 226        View Full Report        Specimen Collected: 02/05/23  6:56 AM Last Resulted: 02/05/23  8:07 AM             Result Care Coordination      Patient Communication     Add Comments   Not seen Back to Top            Contains abnormal data Basic Metabolic Panel  Order: [id number]   suggestion  Information displayed in this report may not trend or trigger automated decision support.     Component  Ref Range & Units 7 mo ago   Sodium  136 - 145 mmol/L 138   Potassium  3.5 - 5.1 mmol/L 4.5   Chloride  98 - 109  mmol/L 105   CO2  20 - 29 mmol/L 26   Anion Gap  7 - 16 mmol/L 7   Calcium  8.4 - 10.4 mg/dL 9.8   BUN  7 - 26 mg/dL 17   Creatinine  0.55 - 1.02 mg/dL 0.43 Low    Glucose  70 - 100 mg/dL 103 High    Comment: The given reference range is for the fasting state. Non-fasting reference range for glucose is 70 - 180 mg/dL.   Hours Fasting 3   GFR, Estimated  >60 mL/min/1.73m2 >60     Specimen Collected: 07/17/23 12:05 PM    Performed by: [facility] Last Resulted: 07/17/23  1:38 PM   Received From: [facility]          The patient's records and results personally reviewed by this provider.     Outside records reviewed from: Care Everywhere      Assessment    Robi Remy is a 38 year old female seen as a PAC referral for risk assessment and optimization for anesthesia.    Plan/Recommendations  Pt will be optimized for the proposed procedure.  See below for details on the assessment, risk, and preoperative recommendations    NEUROLOGY  - No history of TIA, CVA or seizure    - cerebral palsy with spastic quadriplegia.  Wheelchair bound, but mother reports that the patient is able to stand to pivot transfer with assist.  Non-verbal.    -Post Op delirium risk factors:  No risk identified    ENT  - No current airway concerns.  Will need to be reassessed day of surgery.  Mallampati: Unable to assess  TM: Unable to assess    CARDIAC  - No history of CAD, Hypertension, and Afib  - on nadolol for prolonged QT.    - METS (Metabolic Equivalents)  Patient CANNOT perform 4 METS exercise without symptoms            Total Score: 1    Functional Capacity: Unable to complete 4 METS      RCRI-Very low risk: Class 1 0.4% complication rate            Total Score: 0        PULMONARY    DANIEL Low Risk            Total Score: 0      - Denies asthma or inhaler use  - Tobacco History    History   Smoking Status    Never   Smokeless Tobacco    Never       GI  - denies GERD  PONV High Risk  Total Score: 3           1 AN PONV: Pt is  Female    1 AN PONV: Patient is not a current smoker    1 AN PONV: Intended Post Op Opioids        /RENAL  - mitorfanoff for neurogenic bladder.  Cath 4-5x per day    ENDOCRINE    - BMI: Estimated body mass index is 18.55 kg/m  as calculated from the following:    Height as of 2/2/23: 1.524 m (5').    Weight as of 2/2/23: 43.1 kg (95 lb).  Healthy Weight (BMI 18.5-24.9)  - No history of Diabetes Mellitus    HEME  VTE Low Risk 0.26%            Total Score: 0      - No history of abnormal bleeding or antiplatelet use.  - Chronic  anemia  Recommend perioperative use of blood conservation techniques intraoperatively and close monitoring for postoperative bleeding.  - will  order hgb recheck for DOS    MSK  - wheelchair bound as noted above.        Different anesthesia methods/types have been discussed with the patient, but they are aware that the final plan will be decided by the assigned anesthesia provider on the date of service.      The patient is optimized for their procedure. AVS with information on surgery time/arrival time, meds and NPO status given by nursing staff. No further diagnostic testing indicated.    Please refer to the physical examination documented by the anesthesiologist in the anesthesia record on the day of surgery.    Video-Visit Details    Type of service:  Video Visit    Provider received verbal consent for a Video Visit from the patient? Yes   Video Start Time:  1358  Video End Time: 1409    Originating Location (pt. Location): Home    Distant Location (provider location):  Off-site  Mode of Communication:  Video Conference via [employer]    On the day of service:     Prep time: 8 minutes  Visit time: 11 minutes  Documentation time: 11 minutes  ------------------------------------------  Total time: 30 minutes      LINDA Smith CNP  Preoperative Assessment Center  [facility]  Cl[facility]  Phone: [phone]  Fax: [phone]

## 2024-02-27 NOTE — PROGRESS NOTES
Robi is a 38 year old who is being evaluated via a billable video visit.      How would you like to obtain your AVS? Mail a copy            Subjective   Robi is a 38 year old, presenting for the following health issues:  Pre-Op Exam        SYED Soler LPN

## 2024-02-29 ENCOUNTER — NURSE TRIAGE (OUTPATIENT)
Dept: NURSING | Facility: CLINIC | Age: 39
End: 2024-02-29
Payer: MEDICARE

## 2024-02-29 ENCOUNTER — APPOINTMENT (OUTPATIENT)
Dept: GENERAL RADIOLOGY | Facility: CLINIC | Age: 39
DRG: 689 | End: 2024-02-29
Attending: EMERGENCY MEDICINE
Payer: MEDICARE

## 2024-02-29 ENCOUNTER — HOSPITAL ENCOUNTER (INPATIENT)
Facility: CLINIC | Age: 39
LOS: 3 days | Discharge: HOME | DRG: 689 | End: 2024-03-04
Attending: EMERGENCY MEDICINE | Admitting: INTERNAL MEDICINE
Payer: MEDICARE

## 2024-02-29 DIAGNOSIS — N15.1 PERINEPHRIC ABSCESS: ICD-10-CM

## 2024-02-29 DIAGNOSIS — R63.0 ANOREXIA: Primary | ICD-10-CM

## 2024-02-29 LAB
ALBUMIN SERPL BCG-MCNC: 3.9 G/DL (ref 3.5–5.2)
ALBUMIN UR-MCNC: 30 MG/DL
ALP SERPL-CCNC: 84 U/L (ref 40–150)
ALT SERPL W P-5'-P-CCNC: 11 U/L (ref 0–50)
ANION GAP SERPL CALCULATED.3IONS-SCNC: 9 MMOL/L (ref 7–15)
APPEARANCE UR: ABNORMAL
AST SERPL W P-5'-P-CCNC: 14 U/L (ref 0–45)
BACTERIA #/AREA URNS HPF: ABNORMAL /HPF
BASOPHILS # BLD AUTO: 0.1 10E3/UL (ref 0–0.2)
BASOPHILS NFR BLD AUTO: 0 %
BILIRUB SERPL-MCNC: 0.3 MG/DL
BILIRUB UR QL STRIP: NEGATIVE
BUN SERPL-MCNC: 17.8 MG/DL (ref 6–20)
CALCIUM SERPL-MCNC: 9.9 MG/DL (ref 8.6–10)
CHLORIDE SERPL-SCNC: 98 MMOL/L (ref 98–107)
COLOR UR AUTO: YELLOW
CREAT SERPL-MCNC: 0.43 MG/DL (ref 0.51–0.95)
DEPRECATED HCO3 PLAS-SCNC: 28 MMOL/L (ref 22–29)
EGFRCR SERPLBLD CKD-EPI 2021: >90 ML/MIN/1.73M2
EOSINOPHIL # BLD AUTO: 0.1 10E3/UL (ref 0–0.7)
EOSINOPHIL NFR BLD AUTO: 1 %
ERYTHROCYTE [DISTWIDTH] IN BLOOD BY AUTOMATED COUNT: 12.9 % (ref 10–15)
FLUAV RNA SPEC QL NAA+PROBE: NEGATIVE
FLUBV RNA RESP QL NAA+PROBE: NEGATIVE
GLUCOSE SERPL-MCNC: 102 MG/DL (ref 70–99)
GLUCOSE UR STRIP-MCNC: NEGATIVE MG/DL
HCG UR QL: NEGATIVE
HCT VFR BLD AUTO: 37.9 % (ref 35–47)
HGB BLD-MCNC: 12.1 G/DL (ref 11.7–15.7)
HGB UR QL STRIP: ABNORMAL
IMM GRANULOCYTES # BLD: 0.1 10E3/UL
IMM GRANULOCYTES NFR BLD: 1 %
KETONES UR STRIP-MCNC: NEGATIVE MG/DL
LEUKOCYTE ESTERASE UR QL STRIP: ABNORMAL
LYMPHOCYTES # BLD AUTO: 2.4 10E3/UL (ref 0.8–5.3)
LYMPHOCYTES NFR BLD AUTO: 14 %
MAGNESIUM SERPL-MCNC: 2.1 MG/DL (ref 1.7–2.3)
MCH RBC QN AUTO: 27.1 PG (ref 26.5–33)
MCHC RBC AUTO-ENTMCNC: 31.9 G/DL (ref 31.5–36.5)
MCV RBC AUTO: 85 FL (ref 78–100)
MONOCYTES # BLD AUTO: 1.2 10E3/UL (ref 0–1.3)
MONOCYTES NFR BLD AUTO: 7 %
MUCOUS THREADS #/AREA URNS LPF: PRESENT /LPF
NEUTROPHILS # BLD AUTO: 13.4 10E3/UL (ref 1.6–8.3)
NEUTROPHILS NFR BLD AUTO: 77 %
NITRATE UR QL: POSITIVE
NRBC # BLD AUTO: 0 10E3/UL
NRBC BLD AUTO-RTO: 0 /100
PH UR STRIP: 6 [PH] (ref 5–7)
PLATELET # BLD AUTO: 376 10E3/UL (ref 150–450)
POTASSIUM SERPL-SCNC: 4.6 MMOL/L (ref 3.4–5.3)
PROT SERPL-MCNC: 8.3 G/DL (ref 6.4–8.3)
RBC # BLD AUTO: 4.46 10E6/UL (ref 3.8–5.2)
RBC URINE: 2 /HPF
RSV RNA SPEC NAA+PROBE: NEGATIVE
SARS-COV-2 RNA RESP QL NAA+PROBE: NEGATIVE
SODIUM SERPL-SCNC: 135 MMOL/L (ref 135–145)
SP GR UR STRIP: 1.02 (ref 1–1.03)
SQUAMOUS EPITHELIAL: 4 /HPF
TRANSITIONAL EPI: 1 /HPF
UROBILINOGEN UR STRIP-MCNC: NORMAL MG/DL
WBC # BLD AUTO: 17.3 10E3/UL (ref 4–11)
WBC URINE: 59 /HPF

## 2024-02-29 PROCEDURE — 99285 EMERGENCY DEPT VISIT HI MDM: CPT | Mod: 25 | Performed by: EMERGENCY MEDICINE

## 2024-02-29 PROCEDURE — 83735 ASSAY OF MAGNESIUM: CPT | Performed by: EMERGENCY MEDICINE

## 2024-02-29 PROCEDURE — 74019 RADEX ABDOMEN 2 VIEWS: CPT | Mod: 26 | Performed by: RADIOLOGY

## 2024-02-29 PROCEDURE — 81025 URINE PREGNANCY TEST: CPT | Performed by: EMERGENCY MEDICINE

## 2024-02-29 PROCEDURE — 74019 RADEX ABDOMEN 2 VIEWS: CPT

## 2024-02-29 PROCEDURE — 99285 EMERGENCY DEPT VISIT HI MDM: CPT | Performed by: EMERGENCY MEDICINE

## 2024-02-29 PROCEDURE — 87086 URINE CULTURE/COLONY COUNT: CPT | Performed by: EMERGENCY MEDICINE

## 2024-02-29 PROCEDURE — 36415 COLL VENOUS BLD VENIPUNCTURE: CPT | Performed by: EMERGENCY MEDICINE

## 2024-02-29 PROCEDURE — 85048 AUTOMATED LEUKOCYTE COUNT: CPT | Performed by: EMERGENCY MEDICINE

## 2024-02-29 PROCEDURE — 81001 URINALYSIS AUTO W/SCOPE: CPT | Performed by: EMERGENCY MEDICINE

## 2024-02-29 PROCEDURE — 80053 COMPREHEN METABOLIC PANEL: CPT | Performed by: EMERGENCY MEDICINE

## 2024-02-29 PROCEDURE — 87637 SARSCOV2&INF A&B&RSV AMP PRB: CPT | Performed by: EMERGENCY MEDICINE

## 2024-02-29 ASSESSMENT — COLUMBIA-SUICIDE SEVERITY RATING SCALE - C-SSRS: IS THE PATIENT NOT ABLE TO COMPLETE C-SSRS: UNABLE TO VERBALIZE

## 2024-02-29 ASSESSMENT — ACTIVITIES OF DAILY LIVING (ADL)
ADLS_ACUITY_SCORE: 39
ADLS_ACUITY_SCORE: 39

## 2024-02-29 NOTE — TELEPHONE ENCOUNTER
Received call from Neisha Rojo. She states she is patient's caregiver. No consent to communicate on file. Asked if one of the parents was available to provide consent and she stated no. She states patient rotates between the three of the them and she is at her house right now. Explained that triager able to take information from her but unable to share any information with her d/t no consent. She states patient's PCP is with [facility] but she sees a Urologist at the . States patient has Cerebral Palsy and hasn't eaten normally for about 10 days. States she eats blended foods, and that she had a bladder infection and fecal impaction about a year ago. She is concerned that this might be happening again and wants to know if she should bring her in. Advised her to call PCP's nurse line for triage if they have a consent to communicate. Also advised that if she feels that patient needs to be seen, she can always bring her in. Again explained that triager unable to share any information with her d/t no consent on file. She had no further questions and will try to call nurse line for [facility].     Reason for Disposition   General information question, no triage required and triager able to answer question    Protocols used: Information Only Call - No Triage-A-OH

## 2024-03-01 ENCOUNTER — APPOINTMENT (OUTPATIENT)
Dept: CT IMAGING | Facility: CLINIC | Age: 39
DRG: 689 | End: 2024-03-01
Attending: EMERGENCY MEDICINE
Payer: MEDICARE

## 2024-03-01 ENCOUNTER — APPOINTMENT (OUTPATIENT)
Dept: INTERVENTIONAL RADIOLOGY/VASCULAR | Facility: CLINIC | Age: 39
DRG: 689 | End: 2024-03-01
Attending: PHYSICIAN ASSISTANT
Payer: MEDICARE

## 2024-03-01 PROBLEM — N15.1 PERINEPHRIC ABSCESS: Status: ACTIVE | Noted: 2024-03-01

## 2024-03-01 LAB
ANION GAP SERPL CALCULATED.3IONS-SCNC: 12 MMOL/L (ref 7–15)
ATRIAL RATE - MUSE: 84 BPM
BACTERIA UR CULT: ABNORMAL
BASOPHILS # BLD AUTO: 0.1 10E3/UL (ref 0–0.2)
BASOPHILS NFR BLD AUTO: 0 %
BUN SERPL-MCNC: 15.5 MG/DL (ref 6–20)
CALCIUM SERPL-MCNC: 9.7 MG/DL (ref 8.6–10)
CHLORIDE SERPL-SCNC: 102 MMOL/L (ref 98–107)
CREAT SERPL-MCNC: 0.38 MG/DL (ref 0.51–0.95)
DEPRECATED HCO3 PLAS-SCNC: 23 MMOL/L (ref 22–29)
DIASTOLIC BLOOD PRESSURE - MUSE: NORMAL MMHG
EGFRCR SERPLBLD CKD-EPI 2021: >90 ML/MIN/1.73M2
EOSINOPHIL # BLD AUTO: 0.1 10E3/UL (ref 0–0.7)
EOSINOPHIL NFR BLD AUTO: 1 %
ERYTHROCYTE [DISTWIDTH] IN BLOOD BY AUTOMATED COUNT: 12.9 % (ref 10–15)
GLUCOSE SERPL-MCNC: 95 MG/DL (ref 70–99)
HCT VFR BLD AUTO: 35 % (ref 35–47)
HGB BLD-MCNC: 11.2 G/DL (ref 11.7–15.7)
IMM GRANULOCYTES # BLD: 0.1 10E3/UL
IMM GRANULOCYTES NFR BLD: 1 %
INR BLD: 1.4 (ref 2–3)
INR PPP: 1.37 (ref 0.85–1.15)
INTERPRETATION ECG - MUSE: NORMAL
LYMPHOCYTES # BLD AUTO: 2.7 10E3/UL (ref 0.8–5.3)
LYMPHOCYTES NFR BLD AUTO: 18 %
MCH RBC QN AUTO: 26.7 PG (ref 26.5–33)
MCHC RBC AUTO-ENTMCNC: 32 G/DL (ref 31.5–36.5)
MCV RBC AUTO: 84 FL (ref 78–100)
MONOCYTES # BLD AUTO: 1.2 10E3/UL (ref 0–1.3)
MONOCYTES NFR BLD AUTO: 8 %
NEUTROPHILS # BLD AUTO: 11 10E3/UL (ref 1.6–8.3)
NEUTROPHILS NFR BLD AUTO: 72 %
NRBC # BLD AUTO: 0 10E3/UL
NRBC BLD AUTO-RTO: 0 /100
P AXIS - MUSE: 71 DEGREES
PLATELET # BLD AUTO: 378 10E3/UL (ref 150–450)
POTASSIUM SERPL-SCNC: 4.6 MMOL/L (ref 3.4–5.3)
PR INTERVAL - MUSE: 142 MS
QRS DURATION - MUSE: 72 MS
QT - MUSE: 422 MS
QTC - MUSE: 498 MS
R AXIS - MUSE: 66 DEGREES
RBC # BLD AUTO: 4.19 10E6/UL (ref 3.8–5.2)
SODIUM SERPL-SCNC: 137 MMOL/L (ref 135–145)
SYSTOLIC BLOOD PRESSURE - MUSE: NORMAL MMHG
T AXIS - MUSE: 65 DEGREES
VENTRICULAR RATE- MUSE: 84 BPM
WBC # BLD AUTO: 15 10E3/UL (ref 4–11)

## 2024-03-01 PROCEDURE — 74177 CT ABD & PELVIS W/CONTRAST: CPT | Mod: 26 | Performed by: RADIOLOGY

## 2024-03-01 PROCEDURE — 87070 CULTURE OTHR SPECIMN AEROBIC: CPT | Performed by: PHYSICIAN ASSISTANT

## 2024-03-01 PROCEDURE — 250N000011 HC RX IP 250 OP 636: Performed by: EMERGENCY MEDICINE

## 2024-03-01 PROCEDURE — 10160 PNXR ASPIR ABSC HMTMA BULLA: CPT | Mod: GC | Performed by: RADIOLOGY

## 2024-03-01 PROCEDURE — 250N000009 HC RX 250: Performed by: EMERGENCY MEDICINE

## 2024-03-01 PROCEDURE — 99152 MOD SED SAME PHYS/QHP 5/>YRS: CPT | Mod: GC | Performed by: RADIOLOGY

## 2024-03-01 PROCEDURE — 250N000011 HC RX IP 250 OP 636: Performed by: STUDENT IN AN ORGANIZED HEALTH CARE EDUCATION/TRAINING PROGRAM

## 2024-03-01 PROCEDURE — G1010 CDSM STANSON: HCPCS | Performed by: RADIOLOGY

## 2024-03-01 PROCEDURE — 36415 COLL VENOUS BLD VENIPUNCTURE: CPT

## 2024-03-01 PROCEDURE — 76942 ECHO GUIDE FOR BIOPSY: CPT | Mod: 26 | Performed by: RADIOLOGY

## 2024-03-01 PROCEDURE — 250N000013 HC RX MED GY IP 250 OP 250 PS 637

## 2024-03-01 PROCEDURE — 85004 AUTOMATED DIFF WBC COUNT: CPT

## 2024-03-01 PROCEDURE — G1010 CDSM STANSON: HCPCS

## 2024-03-01 PROCEDURE — 250N000009 HC RX 250

## 2024-03-01 PROCEDURE — 99152 MOD SED SAME PHYS/QHP 5/>YRS: CPT

## 2024-03-01 PROCEDURE — 87040 BLOOD CULTURE FOR BACTERIA: CPT

## 2024-03-01 PROCEDURE — 85610 PROTHROMBIN TIME: CPT | Performed by: NURSE PRACTITIONER

## 2024-03-01 PROCEDURE — 87075 CULTR BACTERIA EXCEPT BLOOD: CPT | Performed by: PHYSICIAN ASSISTANT

## 2024-03-01 PROCEDURE — 74177 CT ABD & PELVIS W/CONTRAST: CPT | Mod: MG

## 2024-03-01 PROCEDURE — 36415 COLL VENOUS BLD VENIPUNCTURE: CPT | Performed by: NURSE PRACTITIONER

## 2024-03-01 PROCEDURE — 80048 BASIC METABOLIC PNL TOTAL CA: CPT

## 2024-03-01 PROCEDURE — 99222 1ST HOSP IP/OBS MODERATE 55: CPT | Mod: AI | Performed by: INTERNAL MEDICINE

## 2024-03-01 PROCEDURE — 99207 PR NO BILLABLE SERVICE THIS VISIT: CPT | Performed by: INTERNAL MEDICINE

## 2024-03-01 PROCEDURE — 0W9H3ZZ DRAINAGE OF RETROPERITONEUM, PERCUTANEOUS APPROACH: ICD-10-PCS | Performed by: RADIOLOGY

## 2024-03-01 PROCEDURE — 120N000002 HC R&B MED SURG/OB UMMC

## 2024-03-01 PROCEDURE — 272N000500 HC NEEDLE CR2

## 2024-03-01 PROCEDURE — 85610 PROTHROMBIN TIME: CPT

## 2024-03-01 RX ORDER — CEFTRIAXONE 1 G/1
1 INJECTION, POWDER, FOR SOLUTION INTRAMUSCULAR; INTRAVENOUS EVERY 24 HOURS
Status: DISCONTINUED | OUTPATIENT
Start: 2024-03-02 | End: 2024-03-01

## 2024-03-01 RX ORDER — CEFTRIAXONE 1 G/1
1 INJECTION, POWDER, FOR SOLUTION INTRAMUSCULAR; INTRAVENOUS EVERY 24 HOURS
Status: DISCONTINUED | OUTPATIENT
Start: 2024-03-02 | End: 2024-03-03

## 2024-03-01 RX ORDER — LIDOCAINE HYDROCHLORIDE 10 MG/ML
1-30 INJECTION, SOLUTION EPIDURAL; INFILTRATION; INTRACAUDAL; PERINEURAL
Status: COMPLETED | OUTPATIENT
Start: 2024-03-01 | End: 2024-03-01

## 2024-03-01 RX ORDER — VANCOMYCIN HYDROCHLORIDE 1 G/200ML
1000 INJECTION, SOLUTION INTRAVENOUS EVERY 12 HOURS
Status: DISCONTINUED | OUTPATIENT
Start: 2024-03-01 | End: 2024-03-01

## 2024-03-01 RX ORDER — LIDOCAINE 40 MG/G
CREAM TOPICAL
Status: DISCONTINUED | OUTPATIENT
Start: 2024-03-01 | End: 2024-03-04 | Stop reason: HOSPADM

## 2024-03-01 RX ORDER — NALOXONE HYDROCHLORIDE 0.4 MG/ML
0.4 INJECTION, SOLUTION INTRAMUSCULAR; INTRAVENOUS; SUBCUTANEOUS
Status: DISCONTINUED | OUTPATIENT
Start: 2024-03-01 | End: 2024-03-02

## 2024-03-01 RX ORDER — CALCIUM CARBONATE 500 MG/1
1000 TABLET, CHEWABLE ORAL 4 TIMES DAILY PRN
Status: DISCONTINUED | OUTPATIENT
Start: 2024-03-01 | End: 2024-03-04 | Stop reason: HOSPADM

## 2024-03-01 RX ORDER — POLYETHYLENE GLYCOL 3350 17 G/17G
17 POWDER, FOR SOLUTION ORAL 2 TIMES DAILY
Status: DISCONTINUED | OUTPATIENT
Start: 2024-03-01 | End: 2024-03-04 | Stop reason: HOSPADM

## 2024-03-01 RX ORDER — CEFTRIAXONE 2 G/1
2 INJECTION, POWDER, FOR SOLUTION INTRAMUSCULAR; INTRAVENOUS EVERY 24 HOURS
Status: DISCONTINUED | OUTPATIENT
Start: 2024-03-02 | End: 2024-03-01

## 2024-03-01 RX ORDER — AMOXICILLIN 250 MG
1 CAPSULE ORAL 2 TIMES DAILY PRN
Status: DISCONTINUED | OUTPATIENT
Start: 2024-03-01 | End: 2024-03-04 | Stop reason: HOSPADM

## 2024-03-01 RX ORDER — NALOXONE HYDROCHLORIDE 0.4 MG/ML
0.2 INJECTION, SOLUTION INTRAMUSCULAR; INTRAVENOUS; SUBCUTANEOUS
Status: DISCONTINUED | OUTPATIENT
Start: 2024-03-01 | End: 2024-03-02

## 2024-03-01 RX ORDER — IOPAMIDOL 755 MG/ML
58 INJECTION, SOLUTION INTRAVASCULAR ONCE
Status: COMPLETED | OUTPATIENT
Start: 2024-03-01 | End: 2024-03-01

## 2024-03-01 RX ORDER — CEFTRIAXONE 1 G/1
1 INJECTION, POWDER, FOR SOLUTION INTRAMUSCULAR; INTRAVENOUS ONCE
Qty: 10 ML | Refills: 0 | Status: COMPLETED | OUTPATIENT
Start: 2024-03-01 | End: 2024-03-01

## 2024-03-01 RX ORDER — AMOXICILLIN 250 MG
2 CAPSULE ORAL 2 TIMES DAILY PRN
Status: DISCONTINUED | OUTPATIENT
Start: 2024-03-01 | End: 2024-03-04 | Stop reason: HOSPADM

## 2024-03-01 RX ORDER — GLYCOPYRROLATE 1 MG/1
4 TABLET ORAL 3 TIMES DAILY
Status: DISCONTINUED | OUTPATIENT
Start: 2024-03-01 | End: 2024-03-04 | Stop reason: HOSPADM

## 2024-03-01 RX ORDER — FLUMAZENIL 0.1 MG/ML
0.2 INJECTION, SOLUTION INTRAVENOUS
Status: DISCONTINUED | OUTPATIENT
Start: 2024-03-01 | End: 2024-03-02

## 2024-03-01 RX ORDER — NADOLOL 20 MG/1
20 TABLET ORAL EVERY MORNING
Status: DISCONTINUED | OUTPATIENT
Start: 2024-03-01 | End: 2024-03-04 | Stop reason: HOSPADM

## 2024-03-01 RX ORDER — DOCUSATE SODIUM 100 MG/1
100 CAPSULE, LIQUID FILLED ORAL 2 TIMES DAILY
Status: DISCONTINUED | OUTPATIENT
Start: 2024-03-01 | End: 2024-03-04 | Stop reason: HOSPADM

## 2024-03-01 RX ORDER — AMOXICILLIN 250 MG
1 CAPSULE ORAL DAILY PRN
Status: DISCONTINUED | OUTPATIENT
Start: 2024-03-01 | End: 2024-03-04 | Stop reason: HOSPADM

## 2024-03-01 RX ORDER — FENTANYL CITRATE 50 UG/ML
25-50 INJECTION, SOLUTION INTRAMUSCULAR; INTRAVENOUS EVERY 5 MIN PRN
Status: DISCONTINUED | OUTPATIENT
Start: 2024-03-01 | End: 2024-03-02

## 2024-03-01 RX ADMIN — MIDAZOLAM 1 MG: 1 INJECTION INTRAMUSCULAR; INTRAVENOUS at 08:54

## 2024-03-01 RX ADMIN — SODIUM CHLORIDE, PRESERVATIVE FREE 65 ML: 5 INJECTION INTRAVENOUS at 00:46

## 2024-03-01 RX ADMIN — FENTANYL CITRATE 50 MCG: 50 INJECTION, SOLUTION INTRAMUSCULAR; INTRAVENOUS at 08:44

## 2024-03-01 RX ADMIN — LIDOCAINE HYDROCHLORIDE 5 ML: 10 INJECTION, SOLUTION EPIDURAL; INFILTRATION; INTRACAUDAL; PERINEURAL at 08:55

## 2024-03-01 RX ADMIN — MIDAZOLAM 1 MG: 1 INJECTION INTRAMUSCULAR; INTRAVENOUS at 08:44

## 2024-03-01 RX ADMIN — GLYCOPYRROLATE 4 MG: 1 TABLET ORAL at 21:39

## 2024-03-01 RX ADMIN — GLYCOPYRROLATE 2 MG: 1 TABLET ORAL at 09:23

## 2024-03-01 RX ADMIN — FENTANYL CITRATE 50 MCG: 50 INJECTION, SOLUTION INTRAMUSCULAR; INTRAVENOUS at 08:54

## 2024-03-01 RX ADMIN — IOPAMIDOL 58 ML: 755 INJECTION, SOLUTION INTRAVENOUS at 00:46

## 2024-03-01 RX ADMIN — NADOLOL 20 MG: 20 TABLET ORAL at 09:22

## 2024-03-01 RX ADMIN — CEFTRIAXONE SODIUM 1 G: 1 INJECTION, POWDER, FOR SOLUTION INTRAMUSCULAR; INTRAVENOUS at 02:23

## 2024-03-01 ASSESSMENT — ACTIVITIES OF DAILY LIVING (ADL)
ADLS_ACUITY_SCORE: 52
ADLS_ACUITY_SCORE: 52
ADLS_ACUITY_SCORE: 53
ADLS_ACUITY_SCORE: 41
ADLS_ACUITY_SCORE: 53
ADLS_ACUITY_SCORE: 41
ADLS_ACUITY_SCORE: 53
ADLS_ACUITY_SCORE: 52
ADLS_ACUITY_SCORE: 41
ADLS_ACUITY_SCORE: 53
ADLS_ACUITY_SCORE: 52
ADLS_ACUITY_SCORE: 52
ADLS_ACUITY_SCORE: 41
ADLS_ACUITY_SCORE: 53
ADLS_ACUITY_SCORE: 41
ADLS_ACUITY_SCORE: 53

## 2024-03-01 NOTE — PLAN OF CARE
Goal Outcome Evaluation:      Plan of Care Reviewed With: patient, parent, caregiver    Overall Patient Progress: no change    Alert, nonverbal. VSS. Puree diet, no appetite. Mitrofanoff drain in place. IR drained abscess in AM. Purulent sample too thick for lab testing per lab. PIV TKO for antibiotics. Wheelchair bound. Straight cath, caregivers manages.

## 2024-03-01 NOTE — PROVIDER NOTIFICATION
"\"ED21 S.Sandrita I was only able to get one set of blood cultures d/t pt not being able to stay still. Vanco not able to be given until 2nd set is obtained. Please advise. LAWRENCE Barragan [phone]\"  "

## 2024-03-01 NOTE — PROGRESS NOTES
[facility]    Progress Note - Medicine Service, JELENA TEAM 1       Date of Admission:  2/29/2024    Assessment & Plan   Robi is a 38-year-old female with past medical history notable for severe cerebral palsy--nonverbal & with spastic quadriplegia (wheeelchair bound), neurogenic bladder status post Mitrofanoff, remote hx of recurrent UTI/pyelo & long QT syndrome admitted for UTI & further management of right sided perinephric abscess found on CT.      #c/f UTI, perinephric abscess  #hx of recurrent UTI/pyelo  #neurogenic bladder s/p mitrofanoff  Remote hx of recurrent UTI, pyelo. Typically grows pansensitive E.coli though has grown pseudomonas & enterococcus on singular occasions. Last UTI & hospitalization in 2/2023 (grew mixed messi). Presented with decreased po intake, no other systemic symptoms. Vitals stable. Labs with new leukocytosis (16), improved today. UA with evidence of infection. CT with evidence of right sided perinephric abscess. Unclear etiology of abscess--seeding from bacteremia vs local spread from urinary infection. IR consulted to complete abscess aspiration, completed today. Has remained stable on ceftriaxone, but will continue to follow results of culture from abscess aspiration. Currently growing gram-negative bacilli. Unsure cause of abscess has no recent UTIs and [facility] and caregiver.   - continue ceftriaxone 2 gram Q24H  - follow urine culture and blood culture  - follow results of abscess aspiration culture, currently growing gram negative bacilli  - Urology consulted   > recommended straight cath Q4H while in hospital   > daily irrigation  - IR consulted - completed perinephric abscess aspiration    #colon wall thickening c/f colitis  #hx of constipation  Caregiver endorses some loose stools recently. No tenderness on exam. Normally constipated with extensive bowel regiment at home. No  obstruction on CT, moderate amount of stool on imaging.  - enteric panel, C diff pending  - holding PRN constipation medications, will restart if diarrhea resolves and she becomes constipated again     #cerebral palsy with quadriplegia   #nonverbal at baseline  Cared for by mom, dad & Neisha (caregiver). Alternates between three homes. Well cared for.   - continue PTA glycopyrrolate      #hx of long QT syndrome type 2  Per chart review-genetically tested. Father with hx of long QT syndrome with family hx of sudden cardiac death. Per caregiver patient with no symptoms, managed well with nadolol.  - continue PTA nadolol 20mg daily   - caution with Qtc prolonging meds         Diet: Pureed Diet (level 4) Thin Liquids (level 0)    DVT Prophylaxis: Pneumatic Compression Devices  Edouard Catheter: Not present  Fluids: None  Lines: None     Cardiac Monitoring: None  Code Status: Full Code      Clinically Significant Risk Factors Present on Admission               # Coagulation Defect: INR = 1.37 (Ref range: 0.85 - 1.15) and/or PTT = N/A, will monitor for bleeding                    Disposition Plan      Expected Discharge Date: 03/03/2024                The patient's care was discussed with the Attending Physician, Dr. Fajardo .    MARY COLLIER MD  Medicine Service, [facility] TEAM 1  [facility]  Securely message with [employer] (more info)  Text page via [email or web address] Paging/Directory   See signed in provider for up to date coverage information  ______________________________________________________________________    Interval History   Patient seen in ED this AM. Patient is nonverbal at baseline, but did not appear in distress. Discussed with father who notes that she thinks she looks quite comfortable at this time. Notes a history of decreased oral intake over the past couple days.     Physical Exam   Vital Signs:     BP: 102/73 Pulse: 74   Resp: (!) 9 SpO2: 95 % O2 Device: None (Room air)     Weight: 95 lbs 0 oz    General Appearance: NAD, laying comfortably in bed, nonverbal at baseline  Respiratory: CTAB, breathing non-labored on room air  Cardiovascular: RRR, no murmur noted  GI: soft, non-tender to palpation, non-distended, bowel sounds active  Extremities: no bilateral peripheral edema   Neuro: nonverbal at baseline, nonfocal neuro findings    Medical Decision Making       Please see A&P for additional details of medical decision making.      Data     I have personally reviewed the following data over the past 24 hrs:    15.0 (H)  \   11.2 (L)   / 378     137 102 15.5 /  95   4.6 23 0.38 (L) \     ALT: 11 AST: 14 AP: 84 TBILI: 0.3   ALB: 3.9 TOT PROTEIN: 8.3 LIPASE: N/A     INR:  1.37 (H) PTT:  N/A   D-dimer:  N/A Fibrinogen:  N/A       Imaging results reviewed over the past 24 hrs:   Recent Results (from the past 24 hour(s))   XR Abdomen 2 Views    Narrative    EXAM: XR ABDOMEN 2 VIEWS  LOCATION: [facility]  DATE: 2/29/2024    INDICATION: poor po intake, h o bowel impaction  COMPARISON: CT of the abdomen and pelvis 02/03/2023      Impression    IMPRESSION:     Rightward convexity curvature of the spine centered on L1. There are paraspinous rods with cerclage wires for scoliotic correction. IUD in the central pelvis.    Nonobstructive bowel gas pattern. No abnormal intra-abdominal calcifications. No pneumatosis or pneumoperitoneum.    Left hip dysplasia with chronic. Left hip is normally aligned. Dislocation of the humeral head which is superior to the acetabulum   CT Abdomen Pelvis w Contrast    Narrative    EXAM: CT ABDOMEN PELVIS W CONTRAST  LOCATION: [facility]  DATE: 3/1/2024    INDICATION: Poor P.O. intake. Nonverbal. Leukocytosis.  COMPARISON: 02/03/2023  TECHNIQUE: CT scan of the abdomen and pelvis was performed following injection of IV contrast. Multiplanar reformats were obtained. Dose reduction  techniques were used.  CONTRAST: iopamidol (ISOVUE 370) solution 58 mL    FINDINGS:   LOWER CHEST: Lung bases clear.    HEPATOBILIARY: Normal.    PANCREAS: Normal.    SPLEEN: Normal.    ADRENAL GLANDS: Normal.    KIDNEYS/BLADDER: Multiple bilateral renal hypodensities presumably cysts although some difficult to characterize due to streak artifact from spinal hardware. Complex fluid collection along the anterior margin of the right kidney 5.2 x 3.5 cm in size S4,   image 181 suggesting perinephric abscess.    BOWEL: Wall thickening of the right and transverse colon. Normal caliber bowel. Moderate amount of colonic stool.    LYMPH NODES: Normal.    VASCULATURE: Unremarkable.    PELVIC ORGANS: Status post mitranoff procedure. Bladder wall thickening not excluded. Air in the bladder presumably related to instrumentation. IUD.    MUSCULOSKELETAL: Thoracolumbar fusion hardware. Scoliosis.      Impression    IMPRESSION:   1.  Complex fluid collection along the anterior margin of the right kidney suggestive of abscess. There is adjacent wall thickening of the colon concerning for colitis.  2.  Bladder is under distended. Wall thickening/cystitis not excluded.   IR Retroperitoneal Abscess Drainage    Narrative    PROCEDURE: Right perinephric Fluid collection aspiration    Procedural Personnel  Attending physician(s): Dr. Cid  Fellow physician(s): None  Resident physician(s): Helio Fernandez  Advanced practice provider(s): None    Pre-procedure diagnosis: Right perinephric abscess  Post-procedure diagnosis: Same  Indication: Suspected abscess  Additional clinical history: None    Complications: No immediate complications.      Impression    IMPRESSION:    Percutaneous aspiration of the right perinephric abscess, yielding 20  mL of purulent fluid. No drainage catheter was left in place.    Plan:     Patient may transfer back to ED.     Sample sent to  lab.  ______________________________________________________________________    PROCEDURE SUMMARY:  - Puncture aspiration of abscessunder ultrasound guidance  - Additional procedure(s): None    PROCEDURE DETAILS:    Pre-procedure  Consent: Informed consent for the procedure including risks, benefits  and alternatives was obtained and time-out was performed prior to the  procedure.  Preparation: The site was prepared and draped using maximal sterile  barrier technique including cutaneous antisepsis.    Anesthesia/sedation  Level of anesthesia/sedation: Moderate sedation (conscious sedation)  Anesthesia/sedation administered by: Independent trained observer  under attending supervision with continuous monitoring of the  patient?s level of consciousness and physiologic status  Total intra-service sedation time (minutes): 20    Fluid collection aspiration  The patient was positioned supine. Initial imaging was performed.  Local anesthesia was administered. The fluid collection was accessed  using an access needle. Position within the fluid collection was  confirmed and fluid aspiration was performed. All instruments were  then removed.  Aspiration location: Right perinephric fluid collection  Initial imaging findings: Loculated right perinephric collection  Aspiration needle/catheter: 5 Fr Kiran  Contrast injection: No  Final imaging findings: Partial drainage of the fluid collection    Contrast  Contrast agent: None  Contrast volume (mL): 0    Radiation Dose  None    Additional Details  Additional description of procedure: None  Registry event: V/3/f  Device used: None  Equipment details: None  Specimens removed: 20 mL of purulent fluid. Aspirated fluid was sent  for analysis.  Estimated blood loss (mL): Less than 10  Standardized report: [email or web address]    Attestation  Signer name: HENRY FARRIS  I attest that I was present for the entire procedure. I reviewed the  stored images and agree with the  report as written.

## 2024-03-01 NOTE — IR NOTE
Patient Name: Robi Remy  Medical Record Number: [id number]  Today's Date: 3/1/2024    Procedure: Right taj-nephric aspiration  Proceduralist: Dr. Cid, Dr. Fernandez  Pathology present: NA    Procedure Start: 0851  Procedure end: 0859  Sedation medications administered: 100 mcg fentanyl and 2 mg versed (sedation time 0969-1340)    Report given to: Renato BRAVO ED  : SEVERIANO    Other Notes: Pt arrived to IR room 1 from ED. Consent reviewed.  Pt positioned supine and monitored per protocol. Pt tolerated procedure without any noted complications. Pt transferred back to ED.     Labs sent.  20 ml of purulent fluid aspirated.

## 2024-03-01 NOTE — ED PROVIDER NOTES
[place] EMERGENCY DEPARTMENT ([facility])    2/29/24       ED PROVIDER NOTE   History     Chief Complaint   Patient presents with    Poor oral intake     The history is provided by medical records and a parent.     Robi Remy is a 38 year old nonverbal female with history of cerebral palsy, neurogenic bladder with intermittent catheter, developmental delay who presents with caregiver for evaluation of decreased appetite and poor oral intake for over a week.  Patient is nonverbal and history is provided by patient's caregiver. Caregiver states that she normally does not have a poor appetite and this is very unusual for her.  They state that she is had little interest in food since 2/20/2024.  They state that occasionally she does not have an appetite but then usually eats a large amount during the next meal.  Caregiver is concerned as patient had history of bowel impaction and wonders if this may be happening again.  No fever or infectious symptoms.  Patient otherwise does not appear uncomfortable per caregiver.  No behavioral concerns.  No other symptoms noted.     PAST MEDICAL HISTORY:   Past Medical History:   Diagnosis Date    Cerebral palsy (H)     Developmental delay     Long Q-T syndrome     takes nadolol    Neurogenic bladder     Nonverbal     Self-catheterizes urinary bladder     Parents Cath patient    Spastic quadriplegia (H)        PAST SURGICAL HISTORY:   Past Surgical History:   Procedure Laterality Date    BACK SURGERY  2002    spine fusion    EXAM UNDER ANESTHESIA DENTAL  2010    EXAM UNDER ANESTHESIA PELVIC N/A 1/4/2018    Procedure: EXAM UNDER ANESTHESIA PELVIC;  Pap Smear, Breast exam, Mirena IUD removal and replacement.  Dental Exam, Radiographs, Dental Restorations, Periodontal Therapy, Dental Extractions, Biopsies   ;  Surgeon: Briseida Iyer MD;  [facility]    EXAM UNDER ANESTHESIA, RESTORATIONS, EXTRACTION(S) DENTAL, COMBINED N/A 1/4/2018    Procedure:  COMBINED EXAM UNDER ANESTHESIA, RESTORATIONS, EXTRACTION(S) DENTAL;  Dental exam, x- rays, periodontal therapy and flouride varnish;  Surgeon: Ara Urbano DDS;  [facility] OR    LAPAROSCOPIC MITROFANOFF PROCEDURE (APPENDIX CONDUIT) N/A 9/4/2014    Procedure: LAPAROSCOPIC MITROFANOFF PROCEDURE (APPENDIX CONDUIT);  Surgeon: Naren Bustos MD;  [facility]    LAPAROSCOPIC REPAIR BLADDER N/A 9/4/2014    Procedure: LAPAROSCOPIC REPAIR BLADDER;  Surgeon: Naren Bustos MD;  [facility]    REPLACE INTRAUTERINE DEVICE N/A 1/4/2018    Procedure: REPLACE INTRAUTERINE DEVICE;  Mirena IUD removal and replacement;  Surgeon: Briseida Iyer MD;  [facility]       Past medical history, past surgical history, medications, and allergies were reviewed with the patient. Additional pertinent items: None    FAMILY HISTORY:   Family History   Problem Relation Age of Onset    Heart Disease Father         long QT syndrome     Anesthesia Reaction No family hx of     Thrombosis No family hx of        SOCIAL HISTORY:   Social History     Tobacco Use    Smoking status: Never     Passive exposure: Never    Smokeless tobacco: Never   Substance Use Topics    Alcohol use: No     Social history was reviewed with the patient. Additional pertinent items: None    Patient's Medications   New Prescriptions    No medications on file   Previous Medications    DOCUSATE SODIUM (COLACE) 100 MG CAPSULE    Take 1 capsule (100 mg) by mouth 2 times daily    GLYCOPYRROLATE (GLYCATE) 2 MG TABLET    Take 4 mg by mouth 3 times daily    KETOCONAZOLE (NIZORAL) 2 % EXTERNAL SHAMPOO    Apply topically twice a week    MULTIPLE VITAMINS-IRON (MULTIVITAMIN/IRON PO)    Take 1 tablet by mouth every morning    NADOLOL (CORGARD) 20 MG TABLET    Take 1 tablet by mouth every morning    POLYETHYLENE GLYCOL (MIRALAX) 17 GM/DOSE POWDER    Take 17 g by mouth 2 times daily    SENNA-DOCUSATE (SENOKOT-S/PERICOLACE) 8.6-50 MG TABLET    Take  1 tablet by mouth daily as needed for constipation (constipation)   Modified Medications    No medications on file   Discontinued Medications    No medications on file          Allergies   Allergen Reactions    Tape [Adhesive Tape]      Skin blistering  Tape had been on for one month without changing. Unsure what tape       A complete review of systems was performed with pertinent positives and negatives noted in the HPI, and all other systems negative.    Physical Exam   BP: 108/75  Pulse: 78  Resp: 19  SpO2: 97 %      Physical Exam  Vitals and nursing note reviewed.   Constitutional:       General: She is not in acute distress.     Appearance: She is well-developed. She is not diaphoretic.      Comments: Uses wheelchair.  Nonverbal.   HENT:      Head: Normocephalic and atraumatic.      Mouth/Throat:      Pharynx: No oropharyngeal exudate.   Eyes:      General: No scleral icterus.        Right eye: No discharge.         Left eye: No discharge.      Pupils: Pupils are equal, round, and reactive to light.   Cardiovascular:      Rate and Rhythm: Normal rate and regular rhythm.      Heart sounds: Normal heart sounds. No murmur heard.     No friction rub. No gallop.   Pulmonary:      Effort: Pulmonary effort is normal. No respiratory distress.      Breath sounds: Normal breath sounds. No wheezing.   Chest:      Chest wall: No tenderness.   Abdominal:      General: Bowel sounds are normal. There is no distension.      Palpations: Abdomen is soft.      Tenderness: There is no abdominal tenderness.   Musculoskeletal:         General: No tenderness or deformity. Normal range of motion.      Cervical back: Normal range of motion and neck supple.   Skin:     General: Skin is warm and dry.      Coloration: Skin is not pale.      Findings: No erythema or rash.   Neurological:      Mental Status: She is alert.      Cranial Nerves: No cranial nerve deficit.         ED Course     ED Course as of 03/01/24 0250   u Feb 29, 2024    3789 Comprehensive metabolic panel  Instrument was down for a period of time and lab is in the process of catching up on backlog of labs, unable to give eta. This sample had delta calcium and delta chloride and is being re-run.     Procedures                      Labs Ordered and Resulted from Time of ED Arrival to Time of ED Departure - No data to display              Assessments & Plan (with Medical Decision Making)   This 38-year-old female with a history of cerebral palsy and developmental delay who is nonverbal at baseline who presents with caregivers due to decreased p.o. intake.  This started 9 days ago with no known precipitating factors.  They state patient is not interested in food which is unusual for her.  No other associated symptoms with this.  Exam demonstrates no acute abnormalities.  They note patient has not had any behavioral issues and is not appearing to be in pain.  Lab work shows WBC count of 17.3.  UA shows possible UTI however patient does have Mitrofanoff.  X-ray abdomen shows no acute changes, does show chronic changes.  CT abdomen/pelvis shows large right perinephric abscess.  Monisha this with Urology.  Patient was given ceftriaxone and vancomycin.  We will admit for further monitoring workup and treatment.    I have reviewed the nursing notes.    I have reviewed the findings, diagnosis, plan and need for follow up with the patient.    New Prescriptions    No medications on file       Final diagnoses:   None     Blayne Maradiaga DO    2/29/2024   [facility] EMERGENCY DEPARTMENT         Blayne Maradiaga, DO  03/01/24 0251

## 2024-03-01 NOTE — PROCEDURES
[facility]    Procedure: IR right perinephric abscess asipration    Date/Time: 3/1/2024 9:02 AM    Performed by: Abiel Cid MD  Authorized by: Abiel Cid MD  IR Fellow Physician:  Radiology Resident Physician: Helio Fernandez        UNIVERSAL PROTOCOL   Site Marked: NA  Prior Images Obtained and Reviewed:  Yes  Required items: Required blood products, implants, devices and special equipment available    Patient identity confirmed:  Verbally with patient, arm band, provided demographic data and hospital-assigned identification number  Patient was reevaluated immediately before administering moderate or deep sedation or anesthesia  Confirmation Checklist:  Patient's identity using two indicators, relevant allergies, procedure was appropriate and matched the consent or emergent situation and correct equipment/implants were available  Time out: Immediately prior to the procedure a time out was called    Universal Protocol: the Joint Commission Universal Protocol was followed    Preparation: Patient was prepped and draped in usual sterile fashion    ESBL (mL):  0     ANESTHESIA    Anesthesia: Local infiltration  Local Anesthetic:  Lidocaine 1% without epinephrine  Anesthetic Total (mL):  8      SEDATION  Patient Sedated: Yes    Sedation:  Fentanyl and midazolam  Vital signs: Vital signs monitored during sedation    See dictated procedure note for full details.  Findings: Unchanged loculated right perinephric fluid collection    Specimens: fluid and/or tissue for gram stain and culture    Complications: None    Condition: Stable    Plan: Patient may transfer back to ED      PROCEDURE  Describe Procedure: Uncomplicated ultrasound guided right perinephric abscess aspiration. 20 ml of purulent fluid aspirated and sent to lab.  Patient Tolerance:  Patient tolerated the procedure well with no immediate complications  Length of time physician/provider present for  1:1 monitoring during sedation: 20

## 2024-03-01 NOTE — PROGRESS NOTES
Urology Brief Note:    Discussion with the patient's caregiver.      At baseline, Robi receives straight catheterization every 4-6 hours via Mitrofanoff at umbilicus using a 14Fr straight-tipped nonlatex straight catheter.  At night they usually let her sleep. No recent issues with passing the catheter.  Typical outputs are recorded in a book and range 200-300cc.  It would be exceedingly rare for her care team to miss a catheterization and overdistend the bladder.  They also irrigate once daily.    - Never incontinence via urethra but rarely will leak a small amount via Mitrofanoff if the bladder is full  - No recent UTIs  - No Hx pyelonephritis     A/P  1) S/p distant bladder augment/cath channel  - Orders placed for straight cath every 4 hours in the hospital using 14Fr nonlatex catheter  - Orders placed for daily irrigation    AAKASH [facility]

## 2024-03-01 NOTE — PRE-PROCEDURE
GENERAL PRE-PROCEDURE:   Procedure:  Abdominal abscess aspiration    Written consent obtained?: Yes    Risks and benefits: Risks, benefits and alternatives were discussed    Consent given by:  Patient  Patient states understanding of procedure being performed: Yes    Patient's understanding of procedure matches consent: Yes    Procedure consent matches procedure scheduled: Yes    Expected level of sedation:  Moderate  Appropriately NPO:  Yes  ASA Class:  2  Mallampati  :  Grade 2- soft palate, base of uvula, tonsillar pillars, and portion of posterior pharyngeal wall visible  Lungs:  Lungs clear with good breath sounds bilaterally  Heart:  Normal heart sounds and rate  History & Physical reviewed:  History and physical reviewed and no updates needed  Statement of review:  I have reviewed the lab findings, diagnostic data, medications, and the plan for sedation

## 2024-03-01 NOTE — PHARMACY-VANCOMYCIN DOSING SERVICE
Pharmacy Vancomycin Initial Note  Date of Service 2024  Patient's  1985  38 year old, female    Indication: Intra-abdominal infection    Current estimated CrCl = Estimated Creatinine Clearance: 121.9 mL/min (A) (based on SCr of 0.43 mg/dL (L)).    Creatinine for last 3 days  2024: 10:12 PM Creatinine 0.43 mg/dL    Recent Vancomycin Level(s) for last 3 days  No results found for requested labs within last 3 days.      Vancomycin IV Administrations (past 72 hours)        No vancomycin orders with administrations in past 72 hours.                    Nephrotoxins and other renal medications (From now, onward)      None            Contrast Orders - past 72 hours (72h ago, onward)      Start     Dose/Rate Route Frequency Stop    24 0040  iopamidol (ISOVUE-370) solution 58 mL         58 mL Intravenous ONCE 24 0046            [employer] Prediction of Planned Initial Vancomycin Regimen  Regimen: 1000 mg IV every 12 hours.  Start time: 03:41 on 2024  Exposure target: AUC24 (range)400-600 mg/L.hr   AUC24,ss: 438 mg/L.hr  Probability of AUC24 > 400: 59 %  Ctrough,ss: 11 mg/L  Probability of Ctrough,ss > 20: 13 %  Probability of nephrotoxicity (Lodise KAYLIE ): 7 %    Plan:  Start vancomycin  1000 mg IV q12h.   Vancomycin monitoring method: AUC  Vancomycin therapeutic monitoring goal: 400-600 mg*h/L  Pharmacy will check vancomycin levels as appropriate in 1-3 Days.    Serum creatinine levels will be ordered daily for the first week of therapy and at least twice weekly for subsequent weeks.      Bravo Martinez, [facility]  [id number]

## 2024-03-01 NOTE — CONSULTS
Urologic Surgery Consultation Note  [facility]    Robi Remy MRN# [id number]   Age: 38 year old YOB: 1985     Date of Admission:  2/29/2024    Reason for consult: Perinephric abscess, NGB, hx of CCIC       Requesting physician: .ATTENDPROV        Level of consult: Consult, follow and place orders           Assessment:   Robi is a 38-year-old female, with a history of cerebral palsy, and long QT syndrome, with associated neurogenic bladder, and is status post bladder augmentation with a ileocecalcystoplasty in 2014 by Dr. Bustos.  She presents with 1 week of decreased p.o. intake.  No subjective or objective fevers and is continuing to catheterize without any issues, labs revealed a mild leukocytosis of 17.  UA is dirty appearing with positive nitrate, pyuria, and few bacteria, but difficult to interpret in the setting of CCIC.  CT scan showed a large 5 cm perinephric fluid collection anterior to the right kidney.  Her left kidney appears to be relatively atrophied.  It is not exactly clear the etiology of the perinephric abscess.  Given the leukocytosis and the size of the fluid collection, percutaneous drainage with empiric culture directed antibiotics is recommended.         Recommendations:   - NPO  -Recommend IR drainage of the perinephric fluid collection  -Should obtain culture from the fluid collection  -She is under the excellent care of her primary medicine service  -She received Rocephin in the emergency department, agree with empiric and then culture directed antibiotics treatment          History of Present Illness:   CC: Reduced p.o. intake     History is obtained from the patient caretaker    Robi is a 38-year-old female, with a history of cerebral palsy, and long QT syndrome, with associated neurogenic bladder, and is status post bladder augmentation with a ileocecalcystoplasty in 2014 by Dr. Butsos.  She presents with 1 week of decreased p.o. intake.     Direct history is limited due to patient's cognitive function, history was obtained from her caretaker  Robi has not been doing well in the last week or so, she seems to have lost appetite and did not want to eat food  There was not any nausea vomiting or any recorded fevers  Her father did notice that she felt hot but when he took her temperature it was 99F  Otherwise she continues to manage her bladder with the catheterizing channel without any difficulties  Denies any significant hematuria          Past Medical History:     Past Medical History:   Diagnosis Date    Cerebral palsy (H)     Developmental delay     Long Q-T syndrome     takes nadolol    Neurogenic bladder     Nonverbal     Self-catheterizes urinary bladder     Parents Cath patient    Spastic quadriplegia (H)              Past Surgical History:     Past Surgical History:   Procedure Laterality Date    BACK SURGERY  2002    spine fusion    EXAM UNDER ANESTHESIA DENTAL  2010    EXAM UNDER ANESTHESIA PELVIC N/A 1/4/2018    Procedure: EXAM UNDER ANESTHESIA PELVIC;  Pap Smear, Breast exam, Mirena IUD removal and replacement.  Dental Exam, Radiographs, Dental Restorations, Periodontal Therapy, Dental Extractions, Biopsies   ;  Surgeon: Briseida Iyer MD;  [facility]    EXAM UNDER ANESTHESIA, RESTORATIONS, EXTRACTION(S) DENTAL, COMBINED N/A 1/4/2018    Procedure: COMBINED EXAM UNDER ANESTHESIA, RESTORATIONS, EXTRACTION(S) DENTAL;  Dental exam, x- rays, periodontal therapy and flouride varnish;  Surgeon: Ara Urbano DDS;  [facility]    LAPAROSCOPIC MITROFANOFF PROCEDURE (APPENDIX CONDUIT) N/A 9/4/2014    Procedure: LAPAROSCOPIC MITROFANOFF PROCEDURE (APPENDIX CONDUIT);  Surgeon: Naren Bustos MD;  [facility]    LAPAROSCOPIC REPAIR BLADDER N/A 9/4/2014    Procedure: LAPAROSCOPIC REPAIR BLADDER;  Surgeon: Naren Bustos MD;  [facility]    REPLACE INTRAUTERINE DEVICE N/A 1/4/2018    Procedure: REPLACE  INTRAUTERINE DEVICE;  Mirena IUD removal and replacement;  Surgeon: Briseida Iyer MD;  [facility]             Social History:     Social History     Socioeconomic History    Marital status: Single     Spouse name: Not on file    Number of children: Not on file    Years of education: Not on file    Highest education level: Not on file   Occupational History    Not on file   Tobacco Use    Smoking status: Never     Passive exposure: Never    Smokeless tobacco: Never   Substance and Sexual Activity    Alcohol use: No    Drug use: No    Sexual activity: Not on file   Other Topics Concern    Not on file   Social History Narrative    Not on file     Social Determinants of Health     Financial Resource Strain: Not on file   Food Insecurity: Not on file   Transportation Needs: Not on file   Physical Activity: Not on file   Stress: Not on file   Social Connections: Not on file   Interpersonal Safety: Not on file   Housing Stability: Not on file             Family History:     Family History   Problem Relation Age of Onset    Heart Disease Father         long QT syndrome     Anesthesia Reaction No family hx of     Thrombosis No family hx of              Allergies:      Allergies   Allergen Reactions    Tape [Adhesive Tape]      Skin blistering  Tape had been on for one month without changing. Unsure what tape             Medications:   levonorgestrel (MIRENA) 20 MCG/24HR IUD    docusate sodium (COLACE) 100 MG capsule, Take 1 capsule (100 mg) by mouth 2 times daily  glycopyrrolate (GLYCATE) 2 MG tablet, Take 4 mg by mouth 3 times daily  ketoconazole (NIZORAL) 2 % external shampoo, Apply topically twice a week  Multiple Vitamins-Iron (MULTIVITAMIN/IRON PO), Take 1 tablet by mouth every morning  nadolol (CORGARD) 20 MG tablet, Take 1 tablet by mouth every morning  polyethylene glycol (MIRALAX) 17 GM/Dose powder, Take 17 g by mouth 2 times daily  senna-docusate (SENOKOT-S/PERICOLACE) 8.6-50 MG tablet, Take 1 tablet  "by mouth daily as needed for constipation (constipation)              Review of Systems:      All other review of systems negative, except for what is mentioned above        Physical Exam:   /76 (BP Location: Left arm)   Pulse 78   Resp 18   Ht 1.473 m (4' 10\")   Wt 43.1 kg (95 lb)   SpO2 92%   BMI 19.86 kg/m    GEN:   not in acute distress, lying comfortably  HEENT: atraumatic, mucosa moist  CVS: normal heart rate, perfusing extremeties  RESP: no resp distress, satting well on RA  ABD: soft, appears nondistended  : Deferred  EXT: Extremities atraumatic, grossly normal range of motion  NEURO/PSYCH: Limited cognitive function, not in any distress              Data:     Recent Labs   Lab Test 02/29/24  2212 02/05/23  0656 02/04/23  0538   WBC 17.3* 10.1 9.1   HGB 12.1 10.6* 12.6   CR 0.43* 0.40* 0.45*         CT Abdomen Pelvis w Contrast 03/01/2024    Narrative  EXAM: CT ABDOMEN PELVIS W CONTRAST  LOCATION: [facility]  DATE: 3/1/2024    INDICATION: Poor P.O. intake. Nonverbal. Leukocytosis.  COMPARISON: 02/03/2023  TECHNIQUE: CT scan of the abdomen and pelvis was performed following injection of IV contrast. Multiplanar reformats were obtained. Dose reduction techniques were used.  CONTRAST: iopamidol (ISOVUE 370) solution 58 mL    FINDINGS:  LOWER CHEST: Lung bases clear.    HEPATOBILIARY: Normal.    PANCREAS: Normal.    SPLEEN: Normal.    ADRENAL GLANDS: Normal.    KIDNEYS/BLADDER: Multiple bilateral renal hypodensities presumably cysts although some difficult to characterize due to streak artifact from spinal hardware. Complex fluid collection along the anterior margin of the right kidney 5.2 x 3.5 cm in size S4,  image 181 suggesting perinephric abscess.    BOWEL: Wall thickening of the right and transverse colon. Normal caliber bowel. Moderate amount of colonic stool.    LYMPH NODES: Normal.    VASCULATURE: Unremarkable.    PELVIC ORGANS: Status post " mitranoff procedure. Bladder wall thickening not excluded. Air in the bladder presumably related to instrumentation. IUD.    MUSCULOSKELETAL: Thoracolumbar fusion hardware. Scoliosis.    Impression  IMPRESSION:  1.  Complex fluid collection along the anterior margin of the right kidney suggestive of abscess. There is adjacent wall thickening of the colon concerning for colitis.  2.  Bladder is under distended. Wall thickening/cystitis not excluded.           Discussed with staff surgeon Dr. Laird, who agrees with the assessment and plans    Dariel Wetzel MD   PGY-2 Urology  [facility]    Agree with the plan  Discussed with residents.  I did not personally see patient  Derek Laird MD

## 2024-03-01 NOTE — H&P
[facility]    History and Physical - Medicine Service, JELENA TEAM        Date of Admission:  2/29/2024    Assessment & Plan   Robi is a 38-year-old female with past medical history notable for severe cerebral palsy--nonverbal & with spastic quadriplegia (wheeelchair bound), neurogenic bladder status post Mitrofanoff, remote hx of recurrent UTI/pyelo & long QT syndrome admitted for UTI & further management of right sided perinephric abscess found on CT.     #c/f UTI, perinephric abscess  #hx of recurrent UTI/pyelo  #neurogenic bladder s/p mitrofanoff  Remote hx of recurrent UTI, pyelo. Typically grows pansensitive E.coli though has grown pseudomonas & enterococcus on singular occasions. Last UTI & hospitalization in 2/2023 (grew mixed messi). Presented with decreased po intake, no other systemic symptoms. Vitals stable. Labs with new leukocytosis (16). UA with evidence of infection (though with ileocecalcystoplasty) CT with evidence of right sided perinephric abscess. Unclear etiology of abscess--seeding from bacteremia vs local spread from urinary infection.  - discontinued vancomycin   - continue ceftriaxone (received in ED)  - intra-abdominal infection, thus could consider zosyn for anaerobic coverage but given clinical stability and likely urinary source will continue with current abx  - follow urine cultures  - blood cx ordered  - urology consulted, appreciate cares  - IR consulted for drainage    #colon wall thickening c/f colitis  #hx of constipation  Caregiver endorses some loose stools recently. No tenderness on exam. Normally constipated with extensive bowel regiment at home. No obstruction on CT, moderate amount of stool on imaging.  - enteric panel, C diff  - hold PTA meds (miralax, colace) in setting of possible diarrhea/pending studies (though could be overflow)    #cerebral palsy with quadriplegia   #nonverbal at baseline  Cared for by mom, dad &  Neisha (caregiver). Alternates between three homes. Well cared for.   - continue PTA glycopyrrolate     #hx of long QT syndrome type 2  Per chart review-genetically tested. Father with hx of long QT syndrome with family hx of sudden cardiac death. Per caregiver patient with no symptoms, managed well with nadolol.  - EKG ordered, consider tele  - continue PTA nadolol 20mg daily   - caution with Qtc prolonging meds      Diet: pureed  DVT Prophylaxis: Pneumatic Compression Devices  Edouard Catheter: Not present  Fluids: none  Lines: None     Cardiac Monitoring: None  Code Status:  full code    Clinically Significant Risk Factors Present on Admission                                  Disposition Plan      Expected Discharge Date: 03/03/2024                Formally staffed in AM.      Nakia Baez MD  Medicine Service, [facility]  Securely message with EPS (more info)  Text page via [facility] Paging/Directory   See signed in provider for up to date coverage information  ______________________________________________________________________    Chief Complaint   Decreased po intake    Hx obtained from caregiver     History of Present Illness   Robi is a 38-year-old female with past medical history notable for severe cerebral palsy--nonverbal & with spastic quadriplegia (wheeelchair bound), neurogenic bladder status post Mitrofanoff, remote hx of recurrent UTI/pyelo & long QT syndrome who presented to the ED with caregiver for decreased po intake.     History obtained from Neisha, patient caregiver. Over the last 7 to 10 days patient has had decreased appetite and p.o. intake.  At one point her father noticed that she felt warm but did not have any recorded fevers.  Also noticed that her urine may have been darker than usual.  She has not had any other associated symptoms.  No fevers or chills, nausea, vomiting, or any signs of distress.  She is nonverbal and  wheelchair-bound at baseline.  Caregiver notes that she has been happy and participating in activities as usual.     Past Medical History    Past Medical History:   Diagnosis Date    Cerebral palsy (H)     Developmental delay     Long Q-T syndrome     takes nadolol    Neurogenic bladder     Nonverbal     Self-catheterizes urinary bladder     Parents Cath patient    Spastic quadriplegia (H)        Past Surgical History   Past Surgical History:   Procedure Laterality Date    BACK SURGERY  2002    spine fusion    EXAM UNDER ANESTHESIA DENTAL  2010    EXAM UNDER ANESTHESIA PELVIC N/A 1/4/2018    Procedure: EXAM UNDER ANESTHESIA PELVIC;  Pap Smear, Breast exam, Mirena IUD removal and replacement.  Dental Exam, Radiographs, Dental Restorations, Periodontal Therapy, Dental Extractions, Biopsies   ;  Surgeon: Briseida Iyer MD;  Location: [facility] OR    EXAM UNDER ANESTHESIA, RESTORATIONS, EXTRACTION(S) DENTAL, COMBINED N/A 1/4/2018    Procedure: COMBINED EXAM UNDER ANESTHESIA, RESTORATIONS, EXTRACTION(S) DENTAL;  Dental exam, x- rays, periodontal therapy and flouride varnish;  Surgeon: Ara Urbano DDS;  Location: [facility] OR    LAPAROSCOPIC MITROFANOFF PROCEDURE (APPENDIX CONDUIT) N/A 9/4/2014    Procedure: LAPAROSCOPIC MITROFANOFF PROCEDURE (APPENDIX CONDUIT);  Surgeon: Naren Bustos MD;  [facility]: [facility] OR    LAPAROSCOPIC REPAIR BLADDER N/A 9/4/2014    Procedure: LAPAROSCOPIC REPAIR BLADDER;  Surgeon: Naren Bustos MD;  [facility]    REPLACE INTRAUTERINE DEVICE N/A 1/4/2018    Procedure: REPLACE INTRAUTERINE DEVICE;  Mirena IUD removal and replacement;  Surgeon: Briseida Iyer MD;  Location: [facility] OR       Prior to Admission Medications   Prior to Admission Medications   Prescriptions Last Dose Informant Patient Reported? Taking?   Multiple Vitamins-Iron (MULTIVITAMIN/IRON PO)  Care Giver Yes No   Sig: Take 1 tablet by mouth every morning   docusate sodium (COLACE) 100 MG capsule  Care  Giver No No   Sig: Take 1 capsule (100 mg) by mouth 2 times daily   glycopyrrolate (GLYCATE) 2 MG tablet  Care Giver Yes No   Sig: Take 4 mg by mouth 3 times daily   ketoconazole (NIZORAL) 2 % external shampoo  Care Giver Yes No   Sig: Apply topically twice a week   nadolol (CORGARD) 20 MG tablet  Care Giver Yes No   Sig: Take 1 tablet by mouth every morning   polyethylene glycol (MIRALAX) 17 GM/Dose powder   No No   Sig: Take 17 g by mouth 2 times daily   senna-docusate (SENOKOT-S/PERICOLACE) 8.6-50 MG tablet   No No   Sig: Take 1 tablet by mouth daily as needed for constipation (constipation)      Facility-Administered Medications: None        Physical Exam   Vital Signs:     BP: 109/76 Pulse: 78   Resp: 18 SpO2: 92 % O2 Device: None (Room air)    Weight: 95 lbs 0 oz    General: in no acute distress, lying comfortably in bed  Respiratory: breathing comfortably on room air, no increased work  Cardiovascular: regular rate and rhythm  GI: Soft, nontender non-distended  Skin: no concerning rashes or lesions  Neuro: nonverbal at baseline      Medical Decision Making       Data     I have personally reviewed the following data over the past 24 hrs:    17.3 (H)  \   12.1   / 376     135 98 17.8 /  102 (H)   4.6 28 0.43 (L) \     ALT: 11 AST: 14 AP: 84 TBILI: 0.3   ALB: 3.9 TOT PROTEIN: 8.3 LIPASE: N/A

## 2024-03-01 NOTE — CONSULTS
"      [facility] Consult Service Note  Interventional Radiology  03/01/24   6:50 AM    Consult Requested: \"Perinephric abscess\"    Recommendations/Plan:    Patient is approved for CT guided aspiration of right perinephric abscess. It is likely that our access will have to go transpleural.   Timing of procedure is TBD based on IR staffing/schedule and triage.  Please contact the IR charge RN at [phone] for estimated time of procedure.     Labs WNL for procedure. INR pending.    Orders entered for procedure, NPO status. Patient is currently on IV antibiotics.   Medications to be held include: None.   Informed consent will be completed prior to procedure.     Patient will require consent via both guardians.     Case and imaging discussed with IR attending Dr. Abiel Cid MD   Recommendations were reviewed with team at [phone]. Page sent at 7:15AM    History of Present Illness:  Robi Remy is a 38-year-old female, with a history of cerebral palsy, non-verbal at baseline, long QT syndrome, with associated neurogenic bladder, and is status post bladder augmentation with a ileocecalcystoplasty in 2014 by Dr. Bustos.  Patient presented on 2/29/24 with 1 week of decreased p.o. intake.  Patient positive for leukocytosis  (17.3). CT demonstrated complex fluid collection along the anterior margin of the right kidney concerning for abscess. Patient has also been having some loose stools, ruling out c.diff. Request is for IR intervention for perinephric abscess.     Diagnostic labs to be entered by: Orders placed by primary team.      Pertinent Imaging Reviewed:       Expected date of discharge:  03/03/2024    Vitals:   /76 (BP Location: Left arm)   Pulse 78   Resp 18   Ht 1.473 m (4' 10\")   Wt 43.1 kg (95 lb)   SpO2 92%   BMI 19.86 kg/m      Pertinent Labs Reviewed:  CBC:  Lab Results   Component Value Date    WBC 17.3 (H) 02/29/2024    WBC 10.1 02/05/2023    WBC 9.1 02/04/2023    " "WBC 13.1 (H) 09/07/2014    WBC 18.3 (H) 09/06/2014    WBC 13.6 (H) 09/05/2014     Lab Results   Component Value Date    HGB 12.1 02/29/2024    HGB 10.6 02/05/2023    HGB 12.6 02/04/2023    HGB 10.8 09/07/2014    HGB 11.9 09/06/2014    HGB 10.9 09/05/2014     Lab Results   Component Value Date     02/29/2024     02/05/2023     02/04/2023     09/07/2014     09/06/2014     09/05/2014    INR:  No results found for: \"INR\", \"PTT\"       COVID Results:  COVID-19 Antibody Results, Testing for Immunity           No data to display              COVID-19 PCR Results          2/29/2024    22:14   COVID-19 PCR Results   SARS CoV2 PCR Negative     Potassium   Date Value Ref Range Status   02/29/2024 4.6 3.4 - 5.3 mmol/L Final   09/07/2014 3.7 3.4 - 5.3 mmol/L Final          Camille Yusuf PA-C  Interventional Radiology  Pager: [phone]    "

## 2024-03-01 NOTE — PROVIDER NOTIFICATION
"\"ED21 Rosa We are having a difficult time getting labs, it might be easier to draw blood from feet because she doesn't move them as often. If OK, could you place a nursing care order? Thanks! LAWRENCE Barragan [phone]\"  "

## 2024-03-01 NOTE — ED TRIAGE NOTES
Ambulatory to ED, pt is non verbal and is developmentally delayed- history being given by caregiver. Here for lack of appetite for over a week, which is unusual for pt. PCG concerned as pt had history of bowel impaction and might be contributory to lack of interest in food. No other symptoms reported

## 2024-03-02 LAB
ALBUMIN SERPL BCG-MCNC: 3.5 G/DL (ref 3.5–5.2)
ALP SERPL-CCNC: 76 U/L (ref 40–150)
ALT SERPL W P-5'-P-CCNC: <5 U/L (ref 0–50)
ANION GAP SERPL CALCULATED.3IONS-SCNC: 10 MMOL/L (ref 7–15)
AST SERPL W P-5'-P-CCNC: 12 U/L (ref 0–45)
BASOPHILS # BLD AUTO: 0.1 10E3/UL (ref 0–0.2)
BASOPHILS NFR BLD AUTO: 1 %
BILIRUB SERPL-MCNC: 0.2 MG/DL
BUN SERPL-MCNC: 12.8 MG/DL (ref 6–20)
CALCIUM SERPL-MCNC: 9.3 MG/DL (ref 8.6–10)
CHLORIDE SERPL-SCNC: 102 MMOL/L (ref 98–107)
CREAT SERPL-MCNC: 0.42 MG/DL (ref 0.51–0.95)
DEPRECATED HCO3 PLAS-SCNC: 25 MMOL/L (ref 22–29)
EGFRCR SERPLBLD CKD-EPI 2021: >90 ML/MIN/1.73M2
EOSINOPHIL # BLD AUTO: 0.3 10E3/UL (ref 0–0.7)
EOSINOPHIL NFR BLD AUTO: 2 %
ERYTHROCYTE [DISTWIDTH] IN BLOOD BY AUTOMATED COUNT: 12.6 % (ref 10–15)
GLUCOSE SERPL-MCNC: 90 MG/DL (ref 70–99)
HCT VFR BLD AUTO: 36.7 % (ref 35–47)
HGB BLD-MCNC: 12.1 G/DL (ref 11.7–15.7)
IMM GRANULOCYTES # BLD: 0 10E3/UL
IMM GRANULOCYTES NFR BLD: 0 %
LYMPHOCYTES # BLD AUTO: 1.8 10E3/UL (ref 0.8–5.3)
LYMPHOCYTES NFR BLD AUTO: 17 %
MCH RBC QN AUTO: 27.9 PG (ref 26.5–33)
MCHC RBC AUTO-ENTMCNC: 33 G/DL (ref 31.5–36.5)
MCV RBC AUTO: 85 FL (ref 78–100)
MONOCYTES # BLD AUTO: 0.9 10E3/UL (ref 0–1.3)
MONOCYTES NFR BLD AUTO: 8 %
NEUTROPHILS # BLD AUTO: 7.7 10E3/UL (ref 1.6–8.3)
NEUTROPHILS NFR BLD AUTO: 72 %
NRBC # BLD AUTO: 0 10E3/UL
NRBC BLD AUTO-RTO: 0 /100
PLATELET # BLD AUTO: 363 10E3/UL (ref 150–450)
POTASSIUM SERPL-SCNC: 4.6 MMOL/L (ref 3.4–5.3)
PROT SERPL-MCNC: 7.8 G/DL (ref 6.4–8.3)
RBC # BLD AUTO: 4.34 10E6/UL (ref 3.8–5.2)
SODIUM SERPL-SCNC: 137 MMOL/L (ref 135–145)
WBC # BLD AUTO: 10.8 10E3/UL (ref 4–11)

## 2024-03-02 PROCEDURE — 99223 1ST HOSP IP/OBS HIGH 75: CPT | Mod: 24 | Performed by: STUDENT IN AN ORGANIZED HEALTH CARE EDUCATION/TRAINING PROGRAM

## 2024-03-02 PROCEDURE — 99232 SBSQ HOSP IP/OBS MODERATE 35: CPT | Mod: GC | Performed by: INTERNAL MEDICINE

## 2024-03-02 PROCEDURE — 36415 COLL VENOUS BLD VENIPUNCTURE: CPT

## 2024-03-02 PROCEDURE — 250N000013 HC RX MED GY IP 250 OP 250 PS 637

## 2024-03-02 PROCEDURE — 250N000011 HC RX IP 250 OP 636

## 2024-03-02 PROCEDURE — 85025 COMPLETE CBC W/AUTO DIFF WBC: CPT

## 2024-03-02 PROCEDURE — 120N000002 HC R&B MED SURG/OB UMMC

## 2024-03-02 PROCEDURE — 258N000003 HC RX IP 258 OP 636

## 2024-03-02 PROCEDURE — 82040 ASSAY OF SERUM ALBUMIN: CPT

## 2024-03-02 RX ADMIN — GLYCOPYRROLATE 4 MG: 1 TABLET ORAL at 08:03

## 2024-03-02 RX ADMIN — CEFTRIAXONE SODIUM 1 G: 1 INJECTION, POWDER, FOR SOLUTION INTRAMUSCULAR; INTRAVENOUS at 06:14

## 2024-03-02 RX ADMIN — SODIUM CHLORIDE, POTASSIUM CHLORIDE, SODIUM LACTATE AND CALCIUM CHLORIDE 500 ML: 600; 310; 30; 20 INJECTION, SOLUTION INTRAVENOUS at 20:38

## 2024-03-02 RX ADMIN — NADOLOL 20 MG: 20 TABLET ORAL at 08:03

## 2024-03-02 ASSESSMENT — ACTIVITIES OF DAILY LIVING (ADL)
ADLS_ACUITY_SCORE: 55
ADLS_ACUITY_SCORE: 57
ADLS_ACUITY_SCORE: 53
ADLS_ACUITY_SCORE: 57
ADLS_ACUITY_SCORE: 53
ADLS_ACUITY_SCORE: 55
ADLS_ACUITY_SCORE: 57
ADLS_ACUITY_SCORE: 57
ADLS_ACUITY_SCORE: 53
ADLS_ACUITY_SCORE: 55
ADLS_ACUITY_SCORE: 55
ADLS_ACUITY_SCORE: 53
ADLS_ACUITY_SCORE: 55
ADLS_ACUITY_SCORE: 53
ADLS_ACUITY_SCORE: 55
ADLS_ACUITY_SCORE: 53
ADLS_ACUITY_SCORE: 57
ADLS_ACUITY_SCORE: 57

## 2024-03-02 NOTE — PROVIDER NOTIFICATION
1618 Pt continues to have poor oral intake. Pt's parents requesting IV maintenance fluids. Paged Zainab 1 intern for order.

## 2024-03-02 NOTE — PLAN OF CARE
"Goal Outcome Evaluation:      Plan of Care Reviewed With: patient, caregiver    Overall Patient Progress: no changeOverall Patient Progress: no change  Shift: 6390-4145  VS: /76   Pulse 88   Temp 97.6  F (36.4  C) (Axillary)   Resp 18   Ht 1.473 m (4' 10\")   Wt 43.1 kg (95 lb)   SpO2 98%   BMI 19.86 kg/m    Pain:  per family Pt is not in  pain Remains comfortable in bed.   Neuro: Awake and alert. But at baseline Pt is spastic quadriplegia,  and nonverbal.   Cardiac: No cp and sob reported.  Respiratory: Remains on RA  Diet/Appetite:   Pt family fed her with cereal.  GI/: Pt has Mitrofanoff and st cath q 4 hrs.  LDA's: Sl  Skin: wnl, no new skin deficit noted  Activity: Dependent with cares.wheel chair bound. Turn and repo in bed.  Tests/Procedures: AM Lab     Plan: IV ceftriaxone  "

## 2024-03-02 NOTE — PROGRESS NOTES
[facility]    Progress Note - Medicine Service, JELENA TEAM 1       Date of Admission:  2/29/2024    Assessment & Plan   Robi is a 38-year-old female with past medical history notable for severe cerebral palsy--nonverbal & with spastic quadriplegia (wheeelchair bound), neurogenic bladder status post Mitrofanoff, remote hx of recurrent UTI/pyelo & long QT syndrome admitted for UTI & further management of right sided perinephric abscess found on CT.  Stable and improving on ceftriaxone.       Today:  - Urine culture with pansensitive E coli, abscess culture growing GNR   - continue with ceftriaxone, monitor culture data  - ID consult today to help guide antibiotic course      #UTI and perinephric abscess 2/2 E. Coli infection  #hx of recurrent UTI/pyelo  #neurogenic bladder s/p mitrofanoff  Remote hx of recurrent UTI, pyelo. Typically grows pansensitive E.coli though has grown pseudomonas & enterococcus on singular occasions. Last UTI & hospitalization in 2/2023 (grew mixed messi). Presented with decreased po intake, no other systemic symptoms. Vitals stable. Labs with new leukocytosis (16), improved today. UA with evidence of infection. CT with evidence of right sided perinephric abscess. Unclear etiology of abscess--seeding from bacteremia vs local spread from urinary infection. IR consulted to complete abscess aspiration, completed today. Has remained stable on ceftriaxone, but will continue to follow results of culture from abscess aspiration. Currently growing gram-negative bacilli on aerobic gram stain, culture pending. Urine culture with pansensitive E. Coli. Unsure etiology of abscess has no recent UTIs and receives excellent care via Mitrofanoff by her parents and caregiver, though is possible UTI developed and then ascended to kidney given family reports of ~1 week to 10 days of decreasing PO intake, which was abnormal for her and may have indicated the start  of an infection.   - continue ceftriaxone 2 gram Q24H  - ID consulted, appreciate recs  - follow urine cultures and blood cultures   - urine culture with E coli  - abscess aspiration culture with gram negative bacilli  - Urology consulted   > recommended straight cath Q4H while in hospital   > daily irrigation  - IR completed perinephric abscess aspiration 3/1/24, cultures pending but gram stain with GNR    #colon wall thickening c/f colitis  #hx of constipation  Caregiver endorses some loose stools recently. No tenderness on exam. Normally constipated with extensive bowel regimen at home. No obstruction on CT, moderate amount of stool on imaging.   - will cancel C diff and enteric panel with no diarrhea/stool output x24 hours  - holding PRN constipation medications, will restart if diarrhea resolves and she becomes constipated again     #cerebral palsy with quadriplegia   #nonverbal at baseline  Cared for by mom, dad & Neisha (caregiver). Alternates between three homes. Well cared for.   - continue PTA glycopyrrolate      #hx of long QT syndrome type 2  Per chart review-genetically tested. Father with hx of long QT syndrome with family hx of sudden cardiac death. Per caregiver patient with no symptoms, managed well with nadolol.  - continue PTA nadolol 20mg daily   - caution with Qtc prolonging meds         Diet: Pureed Diet (level 4) Thin Liquids (level 0)    DVT Prophylaxis: Pneumatic Compression Devices  Edouard Catheter: Not present  Fluids: None  Lines: None     Cardiac Monitoring: None  Code Status: Full Code      Clinically Significant Risk Factors               # Coagulation Defect: INR = 1.37 (Ref range: 0.85 - 1.15) and/or PTT = N/A, will monitor for bleeding                      Disposition Plan         The patient's care was discussed with the Attending Physician, Dr. Fajardo .    Keyanna Valdez MD  Medicine Service, [facility] TEAM 1  [facility]  Securely message  with Martin (more info)  Text page via [facility] Paging/Directory   See signed in provider for up to date coverage information  ______________________________________________________________________    Interval History   No acute events overnight. Urine culture resulted with >100k E. Coli, pansensitive, abscess cultures remain pending. Patient is nonverbal at baseline, but does not appear in distress, sleeping comfortably. Family reports that she was able to eat several bites of her food last night and finished some of her cereal, where she was only eating a couple of bites of food a day prior to her admission.     Physical Exam   Vital Signs:     BP: 99/71 Pulse: 78   Resp: 18 SpO2: 93 % O2 Device: None (Room air)    Weight: 95 lbs 0 oz    General Appearance: NAD, laying comfortably in bed, nonverbal at baseline  Respiratory: CTAB, breathing non-labored on room air  Cardiovascular: RRR, no murmur noted  GI: soft, non-tender to palpation, non-distended, bowel sounds active  Extremities: no bilateral peripheral edema   Neuro: nonverbal at baseline, nonfocal neuro findings    Medical Decision Making       Please see A&P for additional details of medical decision making.      Data     I have personally reviewed the following data over the past 24 hrs:    N/A  \   N/A   / N/A     N/A N/A N/A /  N/A   N/A N/A N/A \     INR:  1.37 (H) PTT:  N/A   D-dimer:  N/A Fibrinogen:  N/A       Imaging results reviewed over the past 24 hrs:   Recent Results (from the past 24 hour(s))   IR Retroperitoneal Abscess Drainage    Narrative    PROCEDURE: Right perinephric Fluid collection aspiration    Procedural Personnel  Attending physician(s): Dr. Cid    Procedure performed by Dr. Fernandez under my supervision. I, Dr. Abiel Cid, was present for the entire procedure.    Fellow physician(s): None  Resident physician(s): Helio Fernandez  Advanced practice provider(s): None    Pre-procedure diagnosis: Right perinephric  abscess  Post-procedure diagnosis: Same  Indication: Suspected abscess  Additional clinical history: None    Complications: No immediate complications.      Impression    IMPRESSION:    Percutaneous aspiration of the right perinephric abscess, yielding 20  mL of purulent fluid. No drainage catheter was left in place.    Plan:     Patient may transfer back to ED.     Sample sent to lab.  ______________________________________________________________________    PROCEDURE SUMMARY:  - Puncture aspiration of abscessunder ultrasound guidance  - Additional procedure(s): None    PROCEDURE DETAILS:    Pre-procedure  Consent: Informed consent for the procedure including risks, benefits  and alternatives was obtained and time-out was performed prior to the  procedure.  Preparation: The site was prepared and draped using maximal sterile  barrier technique including cutaneous antisepsis.    Anesthesia/sedation  Level of anesthesia/sedation: Moderate sedation (conscious sedation)  Anesthesia/sedation administered by: Independent trained observer  under attending supervision with continuous monitoring of the  patient?s level of consciousness and physiologic status  Total intra-service sedation time (minutes): 20    Fluid collection aspiration  The patient was positioned supine. Initial imaging was performed.  Local anesthesia was administered. The fluid collection was accessed  using an access needle. Position within the fluid collection was  confirmed and fluid aspiration was performed. All instruments were  then removed.  Aspiration location: Right perinephric fluid collection  Initial imaging findings: Loculated right perinephric collection  Aspiration needle/catheter: 5 Fr Kiran  Contrast injection: No  Final imaging findings: Partial drainage of the fluid collection    Contrast  Contrast agent: None  Contrast volume (mL): 0    Radiation Dose  None    Additional Details  Additional description of procedure: None  Registry  event: V/3/f  Device used: None  Equipment details: None  Specimens removed: 20 mL of purulent fluid. Aspirated fluid was sent  for analysis.  Estimated blood loss (mL): Less than 10  Standardized report: [email or web address]    Attestation  Signer name: HENRY FARRIS  I attest that I was present for the entire procedure. I reviewed the  stored images and agree with the report as written.    I have personally reviewed the examination and initial interpretation  and I agree with the findings.    HENRY FARRIS         SYSTEM ID:  [id number]

## 2024-03-02 NOTE — CONSULTS
[facility]  [facility] ID Service: Initial Consultation     Patient:  Robi Remy, Date of birth 1985, Medical record number [id number]  Date of Visit:  March 2, 2024  Consult Requested by: Jorge Benavides MD         Assessment and Recommendations:   ID Problem List:  Right perinephric abscess  S/p IR aspiration 3/1  Cultures with GNR, suspect E coli  Complicated UTI  Ucx (2/29) with pan-susceptible E coli  Neurogenic bladder with Mitrofanoff    Recommendations:  Continue ceftriaxone while awaiting further micro data from abscess cultures  Would not pursue enteric panel and C difficile testing - colon wall thickening only noted in wall adjacent to perinephric abscess, almost certainly extension of inflammatory and not a focal GI infection. Pt has not had GI symptoms per report.  Will continue to monitor blood cultures colleted 3/1 - no growth to date  If any positive, please obtain repeat blood cultures x 2 sets    Discussion:  39yo F with h/o spastic quadriplegia and developmental delay, neurogenic bladder with Mitrofanoff, long Q-T syndrome who present on 2/29 with decrease PO x 1 week per caregiver (pt is nonverbal), found to have complicated UTI and right perinephric abscess, now s/p aspiration by IR on 3/1. Ucx with growth of pan-susceptible E coli, and abscess cultures with 4+ GNR (identification pending).     My suspicion is that the source of the perinephric abscess is an ascending UTI. Pt is nonverbal and thus difficult to discern subtle changes in symptoms. Her poor PO intake has been noticeable for a week, so certainly possible mild UTI one week ago progressed and ascended, resulting in this abscess. Greatly appreciate IR aspirating and culturing this, should help speed recovery and aid in appropriate antibiotic selection.    For now, would continue ceftriaxone while we await further data from the abscess culture, though I suspect we will see more of the same E coli here.     Serjio Wilde  MD  Division of Infectious Diseases and International Medicine  P: [phone]       History of Present Illness:     39yo F with h/o CP with spastic quadriplegia and developmental delay, neurogenic bladder with Mitrofanoff, long Q-T syndrome who presented on 2/29 with decrease PO x 1 week per caregiver (pt is nonverbal).     Per caregiver, pt has not had fever or signs of pain or distress apart from poor PO intake sine 2/20, which is uncommon for her. Initial ER examination was unrevealing, with initial work-up significant for WBC 17.3. UA was concerning for UTI, with urine culture from 2/29 now growing >100k pan-susceptible E coli. XR abd was unremarkable apart from chronic changes related to known conditions (scoliosis, left hip dysplasia). A CT abd/pelvis demonstrated a 5cm complex fluid collection along the right kidney suggestive of perinephric abscess, as well as thickening of adjacent colon wall.     IR performed aspiration of the perinephric abscess on 3/1, removing 20ml of purulent material. Gram stain of the aspirated fluid was positive for 2+ GNR, with culture now growing 4+ GNR, likely more E coli. Pt was started on ceftriaxone on 3/1 (ER note says vancomycin and ceftriaxone started on 2/29, vancomycin stopped prior to first dose).           Review of Systems:   Full 10 point ROS obtained, pertinent positives and negatives as above.       Past Medical History:     Past Medical History:   Diagnosis Date    Cerebral palsy (H)     Developmental delay     Long Q-T syndrome     takes nadolol    Neurogenic bladder     Nonverbal     Self-catheterizes urinary bladder     Parents Cath patient    Spastic quadriplegia (H)      Past Surgical History:   Procedure Laterality Date    BACK SURGERY  2002    spine fusion    EXAM UNDER ANESTHESIA DENTAL  2010    EXAM UNDER ANESTHESIA PELVIC N/A 1/4/2018    Procedure: EXAM UNDER ANESTHESIA PELVIC;  Pap Smear, Breast exam, Mirena IUD removal and replacement.  Dental Exam,  Radiographs, Dental Restorations, Periodontal Therapy, Dental Extractions, Biopsies   ;  Surgeon: Briseida Iyer MD;  Location: [facility] OR    EXAM UNDER ANESTHESIA, RESTORATIONS, EXTRACTION(S) DENTAL, COMBINED N/A 1/4/2018    Procedure: COMBINED EXAM UNDER ANESTHESIA, RESTORATIONS, EXTRACTION(S) DENTAL;  Dental exam, x- rays, periodontal therapy and flouride varnish;  Surgeon: Ara Urbano DDS;  Location: [facility] OR    IR RETROPERITONEAL ABSCESS DRAINAGE  3/1/2024    LAPAROSCOPIC MITROFANOFF PROCEDURE (APPENDIX CONDUIT) N/A 9/4/2014    Procedure: LAPAROSCOPIC MITROFANOFF PROCEDURE (APPENDIX CONDUIT);  Surgeon: Naren Bustos MD;  Location: [facility] OR    LAPAROSCOPIC REPAIR BLADDER N/A 9/4/2014    Procedure: LAPAROSCOPIC REPAIR BLADDER;  Surgeon: Naren Bustos MD;  Location: [facility] OR    REPLACE INTRAUTERINE DEVICE N/A 1/4/2018    Procedure: REPLACE INTRAUTERINE DEVICE;  Mirena IUD removal and replacement;  Surgeon: Briseida Iyer MD;  Location: [facility] OR         Allergies:      Allergies   Allergen Reactions    Tape [Adhesive Tape]      Skin blistering  Tape had been on for one month without changing. Unsure what tape          Current Antimicrobials:     Ceftriaxone       Family History:     Family History   Problem Relation Age of Onset    Heart Disease Father         long QT syndrome     Anesthesia Reaction No family hx of     Thrombosis No family hx of         Social History:     Social History     Socioeconomic History    Marital status: Single     Spouse name: Not on file    Number of children: Not on file    Years of education: Not on file    Highest education level: Not on file   Occupational History    Not on file   Tobacco Use    Smoking status: Never     Passive exposure: Never    Smokeless tobacco: Never   Substance and Sexual Activity    Alcohol use: No    Drug use: No    Sexual activity: Not on file   Other Topics Concern    Not on file   Social History Narrative    Not on file      Social Determinants of Health     Financial Resource Strain: Not on file   Food Insecurity: Not on file   Transportation Needs: Not on file   Physical Activity: Not on file   Stress: Not on file   Social Connections: Not on file   Interpersonal Safety: Not on file   Housing Stability: Not on file          Physical Exam:   Ranges forvital signs:  Pulse:  [78-94] 78  Resp:  [12-18] 18  BP: (96-99)/(60-71) 99/71  SpO2:  [93 %-98 %] 98 %    Intake/Output Summary (Last 24 hours) at 3/2/2024 1038  Last data filed at 3/1/2024 2100  Gross per 24 hour   Intake --   Output 450 ml   Net -450 ml     Exam:  GENERAL:  well-developed, well-nourished, sitting in bed in no acute distress. Nonverbal.  ENT:  Head is normocephalic, atraumatic. Oropharynx is moist without exudates or ulcers.  EYES:  Eyes have anicteric sclerae.    NECK:  Supple.  LUNGS:  Clear to auscultation.  CARDIOVASCULAR:  Regular rate and rhythm with no murmurs, gallops or rubs.  ABDOMEN:  Normal bowel sounds, soft, no apparent signs of TTP. Mitrofanoff site very healthy and clean.  EXT: Extremities warm and without edema.  SKIN:  No acute rashes.    NEUROLOGIC:  Baseline spastic quadriplegia, nonverbal.          Laboratory Data:     Reviewed.  Pertinent for:    Microbiology:  Culture   Date Value Ref Range Status   03/01/2024 Culture in progress  Preliminary   03/01/2024 4+ Gram negative bacilli (A)  Preliminary   03/01/2024 No growth after 1 day  Preliminary   03/01/2024 No growth after 1 day  Preliminary   02/29/2024 >100,000 CFU/mL Escherichia coli (A)  Final   02/03/2023 >100,000 CFU/mL Mixture of urogenital messi  Final     Metabolic Studies       Recent Labs   Lab Test 03/02/24  0713 03/01/24  0654 02/29/24  2212 02/05/23  0656 02/04/23  0538 02/03/23  0942 02/03/23  0211    137 135 144 142  --  137   POTASSIUM 4.6 4.6 4.6 3.0* 3.8  --  4.3   CHLORIDE 102 102 98 116* 110*  --  98   CO2 25 23 28 19* 22  --  26   ANIONGAP 10 12 9 9 10  --  13   BUN  12.8 15.5 17.8 10.7 20.2*  --  40.5*   CR 0.42* 0.38* 0.43* 0.40* 0.45*  --  0.54   GFRESTIMATED >90 >90 >90 >90 >90  --  >90   GLC 90 95 102* 86 118*  --  154*   BRYAN 9.3 9.7 9.9 7.6* 8.4*  --  10.5*   MAG  --   --  2.1  --   --   --   --    LACT  --   --   --   --   --  0.9  --      Hepatic Studies    Recent Labs   Lab Test 03/02/24  0713 02/29/24 2212 02/05/23  0656 02/04/23  0538 02/03/23  0211   BILITOTAL 0.2 0.3 0.3 0.4 0.5   ALKPHOS 76 84 46 56 89   ALBUMIN 3.5 3.9 2.5* 3.4* 4.6   AST 12 14 19 30 24   ALT <5 11 5* 12 18     Pancreatitis testing    Recent Labs   Lab Test 02/03/23  0211   LIPASE 11*     Hematology Studies      Recent Labs   Lab Test 03/02/24  0713 03/01/24  0654 02/29/24 2212 02/05/23  0656 02/04/23  0538 02/03/23  0211   WBC 10.8 15.0* 17.3* 10.1 9.1 12.6*   HGB 12.1 11.2* 12.1 10.6* 12.6 15.5   HCT 36.7 35.0 37.9 33.8* 40.0 47.8*    378 376 226 260 356     Urine Studies     Recent Labs   Lab Test 02/29/24 2219 02/03/23  0153   URINEPH 6.0 6.5   NITRITE Positive* Negative   LEUKEST Moderate* Large*   WBCU 59* >182*         Latest Ref Rng & Units 3/2/2024     7:13 AM 3/1/2024     6:54 AM 2/29/2024    10:12 PM 2/5/2023     6:56 AM 2/4/2023     5:38 AM   Transplant Immunosuppression Labs   Creat 0.51 - 0.95 mg/dL 0.42  0.38  0.43  0.40  0.45    Urea Nitrogen 6.0 - 20.0 mg/dL 12.8  15.5  17.8  10.7  20.2    WBC 4.0 - 11.0 10e3/uL 10.8  15.0  17.3  10.1  9.1    Neutrophil % 72  72  77   71           Imaging:   CT Abdomen Pelvis w Contrast  Result Date: 3/1/2024  IMPRESSION: 1.  Complex fluid collection along the anterior margin of the right kidney suggestive of abscess. There is adjacent wall thickening of the colon concerning for colitis. 2.  Bladder is under distended. Wall thickening/cystitis not excluded.    XR Abdomen 2 Views  Result Date: 2/29/2024  IMPRESSION: Rightward convexity curvature of the spine centered on L1. There are paraspinous rods with cerclage wires for scoliotic  correction. IUD in the central pelvis. Nonobstructive bowel gas pattern. No abnormal intra-abdominal calcifications. No pneumatosis or pneumoperitoneum. Left hip dysplasia with chronic. Left hip is normally aligned. Dislocation of the humeral head which is superior to the acetabulum

## 2024-03-03 LAB
ALBUMIN SERPL BCG-MCNC: 3.4 G/DL (ref 3.5–5.2)
ALP SERPL-CCNC: 72 U/L (ref 40–150)
ALT SERPL W P-5'-P-CCNC: <5 U/L (ref 0–50)
ANION GAP SERPL CALCULATED.3IONS-SCNC: 11 MMOL/L (ref 7–15)
AST SERPL W P-5'-P-CCNC: 10 U/L (ref 0–45)
BACTERIA ABSC ANAEROBE+AEROBE CULT: ABNORMAL
BASOPHILS # BLD AUTO: 0.1 10E3/UL (ref 0–0.2)
BASOPHILS NFR BLD AUTO: 0 %
BILIRUB SERPL-MCNC: 0.2 MG/DL
BUN SERPL-MCNC: 16 MG/DL (ref 6–20)
CALCIUM SERPL-MCNC: 9.2 MG/DL (ref 8.6–10)
CHLORIDE SERPL-SCNC: 102 MMOL/L (ref 98–107)
CREAT SERPL-MCNC: 0.48 MG/DL (ref 0.51–0.95)
DEPRECATED HCO3 PLAS-SCNC: 24 MMOL/L (ref 22–29)
EGFRCR SERPLBLD CKD-EPI 2021: >90 ML/MIN/1.73M2
EOSINOPHIL # BLD AUTO: 0.3 10E3/UL (ref 0–0.7)
EOSINOPHIL NFR BLD AUTO: 3 %
ERYTHROCYTE [DISTWIDTH] IN BLOOD BY AUTOMATED COUNT: 12.6 % (ref 10–15)
GLUCOSE SERPL-MCNC: 161 MG/DL (ref 70–99)
GRAM STAIN RESULT: ABNORMAL
GRAM STAIN RESULT: ABNORMAL
HCT VFR BLD AUTO: 36.6 % (ref 35–47)
HGB BLD-MCNC: 11.7 G/DL (ref 11.7–15.7)
HOLD SPECIMEN: NORMAL
IMM GRANULOCYTES # BLD: 0.1 10E3/UL
IMM GRANULOCYTES NFR BLD: 0 %
LYMPHOCYTES # BLD AUTO: 1.9 10E3/UL (ref 0.8–5.3)
LYMPHOCYTES NFR BLD AUTO: 17 %
MCH RBC QN AUTO: 26.8 PG (ref 26.5–33)
MCHC RBC AUTO-ENTMCNC: 32 G/DL (ref 31.5–36.5)
MCV RBC AUTO: 84 FL (ref 78–100)
MONOCYTES # BLD AUTO: 0.9 10E3/UL (ref 0–1.3)
MONOCYTES NFR BLD AUTO: 8 %
NEUTROPHILS # BLD AUTO: 8.2 10E3/UL (ref 1.6–8.3)
NEUTROPHILS NFR BLD AUTO: 72 %
NRBC # BLD AUTO: 0 10E3/UL
NRBC BLD AUTO-RTO: 0 /100
PLATELET # BLD AUTO: 407 10E3/UL (ref 150–450)
POTASSIUM SERPL-SCNC: 3.9 MMOL/L (ref 3.4–5.3)
PROT SERPL-MCNC: 7.6 G/DL (ref 6.4–8.3)
RBC # BLD AUTO: 4.36 10E6/UL (ref 3.8–5.2)
SODIUM SERPL-SCNC: 137 MMOL/L (ref 135–145)
WBC # BLD AUTO: 11.4 10E3/UL (ref 4–11)

## 2024-03-03 PROCEDURE — 85025 COMPLETE CBC W/AUTO DIFF WBC: CPT

## 2024-03-03 PROCEDURE — 250N000011 HC RX IP 250 OP 636

## 2024-03-03 PROCEDURE — 258N000003 HC RX IP 258 OP 636

## 2024-03-03 PROCEDURE — 99232 SBSQ HOSP IP/OBS MODERATE 35: CPT | Mod: GC | Performed by: INTERNAL MEDICINE

## 2024-03-03 PROCEDURE — 120N000002 HC R&B MED SURG/OB UMMC

## 2024-03-03 PROCEDURE — 36415 COLL VENOUS BLD VENIPUNCTURE: CPT

## 2024-03-03 PROCEDURE — 80053 COMPREHEN METABOLIC PANEL: CPT

## 2024-03-03 PROCEDURE — 99233 SBSQ HOSP IP/OBS HIGH 50: CPT | Mod: 24 | Performed by: STUDENT IN AN ORGANIZED HEALTH CARE EDUCATION/TRAINING PROGRAM

## 2024-03-03 PROCEDURE — 250N000013 HC RX MED GY IP 250 OP 250 PS 637

## 2024-03-03 RX ORDER — SODIUM CHLORIDE, SODIUM LACTATE, POTASSIUM CHLORIDE, CALCIUM CHLORIDE 600; 310; 30; 20 MG/100ML; MG/100ML; MG/100ML; MG/100ML
INJECTION, SOLUTION INTRAVENOUS CONTINUOUS
Status: DISCONTINUED | OUTPATIENT
Start: 2024-03-03 | End: 2024-03-03

## 2024-03-03 RX ORDER — SULFAMETHOXAZOLE/TRIMETHOPRIM 800-160 MG
1 TABLET ORAL 2 TIMES DAILY
Status: DISCONTINUED | OUTPATIENT
Start: 2024-03-03 | End: 2024-03-04 | Stop reason: HOSPADM

## 2024-03-03 RX ADMIN — SODIUM CHLORIDE, POTASSIUM CHLORIDE, SODIUM LACTATE AND CALCIUM CHLORIDE: 600; 310; 30; 20 INJECTION, SOLUTION INTRAVENOUS at 15:52

## 2024-03-03 RX ADMIN — CEFTRIAXONE SODIUM 1 G: 1 INJECTION, POWDER, FOR SOLUTION INTRAMUSCULAR; INTRAVENOUS at 02:19

## 2024-03-03 RX ADMIN — GLYCOPYRROLATE 4 MG: 1 TABLET ORAL at 13:53

## 2024-03-03 RX ADMIN — NADOLOL 20 MG: 20 TABLET ORAL at 13:53

## 2024-03-03 RX ADMIN — SODIUM CHLORIDE, POTASSIUM CHLORIDE, SODIUM LACTATE AND CALCIUM CHLORIDE: 600; 310; 30; 20 INJECTION, SOLUTION INTRAVENOUS at 10:23

## 2024-03-03 RX ADMIN — SULFAMETHOXAZOLE AND TRIMETHOPRIM 1 TABLET: 800; 160 TABLET ORAL at 20:05

## 2024-03-03 ASSESSMENT — ACTIVITIES OF DAILY LIVING (ADL)
ADLS_ACUITY_SCORE: 57
ADLS_ACUITY_SCORE: 53
ADLS_ACUITY_SCORE: 57

## 2024-03-03 NOTE — PROGRESS NOTES
Brief update: Cultures growing E Coli, agree likely secondary to ascending urinary infection, however, in discussion with her care team it seems like her cath schedule is appropriate.     - Will arrange for CT and follow-up in 3-4 weeks to ensure resolution/improvement in renal abscess  - Continue medical management in the interim, appreciate ID recs  - Urology to sign off at this time    Discussed with Dr. Sisi De Souza MD  Urology PGY-4

## 2024-03-03 NOTE — PROVIDER NOTIFICATION
Provider, Janice Moore, notified of maculopapular rash developing on pt's abdomen. Will notify if symptoms are worsening.

## 2024-03-03 NOTE — PROGRESS NOTES
[facility]    Progress Note - Medicine Service, JELENA TEAM 1       Date of Admission:  2/29/2024    Assessment & Plan   Robi is a 38-year-old female with past medical history notable for severe cerebral palsy--nonverbal & with spastic quadriplegia (wheeelchair bound), neurogenic bladder status post Mitrofanoff, remote hx of recurrent UTI/pyelo & long QT syndrome admitted for UTI & further management of right sided perinephric abscess found on CT.  Stable and improving on ceftriaxone.       Today:  - abscess culture with pansensitive E. Coli, consistent with urine culture  - continue with ceftriaxone, awaiting ID recommendations  - 1L LR  at 125 mL/hr  - still decreased PO intake  - restarted miralax and colace    #UTI and perinephric abscess 2/2 E. Coli infection  #hx of recurrent UTI/pyelo  #neurogenic bladder s/p mitrofanoff  Remote hx of recurrent UTI, pyelo. Typically grows pansensitive E.coli though has grown pseudomonas & enterococcus on singular occasions. Last UTI & hospitalization in 2/2023 (grew mixed messi). Presented with decreased po intake, no other systemic symptoms. Vitals stable. Labs with new leukocytosis (16), improved today. UA with evidence of infection. CT with evidence of right sided perinephric abscess. Unclear etiology of abscess--seeding from bacteremia vs local spread from urinary infection. IR consulted to complete abscess aspiration, completed today. Has remained stable on ceftriaxone, but will continue to follow results of culture from abscess aspiration. Currently growing gram-negative bacilli on aerobic gram stain, culture pending. Urine culture with pansensitive E. Coli. Unsure etiology of abscess has no recent UTIs and receives excellent care via Mitrofanoff by her parents and caregiver, though is possible UTI developed and then ascended to kidney given family reports of ~1 week to 10 days of decreasing PO intake, which was abnormal  for her and may have indicated the start of an infection.   - continue ceftriaxone 2 gram Q24H, awaiting further ID recs  - follow urine cultures and blood cultures   - urine culture with E coli   - abscess culture with E. Coli   - Blood culture NGTD  - Urology consulted   > recommended straight cath Q4H while in hospital   > daily irrigation   > repeat CT in 3-4 weeks to monitor resolution  - IR completed perinephric abscess aspiration 3/1/24, cultures pending but gram stain with GNR    #colon wall thickening c/f colitis  #hx of constipation  Caregiver endorses some loose stools recently. No tenderness on exam. Normally constipated with extensive bowel regimen at home. No obstruction on CT, moderate amount of stool on imaging.   - will cancel C diff and enteric panel with no diarrhea/stool output x24 hours  - restarted constipation medications with no recent bowel movements     #cerebral palsy with quadriplegia   #nonverbal at baseline  Cared for by mom, dad & Neisha (caregiver). Alternates between three homes. Well cared for.   - continue PTA glycopyrrolate      #hx of long QT syndrome type 2  Per chart review-genetically tested. Father with hx of long QT syndrome with family hx of sudden cardiac death. Per caregiver patient with no symptoms, managed well with nadolol.  - continue PTA nadolol 20mg daily   - caution with Qtc prolonging meds         Diet: Pureed Diet (level 4) Thin Liquids (level 0)    DVT Prophylaxis: Pneumatic Compression Devices  Edouard Catheter: Not present  Fluids: None  Lines: None     Cardiac Monitoring: None  Code Status: Full Code      Clinically Significant Risk Factors              # Hypoalbuminemia: Lowest albumin = 3.4 g/dL at 3/3/2024  7:50 AM, will monitor as appropriate  # Coagulation Defect: INR = 1.37 (Ref range: 0.85 - 1.15) and/or PTT = N/A, will monitor for bleeding                      Disposition Plan         The patient's care was discussed with the Attending Physician,   Scotty .    Keyanna Valdez MD  Medicine Service, MAROON TEAM 1  [facility]  Securely message with [employer] (more info)  Text page via [employer] Paging/Directory   See signed in provider for up to date coverage information  ______________________________________________________________________    Interval History   No acute events overnight. Abscess culture positive for pansensitive E. Coli. Grandfather at bedside noted that patient seems to be more awake and alert today and she does not seem as pale. He notes that her oral intake has been very minimal and she is only taking a couple bites every so often. Prior to hospitalization, she was eating 2-3 meals per day, but she does not seem to be at her baseline at this time.     Physical Exam   Vital Signs: Temp: 98.1  F (36.7  C) Temp src: Axillary BP: 122/62 Pulse: 90   Resp: 18 SpO2: 96 % O2 Device: None (Room air)    Weight: 95 lbs 0 oz    General Appearance: NAD, laying comfortably in bed, nonverbal at baseline  Respiratory: CTAB, breathing non-labored on room air  Cardiovascular: RRR, no murmur noted  GI: soft, non-tender to palpation, non-distended, bowel sounds active  Extremities: no bilateral peripheral edema   Neuro: nonverbal at baseline, nonfocal neuro findings    Medical Decision Making       Please see A&P for additional details of medical decision making.      Data     I have personally reviewed the following data over the past 24 hrs:    11.4 (H)  \   11.7   / 407     137 102 16.0 /  161 (H)   3.9 24 0.48 (L) \     ALT: <5 AST: 10 AP: 72 TBILI: 0.2   ALB: 3.4 (L) TOT PROTEIN: 7.6 LIPASE: N/A       Imaging results reviewed over the past 24 hrs:   No results found for this or any previous visit (from the past 24 hour(s)).

## 2024-03-03 NOTE — PROVIDER NOTIFICATION
Notified MARY CABALLERO around 1010    ED 21 SR  Abscess culture just resulted final results   4+ Escherichia coli Abnormal   2+ Gram negative bacilli  3+ WBC seen  Eloisa Ibarra RN

## 2024-03-03 NOTE — PROGRESS NOTES
[facility] ID Service: Follow Up Note      Patient:  Robi Remy, Date of birth 1985, Medical record number [id number]  Date of Visit:  March 3, 2024         Assessment and Recommendations:   ID Problem List:  Right perinephric abscess  S/p IR aspiration 3/1  Culture with pan-susceptible E coli  Complicated UTI  Ucx (2/29) with pan-susceptible E coli  Neurogenic bladder with Mitrofanoff     Recommendations:  Can de-escalate to PO TMP-SMX 1DS BID  Would treat for a further 2 weeks  Avoiding fluoroquinolones due to long Q-T syndrome and prolonged QTc (498 on 3/1)  Will continue to monitor blood cultures colleted 3/1 - no growth to date  If any positive, please obtain repeat blood cultures x 2 sets     Discussion:  39yo F with h/o spastic quadriplegia and developmental delay, neurogenic bladder with Mitrofanoff, long Q-T syndrome who present on 2/29 with decrease PO x 1 week per caregiver (pt is nonverbal), found to have complicated UTI and right perinephric abscess, now s/p aspiration by IR on 3/1. Ucx with growth of pan-susceptible E coli, and abscess cultures with the same.     My suspicion is that the source of the perinephric abscess is an ascending UTI. Pt is nonverbal and thus difficult to discern subtle changes in symptoms. Her poor PO intake has been noticeable for a week, so certainly possible mild UTI one week ago progressed and ascended, resulting in this abscess. Greatly appreciate IR aspirating and culturing this, should help speed recovery and aid in appropriate antibiotic selection.     Given same pan-susceptible E coli growth in abscess, we can de-escalate from IV ceftriaxone to PO TMP-SMX and plan for a further 2 weeks of therapy.    Serjio Wilde MD  Division of Infectious Diseases and International Medicine  P: [phone]        Interval History:     Seen and examined, pt's caretaker at bedside. More awake and interactive today. No acute events overnight.         Review of  Systems:     Full 9 pt ROS obtained and negative unless noted above in assessment and interval history.         Current Antimicrobials     Ceftriaxone         Physical Exam:   Ranges for vital signs:  Temp:  [97.3  F (36.3  C)-98.1  F (36.7  C)] 98.1  F (36.7  C)  Pulse:  [79-90] 90  Resp:  [18] 18  BP: (103-122)/(62-86) 122/62  SpO2:  [96 %-97 %] 96 %    Intake/Output Summary (Last 24 hours) at 3/3/2024 1356  Last data filed at 3/3/2024 0800  Gross per 24 hour   Intake 240 ml   Output 200 ml   Net 40 ml     Exam:  GENERAL:  well-developed, well-nourished, sitting in bed in no acute distress. Nonverbal.  ENT:  Head is normocephalic, atraumatic. Oropharynx is moist without exudates or ulcers.  EYES:  Eyes have anicteric sclerae.    NECK:  Supple.  LUNGS:  Clear to auscultation.  CARDIOVASCULAR:  Regular rate and rhythm with no murmurs, gallops or rubs.  ABDOMEN:  Normal bowel sounds, soft, no apparent signs of TTP. Mitrofanoff site very healthy and clean.  EXT: Extremities warm and without edema.  SKIN:  No acute rashes.    NEUROLOGIC:  Baseline spastic quadriplegia, nonverbal.          Laboratory Data:   Reviewed.  Pertinent for:    Microbiology:  Culture   Date Value Ref Range Status   03/01/2024 4+ Escherichia coli (A)  Final   03/01/2024 No anaerobic organisms isolated after 2 days  Preliminary   03/01/2024 No growth after 2 days  Preliminary   03/01/2024 No growth after 2 days  Preliminary   02/29/2024 >100,000 CFU/mL Escherichia coli (A)  Final   02/03/2023 >100,000 CFU/mL Mixture of urogenital messi  Final     Metabolic Studies       Recent Labs   Lab Test 03/03/24  0750 03/02/24  0713 03/01/24  0654 02/29/24  2212 02/05/23  0656 02/04/23  0538 02/03/23  0942    137 137 135 144 142  --    POTASSIUM 3.9 4.6 4.6 4.6 3.0* 3.8  --    CHLORIDE 102 102 102 98 116* 110*  --    CO2 24 25 23 28 19* 22  --    ANIONGAP 11 10 12 9 9 10  --    BUN 16.0 12.8 15.5 17.8 10.7 20.2*  --    CR 0.48* 0.42* 0.38* 0.43* 0.40*  0.45*  --    GFRESTIMATED >90 >90 >90 >90 >90 >90  --    * 90 95 102* 86 118*  --    BRYAN 9.2 9.3 9.7 9.9 7.6* 8.4*  --    MAG  --   --   --  2.1  --   --   --    LACT  --   --   --   --   --   --  0.9     Hepatic Studies    Recent Labs   Lab Test 03/03/24  0750 03/02/24  0713 02/29/24 2212 02/05/23  0656 02/04/23  0538 02/03/23  0211   BILITOTAL 0.2 0.2 0.3 0.3 0.4 0.5   ALKPHOS 72 76 84 46 56 89   ALBUMIN 3.4* 3.5 3.9 2.5* 3.4* 4.6   AST 10 12 14 19 30 24   ALT <5 <5 11 5* 12 18     Pancreatitis testing    Recent Labs   Lab Test 02/03/23  0211   LIPASE 11*     Hematology Studies      Recent Labs   Lab Test 03/03/24  0500 03/02/24  0713 03/01/24  0654 02/29/24 2212 02/05/23  0656 02/04/23  0538   WBC 11.4* 10.8 15.0* 17.3* 10.1 9.1   HGB 11.7 12.1 11.2* 12.1 10.6* 12.6   HCT 36.6 36.7 35.0 37.9 33.8* 40.0    363 378 376 226 260     Urine Studies     Recent Labs   Lab Test 02/29/24 2219 02/03/23  0153   URINEPH 6.0 6.5   NITRITE Positive* Negative   LEUKEST Moderate* Large*   WBCU 59* >182*         Latest Ref Rng & Units 3/3/2024     7:50 AM 3/3/2024     5:00 AM 3/2/2024     7:13 AM 3/1/2024     6:54 AM 2/29/2024    10:12 PM   Transplant Immunosuppression Labs   Creat 0.51 - 0.95 mg/dL 0.48   0.42  0.38  0.43    Urea Nitrogen 6.0 - 20.0 mg/dL 16.0   12.8  15.5  17.8    WBC 4.0 - 11.0 10e3/uL  11.4  10.8  15.0  17.3    Neutrophil %  72  72  72  77           Imaging:   CT Abdomen Pelvis w Contrast  Result Date: 3/1/2024  IMPRESSION: 1.  Complex fluid collection along the anterior margin of the right kidney suggestive of abscess. There is adjacent wall thickening of the colon concerning for colitis. 2.  Bladder is under distended. Wall thickening/cystitis not excluded.    XR Abdomen 2 Views  Result Date: 2/29/2024  IMPRESSION: Rightward convexity curvature of the spine centered on L1. There are paraspinous rods with cerclage wires for scoliotic correction. IUD in the central pelvis. Nonobstructive bowel  gas pattern. No abnormal intra-abdominal calcifications. No pneumatosis or pneumoperitoneum. Left hip dysplasia with chronic. Left hip is normally aligned. Dislocation of the humeral head which is superior to the acetabulum

## 2024-03-04 ENCOUNTER — TELEPHONE (OUTPATIENT)
Dept: UROLOGY | Facility: CLINIC | Age: 39
End: 2024-03-04
Payer: MEDICARE

## 2024-03-04 VITALS
HEART RATE: 88 BPM | TEMPERATURE: 97.9 F | SYSTOLIC BLOOD PRESSURE: 87 MMHG | OXYGEN SATURATION: 98 % | DIASTOLIC BLOOD PRESSURE: 63 MMHG | WEIGHT: 95 LBS | HEIGHT: 58 IN | BODY MASS INDEX: 19.94 KG/M2 | RESPIRATION RATE: 16 BRPM

## 2024-03-04 LAB
ALBUMIN SERPL BCG-MCNC: 3.6 G/DL (ref 3.5–5.2)
ALP SERPL-CCNC: 78 U/L (ref 40–150)
ALT SERPL W P-5'-P-CCNC: <5 U/L (ref 0–50)
ANION GAP SERPL CALCULATED.3IONS-SCNC: 9 MMOL/L (ref 7–15)
AST SERPL W P-5'-P-CCNC: 14 U/L (ref 0–45)
BASOPHILS # BLD AUTO: 0 10E3/UL (ref 0–0.2)
BASOPHILS NFR BLD AUTO: 0 %
BILIRUB SERPL-MCNC: 0.2 MG/DL
BUN SERPL-MCNC: 10.5 MG/DL (ref 6–20)
CALCIUM SERPL-MCNC: 9.4 MG/DL (ref 8.6–10)
CHLORIDE SERPL-SCNC: 102 MMOL/L (ref 98–107)
CREAT SERPL-MCNC: 0.47 MG/DL (ref 0.51–0.95)
DEPRECATED HCO3 PLAS-SCNC: 26 MMOL/L (ref 22–29)
EGFRCR SERPLBLD CKD-EPI 2021: >90 ML/MIN/1.73M2
EOSINOPHIL # BLD AUTO: 0.2 10E3/UL (ref 0–0.7)
EOSINOPHIL NFR BLD AUTO: 2 %
ERYTHROCYTE [DISTWIDTH] IN BLOOD BY AUTOMATED COUNT: 12.6 % (ref 10–15)
GLUCOSE SERPL-MCNC: 158 MG/DL (ref 70–99)
HCT VFR BLD AUTO: 38.8 % (ref 35–47)
HGB BLD-MCNC: 12.8 G/DL (ref 11.7–15.7)
IMM GRANULOCYTES # BLD: 0.1 10E3/UL
IMM GRANULOCYTES NFR BLD: 0 %
LYMPHOCYTES # BLD AUTO: 1.8 10E3/UL (ref 0.8–5.3)
LYMPHOCYTES NFR BLD AUTO: 14 %
MCH RBC QN AUTO: 27.5 PG (ref 26.5–33)
MCHC RBC AUTO-ENTMCNC: 33 G/DL (ref 31.5–36.5)
MCV RBC AUTO: 83 FL (ref 78–100)
MONOCYTES # BLD AUTO: 0.9 10E3/UL (ref 0–1.3)
MONOCYTES NFR BLD AUTO: 6 %
NEUTROPHILS # BLD AUTO: 10.4 10E3/UL (ref 1.6–8.3)
NEUTROPHILS NFR BLD AUTO: 78 %
NRBC # BLD AUTO: 0 10E3/UL
NRBC BLD AUTO-RTO: 0 /100
PLATELET # BLD AUTO: 451 10E3/UL (ref 150–450)
POTASSIUM SERPL-SCNC: 3.9 MMOL/L (ref 3.4–5.3)
PROT SERPL-MCNC: 7.9 G/DL (ref 6.4–8.3)
RBC # BLD AUTO: 4.66 10E6/UL (ref 3.8–5.2)
SODIUM SERPL-SCNC: 137 MMOL/L (ref 135–145)
VZV IGG SER QL IA: 64.4 INDEX
VZV IGG SER QL IA: NORMAL
WBC # BLD AUTO: 13.4 10E3/UL (ref 4–11)

## 2024-03-04 PROCEDURE — 99239 HOSP IP/OBS DSCHRG MGMT >30: CPT | Mod: GC | Performed by: INTERNAL MEDICINE

## 2024-03-04 PROCEDURE — 250N000013 HC RX MED GY IP 250 OP 250 PS 637

## 2024-03-04 PROCEDURE — 36415 COLL VENOUS BLD VENIPUNCTURE: CPT

## 2024-03-04 PROCEDURE — 36415 COLL VENOUS BLD VENIPUNCTURE: CPT | Performed by: STUDENT IN AN ORGANIZED HEALTH CARE EDUCATION/TRAINING PROGRAM

## 2024-03-04 PROCEDURE — 82040 ASSAY OF SERUM ALBUMIN: CPT

## 2024-03-04 PROCEDURE — 86787 VARICELLA-ZOSTER ANTIBODY: CPT | Performed by: STUDENT IN AN ORGANIZED HEALTH CARE EDUCATION/TRAINING PROGRAM

## 2024-03-04 PROCEDURE — 85004 AUTOMATED DIFF WBC COUNT: CPT

## 2024-03-04 RX ORDER — SULFAMETHOXAZOLE/TRIMETHOPRIM 800-160 MG
1 TABLET ORAL 2 TIMES DAILY
Qty: 26 TABLET | Refills: 0 | Status: SHIPPED | OUTPATIENT
Start: 2024-03-04 | End: 2024-03-17

## 2024-03-04 RX ADMIN — SULFAMETHOXAZOLE AND TRIMETHOPRIM 1 TABLET: 800; 160 TABLET ORAL at 06:17

## 2024-03-04 RX ADMIN — NADOLOL 20 MG: 20 TABLET ORAL at 08:45

## 2024-03-04 RX ADMIN — GLYCOPYRROLATE 4 MG: 1 TABLET ORAL at 06:18

## 2024-03-04 ASSESSMENT — ACTIVITIES OF DAILY LIVING (ADL)
ADLS_ACUITY_SCORE: 57
ADLS_ACUITY_SCORE: 53
DEPENDENT_IADLS:: CLEANING;COOKING;LAUNDRY;SHOPPING;MEAL PREPARATION;MEDICATION MANAGEMENT;MONEY MANAGEMENT;TRANSPORTATION;INCONTINENCE
ADLS_ACUITY_SCORE: 57
ADLS_ACUITY_SCORE: 53
ADLS_ACUITY_SCORE: 57

## 2024-03-04 NOTE — PHARMACY-ADMISSION MEDICATION HISTORY
"Pharmacist Admission Medication History    Admission medication history is complete. The information provided in this note is only as accurate as the sources available at the time of the update.    Information Source(s): Family member, Hospital records, and CareEverywhere/SureScripts via in-person    Pertinent Information: Spoke with guardian (Aileen) who is an accurate historian. Aileen mentioned that her dose of glycopyrrolate recently changed from 4mg TID to BID. She also mentioned that due to insurance issues, the ketoconazole can only be filled monthly so can only do twice weekly shampoos.     Changes made to PTA medication list:  Deleted: Senna-docusate as Aileen said that she does not take it.  Changed: glycopyrrolate changed to 4mg BID from TID    Allergies reviewed with patient and updates made in EHR: Reviewed allergies with Aileen and she mentioned that after her procedure on 3/1, her abdomen broke out in a rash due to a medication that \"sounds like lidocaine but not lidocaine\". Nursing notes from around the procedure do not mention any skin issues and only lidocaine was used during this procedure. Not adding to allergies at this time.    Medication History Completed By: Jose Poe [facility] 3/4/2024 9:34 AM    PTA Med List   Medication Sig Last Dose    docusate sodium (COLACE) 100 MG capsule Take 1 capsule (100 mg) by mouth 2 times daily Past Week    glycopyrrolate (GLYCATE) 2 MG tablet Take 4 mg by mouth 2 times daily Past Week    ketoconazole (NIZORAL) 2 % external shampoo Apply topically twice a week Past Week    Multiple Vitamins-Iron (MULTIVITAMIN/IRON PO) Take 1 tablet by mouth every morning Past Week    nadolol (CORGARD) 20 MG tablet Take 1 tablet by mouth every morning Past Week    polyethylene glycol (MIRALAX) 17 GM/Dose powder Take 17 g by mouth 2 times daily Past Week      "
None

## 2024-03-04 NOTE — TELEPHONE ENCOUNTER
Scheduled urology follow up per message received and warm transferred to imaging dept to schedule CT

## 2024-03-04 NOTE — CARE PLAN
Admitted/transferred from:   2 RN Full  skin assessment completed by Dona Babcock, RN and Edmund.  Skin assessment finding: Raised pink rash over all abdomen with concentrated rash on lower R flank. Heels pink, blanchable.   Interventions/actions: Pillow between bony prominences.     Bedside Emergency Equipment Present:  Suction Regulator: YES  Suction Canister: YES  Tubing between Regulator and Canister: YES  O2 Regulator with Tree: YES  Ambu Bag: YES

## 2024-03-04 NOTE — CONSULTS
Care Management Initial Consult    General Information  Assessment completed with: Caregiver, Parents,    Type of CM/SW Visit: Initial Assessment    Primary Care Provider verified and updated as needed: Yes   Readmission within the last 30 days: no previous admission in last 30 days      Reason for Consult: discharge planning  Advance Care Planning: Advance Care Planning Reviewed: present on chart          Communication Assessment  Patient's communication style: spoken language (English or Bilingual)    Hearing Difficulty or Deaf: no   Wear Glasses or Blind: no    Cognitive  Cognitive/Neuro/Behavioral: .WDL except, speech, orientation, level of consciousness, mood/behavior  Level of Consciousness: alert  Arousal Level: opens eyes spontaneously  Orientation: other (see comments)  Mood/Behavior: calm  Best Language: 3 - Mute  Speech: unable to speak    Living Environment:   People in home:  (Stays 3 days with Mom, 2 days with Dad, and 2 days with Respite Care, Neisha)     Current living Arrangements: house      Able to return to prior arrangements: yes       Family/Social Support:  Care provided by: parent(s)  Provides care for: no one, unable/limited ability to care for self  Marital Status: Single  Parent(s)          Description of Support System: Supportive, Involved    Support Assessment: Adequate family and caregiver support, Adequate social supports    Current Resources:   Patient receiving home care services: No     Community Resources: [facility]  Equipment currently used at home: wheelchair, manual  Supplies currently used at home: Incontinence Supplies (catheter supplies from [facility])    Employment/Financial:  Employment Status: disabled        Financial Concerns: none           Does the patient's insurance plan have a 3 day qualifying hospital stay waiver?  No    Lifestyle & Psychosocial Needs:  Social Determinants of Health     Food Insecurity: Not on file   Depression: Not on file   Housing  Stability: Not on file   Tobacco Use: Low Risk  (2/27/2024)    Patient History     Smoking Tobacco Use: Never     Smokeless Tobacco Use: Never     Passive Exposure: Never   Financial Resource Strain: Not on file   Alcohol Use: Not on file   Transportation Needs: Not on file   Physical Activity: Not on file   Interpersonal Safety: Not on file   Stress: Not on file   Social Connections: Not on file       Functional Status:  Prior to admission patient needed assistance:   Dependent ADLs:: Wheelchair-with assist, Transfers, Incontinence, Grooming, Eating, Dressing, Bathing  Dependent IADLs:: Cleaning, Cooking, Laundry, Shopping, Meal Preparation, Medication Management, Money Management, Transportation, Incontinence  Assesssment of Functional Status: At functional baseline    Mental Health Status:  Mental Health Status: No Current Concerns       Chemical Dependency Status:  Chemical Dependency Status: No Current Concerns             Values/Beliefs:  Spiritual, Cultural Beliefs, Taoism Practices, Values that affect care: no               Additional Information:  Patient is a 38-year-old female with past medical history notable for severe cerebral palsy--nonverbal & with spastic quadriplegia (wheeelchair bound), neurogenic bladder status post Mitrofanoff, remote hx of recurrent UTI/pyelo & long QT syndrome admitted for UTI & further management of right sided perinephric abscess found on CT.      Met with patient and Mom, Aileen, at bedside to introduce RNCC role and discuss discharge planning.  Patients Mom and Dad are co guardians of Robi.  They are .  Patient lives at 3 different homes throughout the week.  She lives with Mom at her home 3 days/week, Dad's house 2 days/week, and Respite Care, Neisha Rojo, house 2 days/week.  Neisha has known the patient since she was a child and her and her  are like a second set of parents to Robi.  Aileen reports that they all work together to make sure Robi is well  cared for.  Patient goes to a day program called [employer] in [place] 5 days/week for 5 hours/day.  She does not have any other in home services or supports.  Pt has a manual w/c, but no other equipment.  She is able to stand and pivot with assistance.  Aileen does not anticipate any new needs at discharge.  She will provide transport to home in their w/c accessible van at discharge.  RNCC will remain available if further needs arise.            Re Rizzo, RNCC  Phone: [phone]  Pager: [phone]  7B Med Surg Vocera  Nurse Coordinator      Social Work and Care Management Department       SEARCHABLE in [facility] - search CARE COORDINATOR       [place] ([phone]) Saturday & Sunday; ([phone]) [facility] Recognized Holidays     Units: 5A Onc Vocera & 5C Vocera Pager: [phone]    Units: 6B Vocera & 6C Vocera  Pager: [phone]    Units: 7A SOT RNCC Vocera, 7B Med Surg Vocera, & 7C Med Surg Vocera  Pager: [phone]    Units: 6A Vocera & 4A CVICU Vocera, 4C MICU Vocera, and 4E SICU Vocera   Pager: [phone]    Units: 5 Ortho Vocera & 5 Med Surg Vocera  Pager: [phone]    Units: 6 Med Surg Vocera & 8 Med Surg Vocera  Pager [phone]            07-Mar-2023 03:21

## 2024-03-04 NOTE — PLAN OF CARE
"BP 98/69 (BP Location: Left arm, Patient Position: Right side, Cuff Size: Adult Regular)   Pulse 90   Temp 98.2  F (36.8  C) (Axillary)   Resp 18   Ht 1.473 m (4' 10\")   Wt 43.1 kg (95 lb)   SpO2 95%   BMI 19.86 kg/m      Status: Perinephric abscess, drained 3/1  Activity: Ax2, lift for transfers.  Neuros: A&Ox4, difficult to assess; nonverbal at baseline, mother able to anticipate needs.  Cardiac: WDL, no chest pain indicated.  Respiratory: WDL on RA, no SOB indicated.  GI/: +BS, +BM 3/3, AUOP via mitrofanoff straight caths; mother does cath cares with patient.  Diet: Tolerating pureed diet with thin liquids; total assist required for eating. Takes pills whole with food.  Skin/Incisions: WDL ex abdominal rash localized near abdominal IR puncture site. Unchanged this shift.  Lines/Drains: R PIV SL.  Pain/Nausea: No pain or nausea indicated.  New Changes: No acute changes this shift; mother at bedside, helpful in all cares.  "

## 2024-03-04 NOTE — PLAN OF CARE
Goal Outcome Evaluation:  Arrived on 7B approximately 1800. Alert, non-verbal. Reported as quadraplegic but move left arm. Cooperative with cares. PIV sites covered with Coban to prevent patient removing. Skin check done. VSS. Mother present and she says that she has been doing Q 4 hour bladder caths and Mitrofanoff flushes. Mother will be staying over-night. Because patient was exposed to chickenpox recently she was put in Special Precaution Isolation.

## 2024-03-04 NOTE — PLAN OF CARE
"BP (!) 87/63 (BP Location: Left arm)   Pulse 88   Temp 97.9  F (36.6  C) (Axillary)   Resp 16   Ht 1.473 m (4' 10\")   Wt 43.1 kg (95 lb)   SpO2 98%   BMI 19.86 kg/m       Problem: Adult Inpatient Plan of Care  Goal: Plan of Care Review  Description: The Plan of Care Review/Shift note should be completed every shift.  The Outcome Evaluation is a brief statement about your assessment that the patient is improving, declining, or no change.  This information will be displayed automatically on your shift  note.  Outcome: Progressing     Goal Outcome Evaluation: Pt. Nonverbal at baseline, mother able to make needs known. Soft Bps otherwise vitally stable on RA. Ax2 w/ lift, pt up in personal chair x2 today. Mitrofanoff in place- mother straight caths pt. Pureed diet w/ thin liquids- tolerating. Total assist to feed- mother assisted this shift. Scattered rash on abd & right forearm- pending varicella zoster PCR results. R PIV SL. No nausea/ c/o pain.       "

## 2024-03-04 NOTE — PLAN OF CARE
Goal Outcome Evaluation:      Plan of Care Reviewed With: patient, guardian    Overall Patient Progress: improvingOverall Patient Progress: improving    Outcome Evaluation: AVS reivewed with patient's mom.  discharge education provied, mom verbalized understanding discharge education materiasl. pt left with mom with all her belongings.

## 2024-03-04 NOTE — PLAN OF CARE
Goal Outcome Evaluation:      Plan of Care Reviewed With: caregiver, parent, patient    Overall Patient Progress: no changeOverall Patient Progress: no change    Outcome Evaluation: Anticipate discharge to home with family    RALPH Orantes  Phone: [phone]  Pager: [phone]  7B Med Surg Vocera  Nurse Coordinator      Social Work and Care Management Department       SEARCHABLE in [facility] - search CARE COORDINATOR       [place] ([phone]) Saturday & Sunday; ([phone]) [facility] Recognized Holidays     Units: 5A Onc Vocera & 5C Vocera Pager: [phone]    Units: 6B Vocera & 6C Vocera  Pager: [phone]    Units: 7A SOT RNCC Vocera, 7B Med Surg Vocera, & 7C Med Surg Vocera  Pager: [phone]    Units: 6A Vocera & 4A CVICU Vocera, 4C MICU Vocera, and 4E SICU Vocera   Pager: [phone]    Units: 5 Ortho Vocera & 5 Med Surg Vocera  Pager: [phone]    Units: 6 Med Surg Vocera & 8 Med Surg Vocera  Pager [phone]

## 2024-03-05 LAB — VZV IGM SER IA-ACNC: 0.2 ISR

## 2024-03-06 LAB
BACTERIA BLD CULT: NO GROWTH
BACTERIA BLD CULT: NO GROWTH

## 2024-03-08 LAB — BACTERIA ABSC ANAEROBE+AEROBE CULT: NORMAL

## 2024-03-12 NOTE — ANESTHESIA PREPROCEDURE EVALUATION
Anesthesia Pre-Procedure Evaluation    Patient: Robi Remy   MRN: [id number] : 1985        Procedure : Procedure(s):  Bilateral dental exam, Dental radiograph, dental restorations, pulpotomy, root canal therapy, frenectomy, gingivectomy, Alveoloplasty, periodontal therapy, fluoride, varnish in the mouth, dental extractions  PAP Smear  Exam Under Anesthesia Breast          Past Medical History:   Diagnosis Date    Cerebral palsy (H)     Developmental delay     Long Q-T syndrome     takes nadolol    Neurogenic bladder     Nonverbal     Self-catheterizes urinary bladder     Parents Cath patient    Spastic quadriplegia (H)       Past Surgical History:   Procedure Laterality Date    BACK SURGERY      spine fusion    EXAM UNDER ANESTHESIA DENTAL      EXAM UNDER ANESTHESIA PELVIC N/A 2018    Procedure: EXAM UNDER ANESTHESIA PELVIC;  Pap Smear, Breast exam, Mirena IUD removal and replacement.  Dental Exam, Radiographs, Dental Restorations, Periodontal Therapy, Dental Extractions, Biopsies   ;  Surgeon: Briseida Iyer MD;  Location: [facility] OR    EXAM UNDER ANESTHESIA, RESTORATIONS, EXTRACTION(S) DENTAL, COMBINED N/A 2018    Procedure: COMBINED EXAM UNDER ANESTHESIA, RESTORATIONS, EXTRACTION(S) DENTAL;  Dental exam, x- rays, periodontal therapy and flouride varnish;  Surgeon: Ara Urbano DDS;  Location: [facility] OR    IR RETROPERITONEAL ABSCESS DRAINAGE  3/1/2024    LAPAROSCOPIC MITROFANOFF PROCEDURE (APPENDIX CONDUIT) N/A 2014    Procedure: LAPAROSCOPIC MITROFANOFF PROCEDURE (APPENDIX CONDUIT);  Surgeon: Naren Bustos MD;  [facility]    LAPAROSCOPIC REPAIR BLADDER N/A 2014    Procedure: LAPAROSCOPIC REPAIR BLADDER;  Surgeon: Naren Bustos MD;  [facility]    REPLACE INTRAUTERINE DEVICE N/A 2018    Procedure: REPLACE INTRAUTERINE DEVICE;  Mirena IUD removal and replacement;  Surgeon: Briseida Iyer MD;  Location: [facility] OR      Allergies    Allergen Reactions    Tape [Adhesive Tape]      Skin blistering  Tape had been on for one month without changing. Unsure what tape      Social History     Tobacco Use    Smoking status: Never     Passive exposure: Never    Smokeless tobacco: Never   Substance Use Topics    Alcohol use: No      Wt Readings from Last 1 Encounters:   03/01/24 43.1 kg (95 lb)        Anesthesia Evaluation            ROS/MED HX  ENT/Pulmonary:       Neurologic: Comment: Cerebral palsy - severe  Non-verbal  Wheelchair      Cardiovascular: Comment: Long Q-T syndrome      METS/Exercise Tolerance:     Hematologic:       Musculoskeletal:       GI/Hepatic:       Renal/Genitourinary: Comment: Neurogenic bladder      Endo:       Psychiatric/Substance Use:       Infectious Disease:       Malignancy:       Other:            Physical Exam    Airway   unable to assess          Respiratory Devices and Support         Dental    unable to assess        Cardiovascular   cardiovascular exam normal          Pulmonary   pulmonary exam normal                OUTSIDE LABS:  CBC:   Lab Results   Component Value Date    WBC 13.4 (H) 03/04/2024    WBC 11.4 (H) 03/03/2024    HGB 12.8 03/04/2024    HGB 11.7 03/03/2024    HCT 38.8 03/04/2024    HCT 36.6 03/03/2024     (H) 03/04/2024     03/03/2024     BMP:   Lab Results   Component Value Date     03/04/2024     03/03/2024    POTASSIUM 3.9 03/04/2024    POTASSIUM 3.9 03/03/2024    CHLORIDE 102 03/04/2024    CHLORIDE 102 03/03/2024    CO2 26 03/04/2024    CO2 24 03/03/2024    BUN 10.5 03/04/2024    BUN 16.0 03/03/2024    CR 0.47 (L) 03/04/2024    CR 0.48 (L) 03/03/2024     (H) 03/04/2024     (H) 03/03/2024     COAGS:   Lab Results   Component Value Date    INR 1.37 (H) 03/01/2024     POC:   Lab Results   Component Value Date     (H) 09/07/2014    HCG Negative 02/29/2024    HCGS Negative 02/03/2023     HEPATIC:   Lab Results   Component Value Date    ALBUMIN 3.6  03/04/2024    PROTTOTAL 7.9 03/04/2024    ALT <5 03/04/2024    AST 14 03/04/2024    ALKPHOS 78 03/04/2024    BILITOTAL 0.2 03/04/2024     OTHER:   Lab Results   Component Value Date    LACT 0.9 02/03/2023    BRYAN 9.4 03/04/2024    MAG 2.1 02/29/2024    LIPASE 11 (L) 02/03/2023       Anesthesia Plan    ASA Status:  3    NPO Status:  NPO Appropriate    Anesthesia Type: General.     - Airway: ETT   Induction: Intravenous, Propofol.   Maintenance: Balanced.        Consents    Anesthesia Plan(s) and associated risks, benefits, and realistic alternatives discussed. Questions answered and patient/representative(s) expressed understanding.     - Discussed: Risks, Benefits and Alternatives for the PROCEDURE were discussed     - Discussed with:  Parent (Mother and/or Father)            Postoperative Care    Pain management: Oral pain medications, IV analgesics, Multi-modal analgesia.   PONV prophylaxis: Dexamethasone or Solumedrol, Background Propofol Infusion     Comments:               Theo Quiroga MD    I have reviewed the pertinent notes and labs in the chart from the past 30 days and (re)examined the patient.  Any updates or changes from those notes are reflected in this note.     # Hyponatremia: Lowest Na = 135 mmol/L in last 30 days, will monitor as appropriate      # Hypoalbuminemia: Lowest albumin = 3.4 g/dL in the past 30 days , will monitor as appropriate   # Coagulation Defect: INR = 1.37 (Ref range: 0.85 - 1.15) and/or PTT = N/A, will monitor for bleeding

## 2024-03-13 ENCOUNTER — ANESTHESIA (OUTPATIENT)
Dept: SURGERY | Facility: CLINIC | Age: 39
End: 2024-03-13
Payer: MEDICARE

## 2024-03-13 ENCOUNTER — HOSPITAL ENCOUNTER (OUTPATIENT)
Facility: CLINIC | Age: 39
Discharge: HOME | End: 2024-03-13
Attending: DENTIST | Admitting: DENTIST
Payer: MEDICARE

## 2024-03-13 VITALS
TEMPERATURE: 97.5 F | HEART RATE: 84 BPM | WEIGHT: 87 LBS | DIASTOLIC BLOOD PRESSURE: 85 MMHG | RESPIRATION RATE: 15 BRPM | OXYGEN SATURATION: 96 % | HEIGHT: 58 IN | BODY MASS INDEX: 18.26 KG/M2 | SYSTOLIC BLOOD PRESSURE: 127 MMHG

## 2024-03-13 LAB
ANION GAP SERPL CALCULATED.3IONS-SCNC: 8 MMOL/L (ref 7–15)
BUN SERPL-MCNC: 24.6 MG/DL (ref 6–20)
CALCIUM SERPL-MCNC: 9.7 MG/DL (ref 8.6–10)
CHLORIDE SERPL-SCNC: 99 MMOL/L (ref 98–107)
CREAT SERPL-MCNC: 0.6 MG/DL (ref 0.51–0.95)
DEPRECATED HCO3 PLAS-SCNC: 26 MMOL/L (ref 22–29)
EGFRCR SERPLBLD CKD-EPI 2021: >90 ML/MIN/1.73M2
GLUCOSE SERPL-MCNC: 89 MG/DL (ref 70–99)
HGB BLD-MCNC: 12.5 G/DL (ref 11.7–15.7)
POTASSIUM SERPL-SCNC: 4.5 MMOL/L (ref 3.4–5.3)
SODIUM SERPL-SCNC: 133 MMOL/L (ref 135–145)

## 2024-03-13 PROCEDURE — 41899 UNLISTED PX DENTALVLR STRUX: CPT | Performed by: NURSE ANESTHETIST, CERTIFIED REGISTERED

## 2024-03-13 PROCEDURE — 58301 REMOVE INTRAUTERINE DEVICE: CPT | Performed by: OBSTETRICS & GYNECOLOGY

## 2024-03-13 PROCEDURE — 710N000010 HC RECOVERY PHASE 1, LEVEL 2, PER MIN: Performed by: DENTIST

## 2024-03-13 PROCEDURE — 250N000025 HC SEVOFLURANE, PER MIN: Performed by: DENTIST

## 2024-03-13 PROCEDURE — 36415 COLL VENOUS BLD VENIPUNCTURE: CPT | Performed by: NURSE PRACTITIONER

## 2024-03-13 PROCEDURE — 250N000011 HC RX IP 250 OP 636: Mod: GY | Performed by: OBSTETRICS & GYNECOLOGY

## 2024-03-13 PROCEDURE — 250N000011 HC RX IP 250 OP 636: Performed by: ANESTHESIOLOGY

## 2024-03-13 PROCEDURE — 85018 HEMOGLOBIN: CPT | Performed by: NURSE PRACTITIONER

## 2024-03-13 PROCEDURE — 250N000011 HC RX IP 250 OP 636

## 2024-03-13 PROCEDURE — G0145 SCR C/V CYTO,THINLAYER,RESCR: HCPCS | Performed by: OBSTETRICS & GYNECOLOGY

## 2024-03-13 PROCEDURE — 710N000012 HC RECOVERY PHASE 2, PER MINUTE: Performed by: DENTIST

## 2024-03-13 PROCEDURE — 41899 UNLISTED PX DENTALVLR STRUX: CPT | Performed by: ANESTHESIOLOGY

## 2024-03-13 PROCEDURE — 999N000141 HC STATISTIC PRE-PROCEDURE NURSING ASSESSMENT: Performed by: DENTIST

## 2024-03-13 PROCEDURE — 58300 INSERT INTRAUTERINE DEVICE: CPT | Performed by: OBSTETRICS & GYNECOLOGY

## 2024-03-13 PROCEDURE — 250N000009 HC RX 250: Performed by: ANESTHESIOLOGY

## 2024-03-13 PROCEDURE — 250N000013 HC RX MED GY IP 250 OP 250 PS 637: Performed by: DENTIST

## 2024-03-13 PROCEDURE — 370N000017 HC ANESTHESIA TECHNICAL FEE, PER MIN: Performed by: DENTIST

## 2024-03-13 PROCEDURE — 360N000075 HC SURGERY LEVEL 2, PER MIN: Performed by: DENTIST

## 2024-03-13 PROCEDURE — 258N000003 HC RX IP 258 OP 636: Performed by: ANESTHESIOLOGY

## 2024-03-13 PROCEDURE — 80048 BASIC METABOLIC PNL TOTAL CA: CPT | Performed by: NURSE PRACTITIONER

## 2024-03-13 PROCEDURE — 250N000009 HC RX 250: Performed by: DENTIST

## 2024-03-13 PROCEDURE — 87624 HPV HI-RISK TYP POOLED RSLT: CPT | Performed by: OBSTETRICS & GYNECOLOGY

## 2024-03-13 DEVICE — IMPLANTABLE DEVICE: Type: IMPLANTABLE DEVICE | Site: UTERUS | Status: FUNCTIONAL

## 2024-03-13 RX ORDER — NALOXONE HYDROCHLORIDE 0.4 MG/ML
0.1 INJECTION, SOLUTION INTRAMUSCULAR; INTRAVENOUS; SUBCUTANEOUS
Status: CANCELLED | OUTPATIENT
Start: 2024-03-13

## 2024-03-13 RX ORDER — FENTANYL CITRATE 50 UG/ML
25 INJECTION, SOLUTION INTRAMUSCULAR; INTRAVENOUS EVERY 5 MIN PRN
Status: DISCONTINUED | OUTPATIENT
Start: 2024-03-13 | End: 2024-03-13 | Stop reason: HOSPADM

## 2024-03-13 RX ORDER — FENTANYL CITRATE 50 UG/ML
50 INJECTION, SOLUTION INTRAMUSCULAR; INTRAVENOUS EVERY 5 MIN PRN
Status: DISCONTINUED | OUTPATIENT
Start: 2024-03-13 | End: 2024-03-13 | Stop reason: HOSPADM

## 2024-03-13 RX ORDER — KETOROLAC TROMETHAMINE 30 MG/ML
INJECTION, SOLUTION INTRAMUSCULAR; INTRAVENOUS PRN
Status: DISCONTINUED | OUTPATIENT
Start: 2024-03-13 | End: 2024-03-13

## 2024-03-13 RX ORDER — ONDANSETRON 4 MG/1
4 TABLET, ORALLY DISINTEGRATING ORAL EVERY 30 MIN PRN
Status: DISCONTINUED | OUTPATIENT
Start: 2024-03-13 | End: 2024-03-13 | Stop reason: HOSPADM

## 2024-03-13 RX ORDER — SODIUM CHLORIDE, SODIUM LACTATE, POTASSIUM CHLORIDE, CALCIUM CHLORIDE 600; 310; 30; 20 MG/100ML; MG/100ML; MG/100ML; MG/100ML
INJECTION, SOLUTION INTRAVENOUS CONTINUOUS PRN
Status: DISCONTINUED | OUTPATIENT
Start: 2024-03-13 | End: 2024-03-13

## 2024-03-13 RX ORDER — ACETAMINOPHEN 160 MG
TABLET,DISINTEGRATING ORAL PRN
Status: DISCONTINUED | OUTPATIENT
Start: 2024-03-13 | End: 2024-03-13 | Stop reason: HOSPADM

## 2024-03-13 RX ORDER — OXYCODONE HYDROCHLORIDE 10 MG/1
10 TABLET ORAL
Status: CANCELLED | OUTPATIENT
Start: 2024-03-13

## 2024-03-13 RX ORDER — ONDANSETRON 2 MG/ML
4 INJECTION INTRAMUSCULAR; INTRAVENOUS EVERY 30 MIN PRN
Status: CANCELLED | OUTPATIENT
Start: 2024-03-13

## 2024-03-13 RX ORDER — FENTANYL CITRATE 50 UG/ML
INJECTION, SOLUTION INTRAMUSCULAR; INTRAVENOUS PRN
Status: DISCONTINUED | OUTPATIENT
Start: 2024-03-13 | End: 2024-03-13

## 2024-03-13 RX ORDER — ONDANSETRON 2 MG/ML
4 INJECTION INTRAMUSCULAR; INTRAVENOUS EVERY 30 MIN PRN
Status: DISCONTINUED | OUTPATIENT
Start: 2024-03-13 | End: 2024-03-13 | Stop reason: HOSPADM

## 2024-03-13 RX ORDER — LIDOCAINE HYDROCHLORIDE 20 MG/ML
INJECTION, SOLUTION INFILTRATION; PERINEURAL PRN
Status: DISCONTINUED | OUTPATIENT
Start: 2024-03-13 | End: 2024-03-13

## 2024-03-13 RX ORDER — ONDANSETRON 4 MG/1
4 TABLET, ORALLY DISINTEGRATING ORAL EVERY 30 MIN PRN
Status: CANCELLED | OUTPATIENT
Start: 2024-03-13

## 2024-03-13 RX ORDER — LIDOCAINE HYDROCHLORIDE AND EPINEPHRINE BITARTRATE 20; .01 MG/ML; MG/ML
INJECTION, SOLUTION SUBCUTANEOUS PRN
Status: DISCONTINUED | OUTPATIENT
Start: 2024-03-13 | End: 2024-03-13 | Stop reason: HOSPADM

## 2024-03-13 RX ORDER — SODIUM CHLORIDE, SODIUM LACTATE, POTASSIUM CHLORIDE, CALCIUM CHLORIDE 600; 310; 30; 20 MG/100ML; MG/100ML; MG/100ML; MG/100ML
INJECTION, SOLUTION INTRAVENOUS CONTINUOUS
Status: DISCONTINUED | OUTPATIENT
Start: 2024-03-13 | End: 2024-03-13 | Stop reason: HOSPADM

## 2024-03-13 RX ORDER — DEXAMETHASONE SODIUM PHOSPHATE 4 MG/ML
4 INJECTION, SOLUTION INTRA-ARTICULAR; INTRALESIONAL; INTRAMUSCULAR; INTRAVENOUS; SOFT TISSUE
Status: CANCELLED | OUTPATIENT
Start: 2024-03-13

## 2024-03-13 RX ORDER — LIDOCAINE 40 MG/G
CREAM TOPICAL
Status: CANCELLED | OUTPATIENT
Start: 2024-03-13

## 2024-03-13 RX ORDER — DEXAMETHASONE SODIUM PHOSPHATE 4 MG/ML
INJECTION, SOLUTION INTRA-ARTICULAR; INTRALESIONAL; INTRAMUSCULAR; INTRAVENOUS; SOFT TISSUE PRN
Status: DISCONTINUED | OUTPATIENT
Start: 2024-03-13 | End: 2024-03-13

## 2024-03-13 RX ORDER — NALOXONE HYDROCHLORIDE 0.4 MG/ML
0.1 INJECTION, SOLUTION INTRAMUSCULAR; INTRAVENOUS; SUBCUTANEOUS
Status: DISCONTINUED | OUTPATIENT
Start: 2024-03-13 | End: 2024-03-13 | Stop reason: HOSPADM

## 2024-03-13 RX ORDER — HYDROMORPHONE HYDROCHLORIDE 1 MG/ML
0.4 INJECTION, SOLUTION INTRAMUSCULAR; INTRAVENOUS; SUBCUTANEOUS EVERY 5 MIN PRN
Status: DISCONTINUED | OUTPATIENT
Start: 2024-03-13 | End: 2024-03-13 | Stop reason: HOSPADM

## 2024-03-13 RX ORDER — CHLORHEXIDINE GLUCONATE ORAL RINSE 1.2 MG/ML
SOLUTION DENTAL PRN
Status: DISCONTINUED | OUTPATIENT
Start: 2024-03-13 | End: 2024-03-13 | Stop reason: HOSPADM

## 2024-03-13 RX ORDER — HYDROMORPHONE HYDROCHLORIDE 1 MG/ML
0.2 INJECTION, SOLUTION INTRAMUSCULAR; INTRAVENOUS; SUBCUTANEOUS EVERY 5 MIN PRN
Status: DISCONTINUED | OUTPATIENT
Start: 2024-03-13 | End: 2024-03-13 | Stop reason: HOSPADM

## 2024-03-13 RX ORDER — PROPOFOL 10 MG/ML
INJECTION, EMULSION INTRAVENOUS PRN
Status: DISCONTINUED | OUTPATIENT
Start: 2024-03-13 | End: 2024-03-13

## 2024-03-13 RX ORDER — OXYCODONE HYDROCHLORIDE 5 MG/1
5 TABLET ORAL
Status: CANCELLED | OUTPATIENT
Start: 2024-03-13

## 2024-03-13 RX ORDER — FENTANYL CITRATE 50 UG/ML
25 INJECTION, SOLUTION INTRAMUSCULAR; INTRAVENOUS
Status: CANCELLED | OUTPATIENT
Start: 2024-03-13

## 2024-03-13 RX ORDER — OXYMETAZOLINE HYDROCHLORIDE 0.05 G/100ML
SPRAY NASAL PRN
Status: DISCONTINUED | OUTPATIENT
Start: 2024-03-13 | End: 2024-03-13

## 2024-03-13 RX ORDER — SODIUM CHLORIDE, SODIUM LACTATE, POTASSIUM CHLORIDE, CALCIUM CHLORIDE 600; 310; 30; 20 MG/100ML; MG/100ML; MG/100ML; MG/100ML
INJECTION, SOLUTION INTRAVENOUS CONTINUOUS
Status: CANCELLED | OUTPATIENT
Start: 2024-03-13

## 2024-03-13 RX ADMIN — OXYMETAZOLINE HYDROCHLORIDE 2 SPRAY: 0.05 SPRAY NASAL at 07:58

## 2024-03-13 RX ADMIN — LIDOCAINE HYDROCHLORIDE 80 MG: 20 INJECTION, SOLUTION INFILTRATION; PERINEURAL at 07:57

## 2024-03-13 RX ADMIN — PROPOFOL 30 MCG/KG/MIN: 10 INJECTION, EMULSION INTRAVENOUS at 08:30

## 2024-03-13 RX ADMIN — Medication 50 MG: at 07:58

## 2024-03-13 RX ADMIN — FENTANYL CITRATE 100 MCG: 50 INJECTION INTRAMUSCULAR; INTRAVENOUS at 07:57

## 2024-03-13 RX ADMIN — SODIUM CHLORIDE, POTASSIUM CHLORIDE, SODIUM LACTATE AND CALCIUM CHLORIDE: 600; 310; 30; 20 INJECTION, SOLUTION INTRAVENOUS at 07:44

## 2024-03-13 RX ADMIN — PROPOFOL 50 MG: 10 INJECTION, EMULSION INTRAVENOUS at 08:00

## 2024-03-13 RX ADMIN — MIDAZOLAM 2 MG: 1 INJECTION INTRAMUSCULAR; INTRAVENOUS at 07:35

## 2024-03-13 RX ADMIN — SUGAMMADEX 100 MG: 100 INJECTION, SOLUTION INTRAVENOUS at 10:18

## 2024-03-13 RX ADMIN — PROPOFOL 150 MG: 10 INJECTION, EMULSION INTRAVENOUS at 07:57

## 2024-03-13 RX ADMIN — DEXAMETHASONE SODIUM PHOSPHATE 6 MG: 4 INJECTION, SOLUTION INTRA-ARTICULAR; INTRALESIONAL; INTRAMUSCULAR; INTRAVENOUS; SOFT TISSUE at 08:06

## 2024-03-13 RX ADMIN — SUGAMMADEX 100 MG: 100 INJECTION, SOLUTION INTRAVENOUS at 10:28

## 2024-03-13 RX ADMIN — KETOROLAC TROMETHAMINE 30 MG: 30 INJECTION, SOLUTION INTRAMUSCULAR at 10:00

## 2024-03-13 ASSESSMENT — ACTIVITIES OF DAILY LIVING (ADL)
ADLS_ACUITY_SCORE: 41
ADLS_ACUITY_SCORE: 42
ADLS_ACUITY_SCORE: 42
ADLS_ACUITY_SCORE: 37
ADLS_ACUITY_SCORE: 41

## 2024-03-13 NOTE — DISCHARGE INSTRUCTIONS
Same-Day Surgery   Adult Discharge Orders & Instructions     For 24 hours after surgery:  Get plenty of rest.  A responsible adult must stay with you for at least 24 hours after you leave the hospital.   Pain medication can slow your reflexes. Do not drive or use heavy equipment.  If you have weakness or tingling, don't drive or use heavy equipment until this feeling goes away.  Mixing alcohol and pain medication can cause dizziness and slow your breathing. It can even be fatal. Do not drink alcohol while taking pain medication.  Avoid strenuous or risky activities.  Ask for help when climbing stairs.   You may feel lightheaded.  If so, sit for a few minutes before standing.  Have someone help you get up.   If you have nausea (feel sick to your stomach), drink only clear liquids such as apple juice, ginger ale, broth or 7-Up.  Rest may also help.  Be sure to drink enough fluids.  Move to a regular diet as you feel able. Take pain medications with a small amount of solid food, such as toast or crackers, to avoid nausea.   A slight fever is normal. Call the doctor if your fever is over 100 F (37.7 C) (taken under the tongue) or lasts longer than 24 hours.  You may have a dry mouth, muscle aches, trouble sleeping or a sore throat.  These symptoms should go away after 24 hours.  Do not make important or legal decisions.      Call your doctor for any of the followin.  Signs of infection (fever, growing tenderness at the surgery site, severe pain, a large amount of drainage or bleeding, foul-smelling drainage, redness, swelling).    2.  It has been over 8 to 10 hours since surgery and you are still not able to urinate (pee).    3.  Headache for over 24 hours.    4.  Numbness, tingling or weakness the day after surgery (if you had spinal anesthesia).  To contact a doctor, call the clinic [phone]    or:  ' [phone] and ask for the Resident On Call for: Dental/ Oral Surgery (answered 24 hours a day)  '    Emergency Department:  [facility]: [phone]  [place] [facility]: [phone]        Last dose of Tylenol NA. Can get a dose whenever  Last dose of NSAID (toradol or ibuprofen) given at 1030. Next dose due at  4:30 PM.  Continue to alternate Tylenol and ibuprofen every 3 hours as needed for comfort.

## 2024-03-13 NOTE — OP NOTE
[facility] GYN Surgery Operative Note     Pre-operative diagnosis: Desires Pap/HPV, IUD replacement, having dental work done requiring GETA   Post-operative diagnosis: Same   Procedure: EAU, breast exam, pelvic exam, pap/HPV testing, removal and replacement of new Mirena IUD    Surgeon: Matilda Alcantar MD   Assistant(s): None   Anesthesia: General endotracheal anesthesia   Estimated blood loss: None   Total IV fluids: (See anesthesia record)   Blood transfusion: No transfusion was given during surgery   Total urine output: None   Drains: None   Specimens: PAP/HPV   Implants: Mirena IUD Lot # [id number], exp April 2026   Findings: Normal b/l breast exam without masses or lymphadenopathy. No nipple discharge. Normal external genitalia.  Cervix normal appearing. IUD strings 2 cm from os.  Uterus sounded to 6 cm   Complications: None   Condition: Stable   Comments: Consent was obtained from patient's parents.  Patient was brought to the operating room where she was given a general anesthetic.  She was then placed in the dorsolithotomy position.  A timeout was completed.  Breast exam was completed bilaterally with the above-mentioned findings.  Pelvic exam was completed with no masses and mobile uterus.  Speculum was inserted and the cervix easily visualized.  Pap smear was collected.  IUD strings were then grasped and with minimal resistance the IUD was removed.  It regained typical resting shape immediately.  Betadine solution was used to cleanse the cervix.  Single-tooth tenaculum was placed on the anterior lip of the cervix.  The uterus sounded to 6 cm.  IUD was deployed in the usual fashion at the fundus.  Strings were cut to 2 cm.  Tenaculum was removed from the cervix.  Hemostasis was appreciated.  All instruments were removed from the vagina.  At this point the case was turned over to the dental team.  Sponge needle and lap counts were correct for this portion of the procedure.    Matilda  MD Ortiz, FACOG  (she/her/hers)    Department of Ob/Gyn/Women's Health  [employer] [facility] [employer]  [facility]  [address] [place] [address]  [email or web address]  cristina [phone]  fAlejandra [phone]  3/13/2024  8:50 AM

## 2024-03-13 NOTE — ANESTHESIA CARE TRANSFER NOTE
Patient: Robi Remy    Procedure: Procedure(s):  Bilateral dental exam, Dental radiograph, periodontal therapy, fluoride, varnish in the mouth, dental extractions x 5  PAP Smear Mirena IUD removal and replacement  Exam Under Anesthesia Breast       Diagnosis: Dental caries [K02.9]  Dental infection [K04.7]  Diagnosis Additional Information: No value filed.    Anesthesia Type:   General     Note:    Oropharynx: 4x4 Gauze placed by oral surgeon for bleeding.  Level of Consciousness: drowsy  Oxygen Supplementation: face mask  Level of Supplemental Oxygen (L/min / FiO2): 8  Independent Airway: airway patency satisfactory and stable  Dentition: dentition unchanged S/P dental procedure  Vital Signs Stable: post-procedure vital signs reviewed and stable  Report to RN Given: handoff report given  Patient transferred to: PACU    Handoff Report: Identifed the Patient, Identified the Reponsible Provider, Reviewed the pertinent medical history, Discussed the surgical course, Reviewed Intra-OP anesthesia mangement and issues during anesthesia, Set expectations for post-procedure period and Allowed opportunity for questions and acknowledgement of understanding      Vitals:  Vitals Value Taken Time   /83 03/13/24 1040   Temp 36.2  C (97.2  F) 03/13/24 1040   Pulse 70 03/13/24 1041   Resp 10 03/13/24 1041   SpO2 100 % 03/13/24 1041   Vitals shown include unfiled device data.    Electronically Signed By: Shalonda Sullvian  March 13, 2024  10:42 AM

## 2024-03-13 NOTE — OP NOTE
[facility]   OR Dental Procedure Note    Patient:   Robi Remy    Date of birth 1985   MRN:  [id number]   Date of Visit:  03/13/2024   Date of Admission 3/13/2024     Treatment Completed   General Anesthesia Start:  Robi Remy is a 38 year old female brought into the operating room and draped in the usual customary [facility] fashion. Following the time-out procedures, the patient was placed under general anesthesia care via Left Naris.    Under GA, the following treatments were performed:   Bilateral dental examination,   FMX radiographs were taken and reviewed.  Moist throat pack was placed, betadine applied circumferentially to oral cavity.   Pre-procedural rinse included: Chlorhexidine 0.12% solution followed by a 50/50 solution of sterile saline and hydrogen peroxide.    Periodontal Treatment: Full mouth Scaling and root planing : Bulk debridement completed with the Cavitron, followed use of hand scalers as necessary. Slow speed polish with medium grit polishing paste. All contacts flossed.      Local Anesthetic:  3.4_cc  Lidocaine 2% w/ epi 1:100,000  Given via infiltration on the Right Maxilla, Left Maxilla, and Left mandible    Extractions of teeth #4-upper right 2nd premolar, #13--upper left 2nd premolar, #14--upper left 1st molar, #15-upper left 2nd molar, and #19-lower left 1st molar  Elevated gingiva with #9 periosteal elevator. Luxated tooth with series of straight elevators. Tooth removed completely with dental forceps. Digital compression of cortical bone performed. Surgicel placed in the extraction site. 3-0 chromic gut suture placed. Gauze hemostasis achieved by way of pressure.    Sodium Fluoride Varnish 5% placed on remaining teeth.     Throat pack placed at: 0900  Throat pack removed at: 1008  The oropharynx was inspected and found to be clear.   Estimated blood loss: 10 (mL)      Dental procedure Finish:  After  the dental procedure, the patient was transferred to recovery room in good condition under the lead of the CRNA.The patient s family was informed by the dental team about the dental findings and procedures performed. Verbal and written post-op instructions given. All questions and concerns were invited and answered, patient and patient's family left pleased with treatment.       Clinic contact information:   [facility]  [facility]  [address] [place] [id number]  Phone:[phone]    Pre - Procedure   Patient name: Robi Remy  : 1985  Medical record number:  [id number]  Dental procedures performed on: 3/13/2024  It was deemed necessary for this patient to be seen in the hospital operating room because pt is not able to be seen in clinic due to: Developmental Delays    Pre-procedure with patient & guardian:   Consent: Risks complications including but not limited to post-operative pain, swelling, bleeding, infection, temporary/permanent paresthesia/anesthesia of CN V3, lingual nerve, failure to resolve chief complaint. Patient's guardian(s) agreed to procedure as written and signed consent after all questions were invited and answered.    Providers     Dental attending:Jannie Ferguson DDS, [employer]  Dental resident Yanet Price MD, DMD, Fellow  Dental resident/student:  Anesthesiologist:Theo Quiroga MD   CRNA:Dilcia Herman APRN CRNA, Chelita Torres APRN CRNA SRNA Nagel, Alexandra   Scrub RN: Leyla Jade   Circulator RN: Namrata Thomson RN       Patient review   Patient Health history: History is obtained from the patient's parent(s)  The 10 point Review of Systems is negative other than noted in the HPI and pertinent information mentioned above.     History of Present Illness:  Robi Remy is a 38 year old female who presents to the OR for comprehensive dental  treatment.     ASA 2 - Patient with mild systemic disease with no functional limitations    Physical Exam:  Vitals were reviewed  Temp: 98.1  F (36.7  C) Temp src: Axillary BP: 106/70 Pulse: 76   Resp: 20 SpO2: 98 % O2 Device: None (Room air)      Medical, Surgical, Social History       Past Medical History:   Diagnosis Date    Cerebral palsy (H)     Developmental delay     Long Q-T syndrome     takes nadolol    Neurogenic bladder     Nonverbal     Self-catheterizes urinary bladder     Parents Cath patient    Spastic quadriplegia (H)          Past Surgical History:   Procedure Laterality Date    BACK SURGERY  2002    spine fusion    EXAM UNDER ANESTHESIA DENTAL  2010    EXAM UNDER ANESTHESIA PELVIC N/A 1/4/2018    Procedure: EXAM UNDER ANESTHESIA PELVIC;  Pap Smear, Breast exam, Mirena IUD removal and replacement.  Dental Exam, Radiographs, Dental Restorations, Periodontal Therapy, Dental Extractions, Biopsies   ;  Surgeon: Briseida Iyer MD;  Location: [facility] OR    EXAM UNDER ANESTHESIA, RESTORATIONS, EXTRACTION(S) DENTAL, COMBINED N/A 1/4/2018    Procedure: COMBINED EXAM UNDER ANESTHESIA, RESTORATIONS, EXTRACTION(S) DENTAL;  Dental exam, x- rays, periodontal therapy and flouride varnish;  Surgeon: Ara Urbano DDS;  Location: [facility] OR    IR RETROPERITONEAL ABSCESS DRAINAGE  3/1/2024    LAPAROSCOPIC MITROFANOFF PROCEDURE (APPENDIX CONDUIT) N/A 9/4/2014    Procedure: LAPAROSCOPIC MITROFANOFF PROCEDURE (APPENDIX CONDUIT);  Surgeon: Naren Bustos MD;  [facility]: [facility] OR    LAPAROSCOPIC REPAIR BLADDER N/A 9/4/2014    Procedure: LAPAROSCOPIC REPAIR BLADDER;  Surgeon: Naren Bustos MD;  [facility] OR    REPLACE INTRAUTERINE DEVICE N/A 1/4/2018    Procedure: REPLACE INTRAUTERINE DEVICE;  Mirena IUD removal and replacement;  Surgeon: Briseida Iyer MD;  Location: [facility] OR         Social History     Tobacco Use    Smoking status: Never     Passive exposure: Never    Smokeless tobacco: Never    Substance Use Topics    Alcohol use: No         Family History   Problem Relation Age of Onset    Heart Disease Father         long QT syndrome     Anesthesia Reaction No family hx of     Thrombosis No family hx of          Immunizations and Allergies     Immunization History   Administered Date(s) Administered    COVID-19 12+ (2023-24) (Pfizer) 11/27/2023    COVID-19 Bivalent 12+ (Pfizer) 10/14/2022    COVID-19 MONOVALENT 12+ (Pfizer) 03/22/2021, 04/12/2021, 10/12/2021         Allergies   Allergen Reactions    Tape [Adhesive Tape]      Skin blistering  Tape had been on for one month without changing. Unsure what tape         Medication     Prior to Admission Medications       Current Facility-Administered Medications Ordered in Epic   Medication Dose Route Frequency Last Rate Last Admin    chlorhexidine (PERIDEX) 0.12 % solution    PRN   15 mL at 03/13/24 0930    dexAMETHasone (DECADRON) injection   Intravenous PRN   6 mg at 03/13/24 0806    fentaNYL (PF) (SUBLIMAZE) injection 25 mcg  25 mcg Intravenous Q5 Min PRN        fentaNYL (PF) (SUBLIMAZE) injection 50 mcg  50 mcg Intravenous Q5 Min PRN        fentaNYL (PF) (SUBLIMAZE) injection   Intravenous PRN   100 mcg at 03/13/24 0757    hydrogen peroxide 3 % solution    PRN   60 mL at 03/13/24 1010    HYDROmorphone (PF) (DILAUDID) injection 0.2 mg  0.2 mg Intravenous Q5 Min PRN        HYDROmorphone (PF) (DILAUDID) injection 0.4 mg  0.4 mg Intravenous Q5 Min PRN        lactated ringers infusion   Intravenous Continuous        lactated ringers infusion   Intravenous Continuous PRN   New Bag at 03/13/24 0744    levonorgestrel (MIRENA) 20 MCG/24HR IUD    PRN   1 each at 01/12/18 0800    lidocaine 2% injection (MDV)   Intravenous PRN   80 mg at 03/13/24 0757    lidocaine 2%-EPINEPHrine 1:100,000 2 %-1:[id number] injection    PRN   1.7 mL at 03/13/24 0951    midazolam (VERSED) injection   Intravenous PRN   2 mg at 03/13/24 0735    naloxone (NARCAN) injection 0.1 mg  0.1 mg  Intravenous Q2 Min PRN        ondansetron (ZOFRAN ODT) ODT tab 4 mg  4 mg Oral Q30 Min PRN        Or    ondansetron (ZOFRAN) injection 4 mg  4 mg Intravenous Q30 Min PRN        prochlorperazine (COMPAZINE) injection 5 mg  5 mg Intravenous Q6H PRN        propofol (DIPRIVAN) injection 10 mg/mL vial   Intravenous PRN   30 mcg/kg/min at 03/13/24 0830    rocuronium injection   Intravenous PRN   50 mg at 03/13/24 0758    sugammadex (BRIDION) injection   Intravenous PRN   100 mg at 03/13/24 1028     No current [facility]-ordered outpatient medications on file.         Exam and Assessment    Robi Remy is a 38 year old female that presented to the OR at [facility] due to Pre-procedure diagnosis: Chronic periodontitis, unspecified K05.30 and Chronic gingivitis; plaque induced, K05.10    Extra-Oral:  No submandibular lymphadenopathy, no induration or erythema, no abnormal skin lesions, inferior border of mandible is palpable bilaterally, HANY >45mm. No physical impediment from TMJ bilaterally. No clicking of TMJ on opening and lateral movements.    Intraoral exam: Reveals heavy plaque, heavy calculus, heavy bleeding on probing, clinical decay seen on none, 9mm pockets on #s 4, 13,14,15. 7mm on #11. G3 mobility on #4,13. Grade 2 mobility on #29.  Generalized grade 1 mobility. Mallampati Class I (visualization of the soft palate, fauces, uvula, anterior and posterior pillars). Generalized wear on posterior and anterior.   No abnormal soft tissue findings upon intraoral examination    Probing depth range (mm):  Upper right: 2-7  Upper left: 2-9  Lower left: 2-9  Lower right: 2-6    Radiographic exam: Radiographs revealed Periradicular radiolucent lesions associated with teeth #4-upper right 2nd premolar and #13--upper left 2nd premolar, no coronal radiolucent lesions  on teeth ,  radiopacities on teeth  consistent with restorations on teeth,  severe RBL noted   Abnormal findings include: 12 month rc.     The  post-operative diagnosis was Same as pre-operative diagnosis

## 2024-03-13 NOTE — PROGRESS NOTES
[facility] GYN Surgery Brief Operative Note    Pre-operative diagnosis: Desires Pap/HPV, IUD replacement, having dental work done requiring GETA   Post-operative diagnosis: Same   Procedure: EAU, breast exam, pelvic exam, pap/HPV testing, removal and replacement of new Mirena IUD    Surgeon: Matilda Alcantar MD   Assistant(s): None   Anesthesia: General endotracheal anesthesia   Estimated blood loss: None   Total IV fluids: (See anesthesia record)   Blood transfusion: No transfusion was given during surgery   Total urine output: None   Drains: None   Specimens: PAP/HPV   Implants: Mirena IUD Lot # [id number], exp April 2026   Findings: Normal b/l breast exam without masses or lymphadenopathy. No nipple discharge. Normal external genitalia.  Cervix normal appearing. IUD strings 2 cm from os.  Uterus sounded to 6 cm   Complications: None   Condition: Stable   Comments: See dictated operative report for full details       Matilda Alcantar MD, FACOG  (she/her/hers)    Department of Ob/Gyn/Women's Health  [facility]  [facility]  [address] [place] [address]. [place] [address]  [email or web address]  p. [phone]  f. [phone]

## 2024-03-13 NOTE — BRIEF OP NOTE
[facility]    Brief Operative Note    Pre-operative diagnosis: Dental caries [K02.9]  Dental infection [K04.7]  Post-operative diagnosis Generalized advanced Periodontitis    Procedure: Bilateral dental exam, Dental radiograph, periodontal therapy, fluoride, varnish in the mouth, dental extractions x 5, Bilateral - Mouth  PAP Smear Mirena IUD removal and replacement, N/A - Vagina  Exam Under Anesthesia Breast, N/A - Breast    Surgeon: Surgeon(s) and Role:  Panel 1:     * Jannie Ferguson DDS - Primary     * Yanet Price MD - Assisting  Panel 2:     * Matilda Alcantar MD - Primary  Panel 3:     * Matilda Alcantar MD - Primary  Anesthesia: General   Estimated Blood Loss: 10 ml    Drains: None  Specimens:   ID Type Source Tests Collected by Time Destination   1 :  Brushing Cervix HPV HIGH RISK TYPES DNA CERVICAL, GYNECOLOGIC CYTOLOGY Matilda Alcantar MD 3/13/2024  8:09 AM      Findings:   None.  Complications: None.  Implants:   Implant Name Type Inv. Item Serial No.  Lot No. LRB No. Used Action   IUD CONTRACEPTIVE DEVICE MIRENA [id number] Contraceptive Device IUD CONTRACEPTIVE DEVICE MIRENA [id number]  RHETT [id number] N/A 1 Explanted   IUD CONTRACEPTIVE DEVICE MIRENA    [employer] [id number] N/A 1 Implanted

## 2024-03-13 NOTE — ANESTHESIA POSTPROCEDURE EVALUATION
Patient: Robi Remy    Procedure: Procedure(s):  Bilateral dental exam, Dental radiograph, periodontal therapy, fluoride, varnish in the mouth, dental extractions x 5  PAP Smear Mirena IUD removal and replacement  Exam Under Anesthesia Breast       Anesthesia Type:  General    Note:  Disposition: Outpatient   Postop Pain Control: Uneventful            Sign Out: Well controlled pain   PONV: No   Neuro/Psych: Uneventful            Sign Out: Acceptable/Baseline neuro status   Airway/Respiratory: Uneventful            Sign Out: Acceptable/Baseline resp. status   CV/Hemodynamics: Uneventful            Sign Out: Acceptable CV status; No obvious hypovolemia; No obvious fluid overload   Other NRE: NONE   DID A NON-ROUTINE EVENT OCCUR?            Last vitals:  Vitals Value Taken Time   /87 03/13/24 1100   Temp 36.2  C (97.2  F) 03/13/24 1040   Pulse 85 03/13/24 1103   Resp 11 03/13/24 1103   SpO2 97 % 03/13/24 1103   Vitals shown include unfiled device data.    Electronically Signed By: Theo Quiroga MD  March 13, 2024  11:03 AM

## 2024-03-13 NOTE — ANESTHESIA PROCEDURE NOTES
Airway       Patient location during procedure: OR       Procedure Start/Stop Times: 3/13/2024 8:01 AM  Staff -        Performed By: FREDERICK  Consent for Airway        Urgency: elective  Indications and Patient Condition       Indications for airway management: taj-procedural       Induction type:intravenous       Mask difficulty assessment: 1 - vent by mask    Final Airway Details       Final airway type: endotracheal airway       Successful airway: Nasal and JC  Endotracheal Airway Details        ETT size (mm): 6.5       Cuffed: yes       Successful intubation technique: video laryngoscopy       VL Blade Size: MAC 3       Grade View of Cords: 1       Adjucts: magill forceps       Position: Center       Measured from: lips       Secured at (cm): 27       Bite block used: None    Post intubation assessment        Placement verified by: capnometry, equal breath sounds and chest rise        Number of attempts at approach: 1       Secured with: tape       Ease of procedure: easy       Dentition: Intact and Unchanged    Medication(s) Administered   Medication Administration Time: 3/13/2024 8:01 AM    Additional Comments       Nasal dilation prior to intubation w/ nasal airway 30 and 32

## 2024-03-18 LAB
BKR LAB AP GYN ADEQUACY: NORMAL
BKR LAB AP GYN INTERPRETATION: NORMAL
PATH REPORT.COMMENTS IMP SPEC: NORMAL
PATH REPORT.COMMENTS IMP SPEC: NORMAL

## 2024-03-19 LAB
HUMAN PAPILLOMA VIRUS 16 DNA: NEGATIVE
HUMAN PAPILLOMA VIRUS 18 DNA: NEGATIVE
HUMAN PAPILLOMA VIRUS FINAL DIAGNOSIS: NORMAL
HUMAN PAPILLOMA VIRUS OTHER HR: NEGATIVE

## 2024-03-20 NOTE — TELEPHONE ENCOUNTER
MEDICAL RECORDS REQUEST   [facility]  Urology Clinic  [address] [place] [address]  PHONE: [phone]  Fax: [phone]        FUTURE VISIT INFORMATION                                                   Robi Remy, : 1985 scheduled for future visit at [facility]    APPOINTMENT INFORMATION:  Date: 2024  Provider:  Teresa Ramachandran PA-C  Reason for Visit/Diagnosis: Hospital follow up      RECORDS REQUESTED FOR VISIT                                                     NOTES  STATUS/DETAILS   DISCHARGE SUMMARY from hospital  YES, 2024 -- [facility]    DISCHARGE REPORT from the ER  YES, 2024 -- [facility] ED   MEDICATION LIST  yes   LABS     URINALYSIS (UA)  yes   IMAGES  yes, 2024 -- IR RETROPERITONEAL   2024 -- CT ABD PELVIS  2024 -- XR ABD  2023 -- CT ABD PELVIS       PRE-VISIT CHECKLIST      Joint diagnostic appointment coordinated correctly          (ensure right order & amount of time) Yes   RECORD COLLECTION COMPLETE Yes

## 2024-04-01 ENCOUNTER — HOSPITAL ENCOUNTER (OUTPATIENT)
Dept: CT IMAGING | Facility: CLINIC | Age: 39
Discharge: HOME | End: 2024-04-01
Admitting: PHYSICIAN ASSISTANT
Payer: MEDICARE

## 2024-04-01 DIAGNOSIS — N15.1 PERINEPHRIC ABSCESS: ICD-10-CM

## 2024-04-01 PROCEDURE — 250N000011 HC RX IP 250 OP 636

## 2024-04-01 PROCEDURE — G1010 CDSM STANSON: HCPCS

## 2024-04-01 PROCEDURE — 74177 CT ABD & PELVIS W/CONTRAST: CPT | Mod: MG

## 2024-04-01 RX ORDER — IOPAMIDOL 755 MG/ML
75 INJECTION, SOLUTION INTRAVASCULAR ONCE
Status: COMPLETED | OUTPATIENT
Start: 2024-04-01 | End: 2024-04-01

## 2024-04-01 RX ADMIN — IOPAMIDOL 75 ML: 755 INJECTION, SOLUTION INTRAVENOUS at 15:41

## 2024-04-02 ENCOUNTER — VIRTUAL VISIT (OUTPATIENT)
Dept: UROLOGY | Facility: CLINIC | Age: 39
End: 2024-04-02
Payer: MEDICARE

## 2024-04-02 ENCOUNTER — PRE VISIT (OUTPATIENT)
Dept: UROLOGY | Facility: CLINIC | Age: 39
End: 2024-04-02

## 2024-04-02 DIAGNOSIS — N15.1 PERINEPHRIC ABSCESS: Primary | ICD-10-CM

## 2024-04-02 DIAGNOSIS — N31.9 NEUROGENIC BLADDER: ICD-10-CM

## 2024-04-02 PROCEDURE — 99203 OFFICE O/P NEW LOW 30 MIN: CPT | Mod: 95 | Performed by: PHYSICIAN ASSISTANT

## 2024-04-02 NOTE — PATIENT INSTRUCTIONS
UROLOGY CLINIC VISIT PATIENT INSTRUCTIONS    Follow up with [facility] going forward.    If you have any issues, questions or concerns in the meantime, do not hesitate to contact us at [phone] or via [employer].     It was a pleasure meeting with you today.  Thank you for allowing me and my team the privilege of caring for you today.  YOU are the reason we are here, and I truly hope we provided you with the excellent service you deserve.  Please let us know if there is anything else we can do for you so that we can be sure you are leaving completely satisfied with your care experience.

## 2024-04-02 NOTE — LETTER
4/2/2024       RE: Robi Remy  [address] [facility] [address]     Dear Colleague,    Thank you for referring your patient, Robi Remy, to the [facility] [place] at [facility]. Please see a copy of my visit note below.    Urology Virtual Visit - Follow Up    Reason for visit: follow up perinephric abscess    HPI: Robi Remy is a 38 year old female with neurogenic bladder secondary to Cerebral Palsy s/p ileocecalcystoplasty in 2014 (Dr. Bustos). Robi is non-verbal and is accompanied by her mother, Aileen, today who provides the entire history. Robi is normally followed by urology at [facility]. She was recently hospitalized at [facility] for UTI with perinephric abscess secondary to E coli infection. The following relevant excerpt from the discharge summary on 3/4/2024 was reviewed:     #UTI and perinephric abscess secondary to E. Coli infection, abscess likely caused by ascending UTI from delayed recognition of UTI given her history of nonverbal severe cerebral palsy   #History of excellent cares of neurogenic bladder s/p mitrofanoff  Patient presented to the ED with concerns of decreased PO intake per her caregivers. Lab findings indicated leukocytosis and UA was positive for leukocyte esterase and nitrites. Has been hospitalized in the past with recurrent UTIs and pyelonephritis. CT with evidence of right sided perinephric abscess. IR consulted and completed aspiration of the perinephric abscess. Started on ceftriaxone, but then transitioned to Bactrim once culture grew pansensitive E. Coli. Unsure etiology of abscess has no recent UTIs and receives excellent care via Mitrofanoff by her parents and caregiver, though is possible UTI developed and then ascended to kidney given family reports of ~1 week to 10 days of decreasing PO intake, which was abnormal for her and may have indicated  the start of an infection.   - continue Bactrim BID   - follow up CT in 3-4 weeks to monitor resolution of right perinephric abscess       TODAY   4/2/2024:  Aileen reports that Robi seems to be doing well. Her appetite is slowly improving. She did have 5 teeth pulled several days after her discharge from the hospital, so this may account for why she is slow to resume normal eating patterns.   They are catheterizing Robi 4-6 times per day. Catheterized volumes never exceed 500 mL. Total daily urine output in the range of 800-1300 mL. Robi drinks mainly water and milk. There has been no gross hematuria or other obvious signs/symptoms of UTI.   Her mother shares that she has been having very small bowel movements and worries that she may be constipated. Prior to her hospitalization, she was having regular, formed bowel movements every day with a regimen of Dulcolax + Miralax. They have continued on this regimen, but bowels are not as regular. Aileen worries that this could also be causing Robi not to consume as much PO.     PEx  GENERAL: alert and no distress  EYES: Eyes grossly normal to inspection.  No discharge or erythema, or obvious scleral/conjunctival abnormalities.  RESP: No audible wheeze, cough, or visible cyanosis.    SKIN: Visible skin clear. No significant rash, abnormal pigmentation or lesions.  NEURO: Cranial nerves grossly intact.     LAB  Creatinine   Date Value Ref Range Status   03/13/2024 0.60 0.51 - 0.95 mg/dL Final   09/07/2014 0.52 0.52 - 1.04 mg/dL Final       Lab Results   Component Value Date    CULTURE 4+ Escherichia coli 03/01/2024    CULTURE No anaerobic organisms isolated 03/01/2024    CULTURE No Growth 03/01/2024    CULTURE No Growth 03/01/2024    CULTURE >100,000 CFU/mL Escherichia coli 02/29/2024       IMAGING:   EXAM: CT ABDOMEN PELVIS W CONTRAST  LOCATION: [facility]  DATE: 4/1/2024     INDICATION: History of renal abscess status post drainage  COMPARISON:  03/01/2024  TECHNIQUE: CT scan of the abdomen and pelvis was performed following injection of IV contrast. Multiplanar reformats were obtained. Dose reduction techniques were used.  CONTRAST: Isovue 370 75 mL     FINDINGS:   LOWER CHEST: Normal.     HEPATOBILIARY: Normal.     PANCREAS: Normal.     SPLEEN: Normal.     ADRENAL GLANDS: Normal.     KIDNEYS/BLADDER: Previous complex perinephric abscess anterior to the midpole the right kidney appears collapsed and now appears as a lobulated, thick rimmed, fibrous capsule measuring 2.6 x 1.2 cm on series 6 image 36, with trace internal fluid.   Multiple bilateral renal cystic lesions again seen, some simple and some mildly complex with 1 or 2 thin internal septations on the left. These are compatible with benign Bosniak 1 and Bosniak 2 cysts that do not require follow-up. A 2 mm nonobstructing   stone is again seen in the inferior pole of the left kidney. No right urinary stone. No hydroureteronephrosis. Urinary bladder is markedly thick-walled with internal gas bubbles in the lumen.     BOWEL: No wall thickening or obstruction. A bowel anastomosis is seen in the right lower quadrant. Large amount stool seen throughout the colon, including the rectum.     LYMPH NODES: Normal.     VASCULATURE: Normal.     PELVIC ORGANS: Normal. An IUD is in appropriate position.     MUSCULOSKELETAL: Marked dextroscoliosis of the thoracolumbar spine redemonstrated. No suspicious osseous lesions or acute fractures.                                                                         IMPRESSION:      1.  Interval collapse of complex right perinephric abscess, now with trace residual fluid within the collapsed fibrous capsule     2.  Marked wall thickening of the urinary bladder with internal gas bubbles. Recommend correlation with urinalysis for cystitis.     3.  2 mm nonobstructing left renal stone.      EXAM: CT ABDOMEN PELVIS W CONTRAST  LOCATION: [facility]  DATE: 3/1/2024     INDICATION: Poor P.O. intake. Nonverbal. Leukocytosis.  COMPARISON: 02/03/2023  TECHNIQUE: CT scan of the abdomen and pelvis was performed following injection of IV contrast. Multiplanar reformats were obtained. Dose reduction techniques were used.  CONTRAST: iopamidol (ISOVUE 370) solution 58 mL     FINDINGS:   LOWER CHEST: Lung bases clear.     HEPATOBILIARY: Normal.     PANCREAS: Normal.     SPLEEN: Normal.     ADRENAL GLANDS: Normal.     KIDNEYS/BLADDER: Multiple bilateral renal hypodensities presumably cysts although some difficult to characterize due to streak artifact from spinal hardware. Complex fluid collection along the anterior margin of the right kidney 5.2 x 3.5 cm in size S4,   image 181 suggesting perinephric abscess.     BOWEL: Wall thickening of the right and transverse colon. Normal caliber bowel. Moderate amount of colonic stool.     LYMPH NODES: Normal.     VASCULATURE: Unremarkable.     PELVIC ORGANS: Status post mitranoff procedure. Bladder wall thickening not excluded. Air in the bladder presumably related to instrumentation. IUD.     MUSCULOSKELETAL: Thoracolumbar fusion hardware. Scoliosis.                                                                      IMPRESSION:   1.  Complex fluid collection along the anterior margin of the right kidney suggestive of abscess. There is adjacent wall thickening of the colon concerning for colitis.  2.  Bladder is under distended. Wall thickening/cystitis not excluded.      ASSESSMENT/PLAN:  38 year old female with neurogenic bladder secondary to CP who was recently hospitalized for UTI with right perinephric abscess secondary to E coli infection. She has completed antibiotic therapy and CT scan yesterday shows resolving / collapsed abscess. No ongoing signs/symptoms of infection, though difficult to assess clinically as her only presenting symptom was decreased PO intake. Her mother, Aileen, reports that her  appetite is slowly improving, though she did have 5 teeth pulled recently and is more constipated, which may also be contributing to less than normal PO intake.   -Discussed trying an enema at home to help with constipation. If no results, may need more thorough bowel clean out. Continue stool softeners and Miralax.  -Encouraged to increase PO water intake.  -Continue regular CIC per channel, 4-6 times per day.   -Follow up with [facility] as they have been doing previously.    Teresa Ramachandran PA-C  Department of Urology    Virtual Visit Details    Type of service:  Video Visit   Video Start Time:  2:05 PM  Video End Time: 2:23 PM    Originating Location (pt. Location): Home    Distant Location (provider location):  Off-site  Platform used for Video Visit: [employer]    37 minutes spent on the date of the encounter doing chart review, review of test results, interpretation of tests, patient visit, documentation, and discussion with family

## 2024-04-02 NOTE — PROGRESS NOTES
Urology Virtual Visit - Follow Up    Reason for visit: follow up perinephric abscess    HPI: Robi Remy is a 38 year old female with neurogenic bladder secondary to Cerebral Palsy s/p ileocecalcystoplasty in 2014 (Dr. Bustos). Robi is non-verbal and is accompanied by her mother, Aileen, today who provides the entire history. Robi is normally followed by urology at [facility]. She was recently hospitalized at [facility] for UTI with perinephric abscess secondary to E coli infection. The following relevant excerpt from the discharge summary on 3/4/2024 was reviewed:     #UTI and perinephric abscess secondary to E. Coli infection, abscess likely caused by ascending UTI from delayed recognition of UTI given her history of nonverbal severe cerebral palsy   #History of excellent cares of neurogenic bladder s/p mitrofanoff  Patient presented to the ED with concerns of decreased PO intake per her caregivers. Lab findings indicated leukocytosis and UA was positive for leukocyte esterase and nitrites. Has been hospitalized in the past with recurrent UTIs and pyelonephritis. CT with evidence of right sided perinephric abscess. IR consulted and completed aspiration of the perinephric abscess. Started on ceftriaxone, but then transitioned to Bactrim once culture grew pansensitive E. Coli. Unsure etiology of abscess has no recent UTIs and receives excellent care via Mitrofanoff by her parents and caregiver, though is possible UTI developed and then ascended to kidney given family reports of ~1 week to 10 days of decreasing PO intake, which was abnormal for her and may have indicated the start of an infection.   - continue Bactrim BID   - follow up CT in 3-4 weeks to monitor resolution of right perinephric abscess       TODAY   4/2/2024:  Aileen reports that Robi seems to be doing well. Her appetite is slowly improving. She did have 5 teeth pulled several days after her discharge from the hospital, so this  may account for why she is slow to resume normal eating patterns.   They are catheterizing Robi 4-6 times per day. Catheterized volumes never exceed 500 mL. Total daily urine output in the range of 800-1300 mL. Robi drinks mainly water and milk. There has been no gross hematuria or other obvious signs/symptoms of UTI.   Her mother shares that she has been having very small bowel movements and worries that she may be constipated. Prior to her hospitalization, she was having regular, formed bowel movements every day with a regimen of Dulcolax + Miralax. They have continued on this regimen, but bowels are not as regular. Aileen worries that this could also be causing Robi not to consume as much PO.     PEx  GENERAL: alert and no distress  EYES: Eyes grossly normal to inspection.  No discharge or erythema, or obvious scleral/conjunctival abnormalities.  RESP: No audible wheeze, cough, or visible cyanosis.    SKIN: Visible skin clear. No significant rash, abnormal pigmentation or lesions.  NEURO: Cranial nerves grossly intact.     LAB  Creatinine   Date Value Ref Range Status   03/13/2024 0.60 0.51 - 0.95 mg/dL Final   09/07/2014 0.52 0.52 - 1.04 mg/dL Final       Lab Results   Component Value Date    CULTURE 4+ Escherichia coli 03/01/2024    CULTURE No anaerobic organisms isolated 03/01/2024    CULTURE No Growth 03/01/2024    CULTURE No Growth 03/01/2024    CULTURE >100,000 CFU/mL Escherichia coli 02/29/2024       IMAGING:   EXAM: CT ABDOMEN PELVIS W CONTRAST  LOCATION: [facility]  DATE: 4/1/2024     INDICATION: History of renal abscess status post drainage  COMPARISON: 03/01/2024  TECHNIQUE: CT scan of the abdomen and pelvis was performed following injection of IV contrast. Multiplanar reformats were obtained. Dose reduction techniques were used.  CONTRAST: Isovue 370 75 mL     FINDINGS:   LOWER CHEST: Normal.     HEPATOBILIARY: Normal.     PANCREAS: Normal.     SPLEEN: Normal.     ADRENAL  GLANDS: Normal.     KIDNEYS/BLADDER: Previous complex perinephric abscess anterior to the midpole the right kidney appears collapsed and now appears as a lobulated, thick rimmed, fibrous capsule measuring 2.6 x 1.2 cm on series 6 image 36, with trace internal fluid.   Multiple bilateral renal cystic lesions again seen, some simple and some mildly complex with 1 or 2 thin internal septations on the left. These are compatible with benign Bosniak 1 and Bosniak 2 cysts that do not require follow-up. A 2 mm nonobstructing   stone is again seen in the inferior pole of the left kidney. No right urinary stone. No hydroureteronephrosis. Urinary bladder is markedly thick-walled with internal gas bubbles in the lumen.     BOWEL: No wall thickening or obstruction. A bowel anastomosis is seen in the right lower quadrant. Large amount stool seen throughout the colon, including the rectum.     LYMPH NODES: Normal.     VASCULATURE: Normal.     PELVIC ORGANS: Normal. An IUD is in appropriate position.     MUSCULOSKELETAL: Marked dextroscoliosis of the thoracolumbar spine redemonstrated. No suspicious osseous lesions or acute fractures.                                                                         IMPRESSION:      1.  Interval collapse of complex right perinephric abscess, now with trace residual fluid within the collapsed fibrous capsule     2.  Marked wall thickening of the urinary bladder with internal gas bubbles. Recommend correlation with urinalysis for cystitis.     3.  2 mm nonobstructing left renal stone.      EXAM: CT ABDOMEN PELVIS W CONTRAST  LOCATION: [facility]  DATE: 3/1/2024     INDICATION: Poor P.O. intake. Nonverbal. Leukocytosis.  COMPARISON: 02/03/2023  TECHNIQUE: CT scan of the abdomen and pelvis was performed following injection of IV contrast. Multiplanar reformats were obtained. Dose reduction techniques were used.  CONTRAST: iopamidol (ISOVUE 370)  solution 58 mL     FINDINGS:   LOWER CHEST: Lung bases clear.     HEPATOBILIARY: Normal.     PANCREAS: Normal.     SPLEEN: Normal.     ADRENAL GLANDS: Normal.     KIDNEYS/BLADDER: Multiple bilateral renal hypodensities presumably cysts although some difficult to characterize due to streak artifact from spinal hardware. Complex fluid collection along the anterior margin of the right kidney 5.2 x 3.5 cm in size S4,   image 181 suggesting perinephric abscess.     BOWEL: Wall thickening of the right and transverse colon. Normal caliber bowel. Moderate amount of colonic stool.     LYMPH NODES: Normal.     VASCULATURE: Unremarkable.     PELVIC ORGANS: Status post mitranoff procedure. Bladder wall thickening not excluded. Air in the bladder presumably related to instrumentation. IUD.     MUSCULOSKELETAL: Thoracolumbar fusion hardware. Scoliosis.                                                                      IMPRESSION:   1.  Complex fluid collection along the anterior margin of the right kidney suggestive of abscess. There is adjacent wall thickening of the colon concerning for colitis.  2.  Bladder is under distended. Wall thickening/cystitis not excluded.      ASSESSMENT/PLAN:  38 year old female with neurogenic bladder secondary to CP who was recently hospitalized for UTI with right perinephric abscess secondary to E coli infection. She has completed antibiotic therapy and CT scan yesterday shows resolving / collapsed abscess. No ongoing signs/symptoms of infection, though difficult to assess clinically as her only presenting symptom was decreased PO intake. Her mother, Aileen, reports that her appetite is slowly improving, though she did have 5 teeth pulled recently and is more constipated, which may also be contributing to less than normal PO intake.   -Discussed trying an enema at home to help with constipation. If no results, may need more thorough bowel clean out. Continue stool softeners and  Miralax.  -Encouraged to increase PO water intake.  -Continue regular CIC per channel, 4-6 times per day.   -Follow up with [facility] as they have been doing previously.    Teresa Ramachandran PA-C  Department of Urology    Virtual Visit Details    Type of service:  Video Visit   Video Start Time:  2:05 PM  Video End Time: 2:23 PM    Originating Location (pt. Location): Home    Distant Location (provider location):  Off-site  Platform used for Video Visit: [employer]    37 minutes spent on the date of the encounter doing chart review, review of test results, interpretation of tests, patient visit, documentation, and discussion with family

## 2024-04-02 NOTE — NURSING NOTE
Is the patient currently in the state of [place]? YES    Visit mode:VIDEO    If the visit is dropped, the patient can be reconnected by: VIDEO VISIT: Text to cell phone:   Telephone Information:   Mobile [phone]       Will anyone else be joining the visit? NO  (If patient encounters technical issues they should call [phone]    How would you like to obtain your AVS? MyChart    Are changes needed to the allergy or medication list? Yes Mom Aileen reports Pt had a reaction to Betadine    Reason for visit: Consult    Jessica Culp VVF

## 2025-04-12 ENCOUNTER — APPOINTMENT (OUTPATIENT)
Dept: GENERAL RADIOLOGY | Facility: CLINIC | Age: 40
End: 2025-04-12
Attending: EMERGENCY MEDICINE
Payer: MEDICARE

## 2025-04-12 ENCOUNTER — APPOINTMENT (OUTPATIENT)
Dept: CT IMAGING | Facility: CLINIC | Age: 40
End: 2025-04-12
Attending: EMERGENCY MEDICINE
Payer: MEDICARE

## 2025-04-12 ENCOUNTER — HOSPITAL ENCOUNTER (EMERGENCY)
Facility: CLINIC | Age: 40
Discharge: HOME | End: 2025-04-12
Attending: EMERGENCY MEDICINE | Admitting: EMERGENCY MEDICINE
Payer: MEDICARE

## 2025-04-12 VITALS
HEART RATE: 95 BPM | OXYGEN SATURATION: 100 % | RESPIRATION RATE: 18 BRPM | DIASTOLIC BLOOD PRESSURE: 69 MMHG | SYSTOLIC BLOOD PRESSURE: 93 MMHG | TEMPERATURE: 98.3 F

## 2025-04-12 DIAGNOSIS — K59.00 CONSTIPATION, UNSPECIFIED CONSTIPATION TYPE: ICD-10-CM

## 2025-04-12 DIAGNOSIS — G80.9 CEREBRAL PALSY, UNSPECIFIED TYPE (H): ICD-10-CM

## 2025-04-12 LAB
ALBUMIN SERPL BCG-MCNC: 4.2 G/DL (ref 3.5–5.2)
ALBUMIN UR-MCNC: 10 MG/DL
ALP SERPL-CCNC: 95 U/L (ref 40–150)
ALT SERPL W P-5'-P-CCNC: 14 U/L (ref 0–50)
ANION GAP SERPL CALCULATED.3IONS-SCNC: 10 MMOL/L (ref 7–15)
APPEARANCE UR: ABNORMAL
AST SERPL W P-5'-P-CCNC: 21 U/L (ref 0–45)
BACTERIA #/AREA URNS HPF: ABNORMAL /HPF
BASOPHILS # BLD AUTO: 0 10E3/UL (ref 0–0.2)
BASOPHILS NFR BLD AUTO: 0 %
BILIRUB SERPL-MCNC: 0.2 MG/DL
BILIRUB UR QL STRIP: NEGATIVE
BUN SERPL-MCNC: 20.9 MG/DL (ref 6–20)
CALCIUM SERPL-MCNC: 10.2 MG/DL (ref 8.8–10.4)
CHLORIDE SERPL-SCNC: 106 MMOL/L (ref 98–107)
COLOR UR AUTO: YELLOW
CREAT SERPL-MCNC: 0.45 MG/DL (ref 0.51–0.95)
EGFRCR SERPLBLD CKD-EPI 2021: >90 ML/MIN/1.73M2
EOSINOPHIL # BLD AUTO: 0.1 10E3/UL (ref 0–0.7)
EOSINOPHIL NFR BLD AUTO: 1 %
ERYTHROCYTE [DISTWIDTH] IN BLOOD BY AUTOMATED COUNT: 13.1 % (ref 10–15)
GLUCOSE SERPL-MCNC: 111 MG/DL (ref 70–99)
GLUCOSE UR STRIP-MCNC: NEGATIVE MG/DL
HCO3 SERPL-SCNC: 25 MMOL/L (ref 22–29)
HCT VFR BLD AUTO: 40.3 % (ref 35–47)
HGB BLD-MCNC: 13.1 G/DL (ref 11.7–15.7)
HGB UR QL STRIP: NEGATIVE
IMM GRANULOCYTES # BLD: 0 10E3/UL
IMM GRANULOCYTES NFR BLD: 0 %
KETONES UR STRIP-MCNC: 20 MG/DL
LEUKOCYTE ESTERASE UR QL STRIP: ABNORMAL
LIPASE SERPL-CCNC: 21 U/L (ref 13–60)
LYMPHOCYTES # BLD AUTO: 2.6 10E3/UL (ref 0.8–5.3)
LYMPHOCYTES NFR BLD AUTO: 26 %
MCH RBC QN AUTO: 27.3 PG (ref 26.5–33)
MCHC RBC AUTO-ENTMCNC: 32.5 G/DL (ref 31.5–36.5)
MCV RBC AUTO: 84 FL (ref 78–100)
MONOCYTES # BLD AUTO: 0.5 10E3/UL (ref 0–1.3)
MONOCYTES NFR BLD AUTO: 5 %
MUCOUS THREADS #/AREA URNS LPF: PRESENT /LPF
NEUTROPHILS # BLD AUTO: 6.7 10E3/UL (ref 1.6–8.3)
NEUTROPHILS NFR BLD AUTO: 67 %
NITRATE UR QL: POSITIVE
NRBC # BLD AUTO: 0 10E3/UL
NRBC BLD AUTO-RTO: 0 /100
PH UR STRIP: 7.5 [PH] (ref 5–7)
PLATELET # BLD AUTO: 248 10E3/UL (ref 150–450)
POTASSIUM SERPL-SCNC: 4.3 MMOL/L (ref 3.4–5.3)
PROT SERPL-MCNC: 7.7 G/DL (ref 6.4–8.3)
RBC # BLD AUTO: 4.8 10E6/UL (ref 3.8–5.2)
RBC URINE: 5 /HPF
SODIUM SERPL-SCNC: 141 MMOL/L (ref 135–145)
SP GR UR STRIP: 1.02 (ref 1–1.03)
SQUAMOUS EPITHELIAL: 1 /HPF
TRANSITIONAL EPI: 2 /HPF
UROBILINOGEN UR STRIP-MCNC: NORMAL MG/DL
WBC # BLD AUTO: 9.9 10E3/UL (ref 4–11)
WBC URINE: 27 /HPF

## 2025-04-12 PROCEDURE — 36415 COLL VENOUS BLD VENIPUNCTURE: CPT | Performed by: EMERGENCY MEDICINE

## 2025-04-12 PROCEDURE — 87086 URINE CULTURE/COLONY COUNT: CPT | Performed by: EMERGENCY MEDICINE

## 2025-04-12 PROCEDURE — 99285 EMERGENCY DEPT VISIT HI MDM: CPT | Mod: 25 | Performed by: EMERGENCY MEDICINE

## 2025-04-12 PROCEDURE — 250N000013 HC RX MED GY IP 250 OP 250 PS 637: Performed by: EMERGENCY MEDICINE

## 2025-04-12 PROCEDURE — 87186 SC STD MICRODIL/AGAR DIL: CPT | Performed by: EMERGENCY MEDICINE

## 2025-04-12 PROCEDURE — 250N000011 HC RX IP 250 OP 636: Performed by: EMERGENCY MEDICINE

## 2025-04-12 PROCEDURE — 85004 AUTOMATED DIFF WBC COUNT: CPT | Performed by: EMERGENCY MEDICINE

## 2025-04-12 PROCEDURE — 82374 ASSAY BLOOD CARBON DIOXIDE: CPT | Performed by: EMERGENCY MEDICINE

## 2025-04-12 PROCEDURE — 83690 ASSAY OF LIPASE: CPT | Performed by: EMERGENCY MEDICINE

## 2025-04-12 PROCEDURE — 74177 CT ABD & PELVIS W/CONTRAST: CPT | Mod: 26 | Performed by: RADIOLOGY

## 2025-04-12 PROCEDURE — 71045 X-RAY EXAM CHEST 1 VIEW: CPT | Mod: 26 | Performed by: RADIOLOGY

## 2025-04-12 PROCEDURE — 99284 EMERGENCY DEPT VISIT MOD MDM: CPT | Performed by: EMERGENCY MEDICINE

## 2025-04-12 PROCEDURE — 80048 BASIC METABOLIC PNL TOTAL CA: CPT | Performed by: EMERGENCY MEDICINE

## 2025-04-12 PROCEDURE — 74177 CT ABD & PELVIS W/CONTRAST: CPT

## 2025-04-12 PROCEDURE — 96365 THER/PROPH/DIAG IV INF INIT: CPT | Mod: 59 | Performed by: EMERGENCY MEDICINE

## 2025-04-12 PROCEDURE — 81001 URINALYSIS AUTO W/SCOPE: CPT | Performed by: EMERGENCY MEDICINE

## 2025-04-12 PROCEDURE — 71045 X-RAY EXAM CHEST 1 VIEW: CPT

## 2025-04-12 RX ORDER — IOPAMIDOL 755 MG/ML
53 INJECTION, SOLUTION INTRAVASCULAR ONCE
Status: COMPLETED | OUTPATIENT
Start: 2025-04-12 | End: 2025-04-12

## 2025-04-12 RX ORDER — CEFTRIAXONE 1 G/1
1 INJECTION, POWDER, FOR SOLUTION INTRAMUSCULAR; INTRAVENOUS ONCE
Status: COMPLETED | OUTPATIENT
Start: 2025-04-12 | End: 2025-04-12

## 2025-04-12 RX ORDER — FUROSEMIDE 10 MG/ML
160 INJECTION INTRAMUSCULAR; INTRAVENOUS ONCE
Status: DISCONTINUED | OUTPATIENT
Start: 2025-04-12 | End: 2025-04-12

## 2025-04-12 RX ADMIN — DOCUSATE SODIUM 226 ML: 50 LIQUID ORAL at 11:46

## 2025-04-12 RX ADMIN — IOPAMIDOL 53 ML: 755 INJECTION, SOLUTION INTRAVENOUS at 10:19

## 2025-04-12 RX ADMIN — CEFTRIAXONE 1 G: 1 INJECTION, POWDER, FOR SOLUTION INTRAMUSCULAR; INTRAVENOUS at 11:25

## 2025-04-12 ASSESSMENT — ACTIVITIES OF DAILY LIVING (ADL)
ADLS_ACUITY_SCORE: 62
ADLS_ACUITY_SCORE: 62
ADLS_ACUITY_SCORE: 64
ADLS_ACUITY_SCORE: 62

## 2025-04-12 ASSESSMENT — COLUMBIA-SUICIDE SEVERITY RATING SCALE - C-SSRS: IS THE PATIENT NOT ABLE TO COMPLETE C-SSRS: UNABLE TO VERBALIZE

## 2025-04-12 NOTE — DISCHARGE INSTRUCTIONS
Continue MiraLAX and stool softener.  Urine culture results are pending.  The patient did have 1 dose of ceftriaxone IV.  Please follow-up with your regular doctor for reevaluation within the next week or so.  Return to the ED for fever, or other concerns

## 2025-04-12 NOTE — ED TRIAGE NOTES
Pt agitated and behaving differently than her baseline per her caregiver since about 9PM. Pt moaning and restless.   Unable to take BP in triage.

## 2025-04-12 NOTE — ED PROVIDER NOTES
"    [place] EMERGENCY DEPARTMENT ([facility])    4/12/25       ED PROVIDER NOTE   ED 15   History     Chief Complaint   Patient presents with    Altered Mental Status     The history is provided by the patient and medical records.     Robi Remy is a 39 year old nonverbal female with history of cerebral palsy with quadriplegia, neurogenic bladder s/p Mitrofanoff, prior UTI complicated by right-sided perinephric abscess, left renal stone, chronic constipation who presents to the Emergency Department with altered mental status. Accompanied by longtime respite caregivers, Neisha and Ben.  Patient was at her usual state of health yesterday, went to adult  without issue.  Did eat some dinner at 7pm and around 9 PM she started moaning and groaning, and this went on all night last night. She wasn't full out crying but was moaning and acting unusually, did not sleep the entire night.  Her caregivers are concerned as she is normally very regular and sound sleeper and this is very unusual for her.  Her caregiver Ben states that patient did have \"some belly gurgles,\"  thought she would vomit but did not have any vomiting.  They also noted possible fever, no documented fevers.  They brought her here for further evaluation.  She did not have any breakfast yet today as they came here for evaluation first. Here she isn't moaning at all, which is unusual as she had been moaning nonstop all night prior. Per caregivers she is unable to answer yes/no questions, unable to indicate or localize any pain, unable to cooperate with exam due to cognitive delay.  Caregivers do intermittently cath patient's Mitrofanoff through her belly button, several times a day.  Neisha did this at 2330 last night and 730 today.  Patient has history of constipation on miralax, does sit on toilet for bowel movement. The other day Neisha did have to \"manually clean her out,\"has not had to do that for some time.  Caregivers note that " she has had admissions here in the past, was last hospitalized in spring 2024.  No history of appendectomy.  Caregivers note that she has been hydrating well without issue, typically drinks milk.    Past Medical History  Past Medical History:   Diagnosis Date    Cerebral palsy (H)     Developmental delay     Long Q-T syndrome     takes nadolol    Neurogenic bladder     Nonverbal     Self-catheterizes urinary bladder     Parents Cath patient    Spastic quadriplegia (H)      Past Surgical History:   Procedure Laterality Date    BACK SURGERY  2002    spine fusion    EXAM UNDER ANESTHESIA BREAST N/A 3/13/2024    Procedure: Exam Under Anesthesia Breast;  Surgeon: Matilda Alcantar MD;  Location: [facility] OR    EXAM UNDER ANESTHESIA DENTAL  2010    EXAM UNDER ANESTHESIA PELVIC N/A 1/4/2018    Procedure: EXAM UNDER ANESTHESIA PELVIC;  Pap Smear, Breast exam, Mirena IUD removal and replacement.  Dental Exam, Radiographs, Dental Restorations, Periodontal Therapy, Dental Extractions, Biopsies   ;  Surgeon: Briseida Iyer MD;  Location: [facility] OR    EXAM UNDER ANESTHESIA PELVIC N/A 3/13/2024    Procedure: PAP Smear Mirena IUD removal and replacement;  Surgeon: Matilda Alcantar MD;  [facility] OR    EXAM UNDER ANESTHESIA, RESTORATIONS, EXTRACTION(S) DENTAL COMPLEX, COMBINED Bilateral 3/13/2024    Procedure: Bilateral dental exam, Dental radiograph, periodontal therapy, fluoride, varnish in the mouth, dental extractions x 5;  Surgeon: Jannie Ferguson DDS;  [facility]: [facility] OR    EXAM UNDER ANESTHESIA, RESTORATIONS, EXTRACTION(S) DENTAL, COMBINED N/A 1/4/2018    Procedure: COMBINED EXAM UNDER ANESTHESIA, RESTORATIONS, EXTRACTION(S) DENTAL;  Dental exam, x- rays, periodontal therapy and flouride varnish;  Surgeon: Ara Urbano DDS;  Location: [facility] OR    IR RETROPERITONEAL ABSCESS DRAINAGE  3/1/2024    LAPAROSCOPIC MITROFANOFF PROCEDURE (APPENDIX CONDUIT) N/A 9/4/2014    Procedure: LAPAROSCOPIC MITROFANOFF  PROCEDURE (APPENDIX CONDUIT);  Surgeon: Naren Bustos MD;  [facility]    LAPAROSCOPIC REPAIR BLADDER N/A 9/4/2014    Procedure: LAPAROSCOPIC REPAIR BLADDER;  Surgeon: Naren Bustos MD;  [facility]    REPLACE INTRAUTERINE DEVICE N/A 1/4/2018    Procedure: REPLACE INTRAUTERINE DEVICE;  Mirena IUD removal and replacement;  Surgeon: Briseida Iyer MD;  [facility] OR     docusate sodium (COLACE) 100 MG capsule  glycopyrrolate (GLYCATE) 2 MG tablet  ketoconazole (NIZORAL) 2 % external shampoo  Multiple Vitamins-Iron (MULTIVITAMIN/IRON PO)  nadolol (CORGARD) 20 MG tablet  polyethylene glycol (MIRALAX) 17 GM/Dose powder      Allergies   Allergen Reactions    Tape [Adhesive Tape]      Skin blistering  Tape had been on for one month without changing. Unsure what tape     Family History  Family History   Problem Relation Age of Onset    Heart Disease Father         long QT syndrome     Anesthesia Reaction No family hx of     Thrombosis No family hx of      Social History   Social History     Tobacco Use    Smoking status: Never     Passive exposure: Never    Smokeless tobacco: Never   Substance Use Topics    Alcohol use: No    Drug use: No      A complete review of systems was attempted but limited due to developmental delay.    Physical Exam   BP: (!) 132/117  Pulse: 95  Temp: 98.3  F (36.8  C)  Resp: 20  SpO2: 93 %    Physical Exam  Physical Exam   Constitutional: well nourished, appears alert, lying flat with no respiratory difficulty.  Patient nonverbal, paraplegic with flexion contractures.  HENT:   Head: Normocephalic and atraumatic.   Eyes: Conjunctivae are normal.  Disconjugate gaze, pupils are equal, round,reactive, no scleral icterus    mucous membranes are moist  Neck:   no adenopathy, no bony tenderness  Cardiovascular: regular rate and rhythm without murmurs or gallops  Pulmonary/Chest: Clear to auscultation bilaterally, with no wheezes or retractions. No respiratory  distress.  Normal work of breathing  GI: Soft with good bowel sounds. Mitrofanoff valve  Non-tender, non-distended, with no guarding, no rebound, no peritoneal signs.  Abdomen palpated throughout with no reaction from patient  Back:  No bony or CVA tenderness   Musculoskeletal: Moving hands about, paraplegic. Flexion contractures  Skin: Skin is warm and dry. No rash noted.   Neurological: alert, nonverbal, does not follow commands at baseline    ED Course, Procedures, & Data      Procedures            Results for orders placed or performed during the hospital encounter of 04/12/25   XR Chest Port 1 View     Status: None    Narrative    Exam: XR CHEST PORT 1 VIEW, 4/12/2025 8:37 AM    Comparison: None available    History: AMS    Findings:  Portable AP view of the chest. The patient is rotated to the right.  Extensive hardware throughout the thoracolumbar are scoliosis. No  focal airspace opacities. No pleural effusion or pneumothorax. Heart  is within normal limits. Visualized upper abdomen is unremarkable.      Impression    Impression:   Negative chest x-ray.    I have personally reviewed the examination and initial interpretation  and I agree with the findings.    JUAN MIGUEL POLK MD         SYSTEM ID:  [id number]   CT Abdomen Pelvis w Contrast     Status: None    Narrative    Examination: CT ABDOMEN PELVIS W CONTRAST, 4/12/2025 10:41 AM    Technique:  Helical CT images from the lung bases through the  symphysis pubis were obtained with contrast.  Coronal reformatted  images were generated at a workstation for further assessment.    Contrast:  53 mL Isovue-370    Comparison: 80 4/1/2024, 3/1/2024.    History: nonverbal, paraplegic, didn't sleep all night , moaning.   caregivers think she has abdl pain    Findings:    Abdomen and pelvis:   Liver: No suspicious liver lesions.   Gallbladder: No gallstones. No evidence of acute cholecystitis.  Spleen: Normal size.  Pancreas: No suspicious pancreatic lesions. The  pancreatic duct is not  dilated.  Adrenal glands: No adrenal nodules.  Urinary system: No suspicious renal mass. Multiple renal cysts  bilaterally, more extensive in the left kidney. No hydronephrosis,  hydroureter, or obstructing renal stones. Urinary bladder is  unremarkable.  Reproductive organs: IUD in place.  Bowel: No abnormally dilated loops of large or small bowel. Bowel  anastomosis in the right lower quadrant appears patent. No abnormal  bowel wall thickening or enhancement. Large colonic and rectal stool  burden.   Lymph nodes: No retroperitoneal, mesenteric, or pelvic  lymphadenopathy.  Fluid: No free fluid within the abdomen.  Vessels: No infrarenal aortic aneurysm. The portal, superior  mesenteric, and splenic veins are patent.    Lung bases: No consolidation or pleural effusion.     Bones and soft tissues: No suspicious osseous lesions. Scoliosis with  posterior fusion hardware. Chronic displacement of the left hip. Areas  of skin thickening and soft tissue stranding in the right gluteal  region and proximal lateral thighs. No ulceration or fluid collection  identified.       Impression    Impression:   1. No acute pathology in the abdomen and pelvis.  2. Constipation with a large rectal stool burden.  3. Areas of thickening and soft tissue stranding in the right gluteal  region and proximal lateral thighs. No ulceration or fluid collections  identified.    I have personally reviewed the examination and initial interpretation  and I agree with the findings.    JUAN MIGUEL POLK MD         SYSTEM ID:  [id number]   [place] Draw     Status: None (In process)    Narrative    The following orders were created for panel order [place] Draw.  Procedure                               Abnormality         Status                     ---------                               -----------         ------                     Extra Blue Top Tube[[id number]]                              In process                 Extra Red  Top Tube[[id number]]                               In process                 Extra Green Top (Lithium...[[id number]]                      In process                 Extra Purple Top Tube[[id number]]                           In process                   Please view results for these tests on the individual orders.   Comprehensive metabolic panel     Status: Abnormal   Result Value Ref Range    Sodium 141 135 - 145 mmol/L    Potassium 4.3 3.4 - 5.3 mmol/L    Carbon Dioxide (CO2) 25 22 - 29 mmol/L    Anion Gap 10 7 - 15 mmol/L    Urea Nitrogen 20.9 (H) 6.0 - 20.0 mg/dL    Creatinine 0.45 (L) 0.51 - 0.95 mg/dL    GFR Estimate >90 >60 mL/min/1.73m2    Calcium 10.2 8.8 - 10.4 mg/dL    Chloride 106 98 - 107 mmol/L    Glucose 111 (H) 70 - 99 mg/dL    Alkaline Phosphatase 95 40 - 150 U/L    AST 21 0 - 45 U/L    ALT 14 0 - 50 U/L    Protein Total 7.7 6.4 - 8.3 g/dL    Albumin 4.2 3.5 - 5.2 g/dL    Bilirubin Total 0.2 <=1.2 mg/dL   Lipase     Status: Normal   Result Value Ref Range    Lipase 21 13 - 60 U/L   UA with Microscopic reflex to Culture     Status: Abnormal    Specimen: Urine, Clean Catch   Result Value Ref Range    Color Urine Yellow Colorless, Straw, Light Yellow, Yellow    Appearance Urine Slightly Cloudy (A) Clear    Glucose Urine Negative Negative mg/dL    Bilirubin Urine Negative Negative    Ketones Urine 20 (A) Negative mg/dL    Specific Gravity Urine 1.018 1.003 - 1.035    Blood Urine Negative Negative    pH Urine 7.5 (H) 5.0 - 7.0    Protein Albumin Urine 10 (A) Negative mg/dL    Urobilinogen Urine Normal Normal mg/dL    Nitrite Urine Positive (A) Negative    Leukocyte Esterase Urine Trace (A) Negative    Bacteria Urine Moderate (A) None Seen /HPF    Mucus Urine Present (A) None Seen /LPF    RBC Urine 5 (H) <=2 /HPF    WBC Urine 27 (H) <=5 /HPF    Squamous Epithelials Urine 1 <=1 /HPF    Transitional Epithelials Urine 2 (H) <=1 /HPF    Narrative    Urine Culture ordered based on laboratory criteria   CBC with  platelets and differential     Status: None   Result Value Ref Range    WBC Count 9.9 4.0 - 11.0 10e3/uL    RBC Count 4.80 3.80 - 5.20 10e6/uL    Hemoglobin 13.1 11.7 - 15.7 g/dL    Hematocrit 40.3 35.0 - 47.0 %    MCV 84 78 - 100 fL    MCH 27.3 26.5 - 33.0 pg    MCHC 32.5 31.5 - 36.5 g/dL    RDW 13.1 10.0 - 15.0 %    Platelet Count 248 150 - 450 10e3/uL    % Neutrophils 67 %    % Lymphocytes 26 %    % Monocytes 5 %    % Eosinophils 1 %    % Basophils 0 %    % Immature Granulocytes 0 %    NRBCs per 100 WBC 0 <1 /100    Absolute Neutrophils 6.7 1.6 - 8.3 10e3/uL    Absolute Lymphocytes 2.6 0.8 - 5.3 10e3/uL    Absolute Monocytes 0.5 0.0 - 1.3 10e3/uL    Absolute Eosinophils 0.1 0.0 - 0.7 10e3/uL    Absolute Basophils 0.0 0.0 - 0.2 10e3/uL    Absolute Immature Granulocytes 0.0 <=0.4 10e3/uL    Absolute NRBCs 0.0 10e3/uL   CBC with platelets differential     Status: None    Narrative    The following orders were created for panel order CBC with platelets differential.  Procedure                               Abnormality         Status                     ---------                               -----------         ------                     CBC with platelets and ...[[id number]]                      Final result                 Please view results for these tests on the individual orders.     Medications   iopamidol (ISOVUE-370) solution 53 mL (53 mLs Intravenous $Given 4/12/25 1019)   sodium chloride (PF) 0.9% PF flush 63 mL (63 mLs Intravenous $Given 4/12/25 1019)   Enema Compound (docusate/mineral oil/NaPhos) NO MAG CIT PREMIX (226 mLs Rectal $Given 4/12/25 1146)   cefTRIAXone (ROCEPHIN) 1 g vial to attach to  mL bag for ADULTS or NS 50 mL bag for PEDS (0 g Intravenous Stopped 4/12/25 1205)     Labs Ordered and Resulted from Time of ED Arrival to Time of ED Departure   COMPREHENSIVE METABOLIC PANEL - Abnormal       Result Value    Sodium 141      Potassium 4.3      Carbon Dioxide (CO2) 25      Anion Gap 10       Urea Nitrogen 20.9 (*)     Creatinine 0.45 (*)     GFR Estimate >90      Calcium 10.2      Chloride 106      Glucose 111 (*)     Alkaline Phosphatase 95      AST 21      ALT 14      Protein Total 7.7      Albumin 4.2      Bilirubin Total 0.2     ROUTINE UA WITH MICROSCOPIC REFLEX TO CULTURE - Abnormal    Color Urine Yellow      Appearance Urine Slightly Cloudy (*)     Glucose Urine Negative      Bilirubin Urine Negative      Ketones Urine 20 (*)     Specific Gravity Urine 1.018      Blood Urine Negative      pH Urine 7.5 (*)     Protein Albumin Urine 10 (*)     Urobilinogen Urine Normal      Nitrite Urine Positive (*)     Leukocyte Esterase Urine Trace (*)     Bacteria Urine Moderate (*)     Mucus Urine Present (*)     RBC Urine 5 (*)     WBC Urine 27 (*)     Squamous Epithelials Urine 1      Transitional Epithelials Urine 2 (*)    LIPASE - Normal    Lipase 21     CBC WITH PLATELETS AND DIFFERENTIAL    WBC Count 9.9      RBC Count 4.80      Hemoglobin 13.1      Hematocrit 40.3      MCV 84      MCH 27.3      MCHC 32.5      RDW 13.1      Platelet Count 248      % Neutrophils 67      % Lymphocytes 26      % Monocytes 5      % Eosinophils 1      % Basophils 0      % Immature Granulocytes 0      NRBCs per 100 WBC 0      Absolute Neutrophils 6.7      Absolute Lymphocytes 2.6      Absolute Monocytes 0.5      Absolute Eosinophils 0.1      Absolute Basophils 0.0      Absolute Immature Granulocytes 0.0      Absolute NRBCs 0.0     URINE CULTURE     CT Abdomen Pelvis w Contrast   Final Result   Impression:    1. No acute pathology in the abdomen and pelvis.   2. Constipation with a large rectal stool burden.   3. Areas of thickening and soft tissue stranding in the right gluteal   region and proximal lateral thighs. No ulceration or fluid collections   identified.      I have personally reviewed the examination and initial interpretation   and I agree with the findings.      JUAN MIGUEL POLK MD            SYSTEM ID:  [id number]       XR Chest Port 1 View   Final Result   Impression:    Negative chest x-ray.      I have personally reviewed the examination and initial interpretation   and I agree with the findings.      JUAN MIGUEL POLK MD            SYSTEM ID:  [id number]             Critical care was not performed.     Medical Decision Making  The patient's presentation was of high complexity (a chronic illness severe exacerbation, progression, or side effect of treatment).    The patient's evaluation involved:  an assessment requiring an independent historian (.  Caregivers)  review of external note(s) from 3+ sources (prior admission, ED and office visit)  ordering and/or review of 3+ test(s) in this encounter (see separate area of note for details)  independent interpretation of testing performed by another health professional (I independently reviewed the CT of the abdomen pelvis)    The patient's management necessitated moderate risk (prescription drug management including medications given in the ED).    Assessment & Plan      I have reviewed the nursing notes.   Emergency Department course:  The patient was seen and examined at 0805 am in room 15..   Robi Remy is a 39 year old female with a history of cerebral palsy, paraplegia and developmental delay who is nonverbal at baseline who presents with caregivers for not sleeping all night in the morning.  Examination is unremarkable.  Patient does not appear to have abdominal pain.  She is not moaning here in the ED.    Chart review shows that the patient had a CT of the abdomen and pelvis on 4/1/20225 which showed 1.  Interval collapse of complex right perinephric abscess, now with trace residual fluid within the collapsed fibrous capsule   2.  Marked wall thickening of the urinary bladder with internal gas bubbles. Recommend correlation with urinalysis for cystitis.   3.  2 mm nonobstructing left renal stone.    Laboratory studies show an unremarkable CBC, with a WBC of 9.9.   Comprehensive metabolic panel shows a slightly elevated BUN of 20.9 and glucose of 111 is otherwise unremarkable.  Lipase is normal at 21  UA is nitrite positive with trace LCE and moderate bacteria as well as 27 WBCs.  This will be sent for culture.  However, the patient has a Mitrofanoff valve.  I treated her with 1 dose of ceftriaxone IV.  Urine culture is pending.  If her urine culture is positive, she will need to follow-up with her regular physicians for p.o. antibiotics.    Caregivers would prefer to proceed with CT scanning to evaluate the patient's presumed abdominal pain that she experienced last night.  CT of the abdomen /pelvis shows Impression:   1. No acute pathology in the abdomen and pelvis.  2. Constipation with a large rectal stool burden.  3. Areas of thickening and soft tissue stranding in the right gluteal  region and proximal lateral thighs. No ulceration or fluid collections  identified.  Patient is nontoxic-appearing.  She was able to eat and she did so prior to CT resulting.  CT shows significant stool burden and I treated her with a pink lady enema.  She tolerated the enema well and had good results.    Robi Remy is a 39 year old female with a history of cerebral palsy and developmental delay who is nonverbal at baseline who presents with caregivers who state she did not sleep at night and was moaning.  She is alert and nontoxic-appearing during her ED course.  CT shows significant constipation, no acute pathology..  The patient does take MiraLAX as well as stool softener daily.  I treated her with 1 dose of ceftriaxone for possible urinary tract infection.  She is alert and nontoxic-appearing think she is safe to be discharged home.  She has received a pink lady enema, with good results.  I recommend continuing the MiraLAX and stool softener daily.  I would like her to follow-up with her regular physician for reevaluation within 1 to 2 weeks.  I discussed with the caregivers  reasons to return to the emergency department.    I have reviewed the findings, diagnosis, plan and need for follow up with the patient.    New Prescriptions    No medications on file       Final diagnoses:   Constipation, unspecified constipation type   Cerebral palsy, unspecified type (H)     I, Cami Hoff, am serving as a trained medical scribe to document services personally performed by Comfort Mendoza MD based on the provider's statements to me on April 12, 2025.  This document has been checked and approved by the attending provider.    I, Comfort Mendoza MD, was physically present and have reviewed and verified the accuracy of this note documented by Cami Hoff, medical scribe.    This note was created in part by the use of Dragon voice recognition dictation system. Inadvertent grammatical errors and typographical errors may still exist.  MD Comfort Uriarte MD     [facility] EMERGENCY DEPARTMENT  4/12/2025     Comfort Mendoza MD  04/12/25 0999

## 2025-04-13 LAB
BACTERIA UR CULT: ABNORMAL
BACTERIA UR CULT: ABNORMAL

## 2025-04-13 RX ORDER — CEFPODOXIME PROXETIL 200 MG/1
200 TABLET, FILM COATED ORAL 2 TIMES DAILY
Qty: 12 TABLET | Refills: 0 | Status: SHIPPED | OUTPATIENT
Start: 2025-04-13 | End: 2025-04-19

## 2025-04-13 NOTE — ED NOTES
Telephone note:  4/13/2025 0920 am  Urine culture is positive for >100,000 CFU of E coli.  The patient had received 1 dose of ceftriaxone IV yesterday in the emergency department.  I spoke to the patient's legal guardian Aileen Remy (mother) regarding this finding.  I e-prescribed cefpodoxime for 6 days to the [facility] drugstore in [place].  Patient's mother states that someone will  this prescription and start the patient on it today.  MD Reji Granda Alda L, MD  04/13/25 [id number]

## 2025-04-14 ENCOUNTER — TELEPHONE (OUTPATIENT)
Dept: NURSING | Facility: CLINIC | Age: 40
End: 2025-04-14
Payer: MEDICARE

## 2025-04-14 NOTE — TELEPHONE ENCOUNTER
[facility] ([place])    Reason for call: Lab Result Notification     Lab Result (including Rx patient on, if applicable).  If culture, copy of lab report at bottom.  Lab Result: Urine Culture - See Below    ED Rx: cefpodoxime (VANTIN) 200 MG tablet - Take 1 tablet (200 mg) by mouth 2 times daily for 6 days   >100,000 CFU/mL Escherichia coli Abnormal  (SUSCEPTIBLE)    Creatinine Level (mg/dl)   Creatinine   Date Value Ref Range Status   04/12/2025 0.45 (L) 0.51 - 0.95 mg/dL Final   09/07/2014 0.52 0.52 - 1.04 mg/dL Final    Creatinine clearance (ml/min), if applicable    Creatinine clearance cannot be calculated (Unknown ideal weight.)     ED Symptoms: Presented to the ED with restlessness, positive UA.     Current Symptoms: Unable to assess.     RN Recommendations/Instructions per [place] ED lab result protocol:   [facility] ED lab result protocol utilized: Urine Culture  Continue antibiotic/medication as prescribed: Cefpodoxime    Unable to reach patient/caregiver.     Left voicemail message requesting a call back to [phone] between 9 a.m. and 5:30 p.m. for patient's ED/ lab results.    Letter pended to be sent via [employer]PS mail.        АЛЕКСАНДР LOWE RN

## 2025-04-14 NOTE — LETTER
April 14, 2025        Robi Remy  [address] [place] [address]          Dear Robi Remy:    You were seen in the [facility] Emergency Department at [facility] ([place] on 4/14/2025.  We are unable to reach you by phone, so we are sending you this letter.     It is important that you call [facility] Emergency Department lab result nurse at [phone], as we have information to relay to you AND/OR we MAY have to make some changes in your treatment.    Best time to call back is between 9AM and 5:30PM, 7 days a week.      Sincerely,     [facility] Emergency Department Lab Result RN  [phone]

## 2025-04-15 NOTE — TELEPHONE ENCOUNTER
Patient's guardian calling back. States that Robi had a large bowel movement this morning and seems to be resting peacefully now.  States she is non-verbal so it is hard to gauge her symptoms. Care advice given per the urine culture protocol. Patient's guardian advised understanding.     АЛЕКСАНДР LOWE RN

## (undated) DEVICE — SPONGE RAY-TEC 4X8" 7318

## (undated) DEVICE — SYR EAR BULB 3OZ 0035830

## (undated) DEVICE — TOOTHBRUSH ADULT NON STERILE MDS136850

## (undated) DEVICE — SOL WATER IRRIG 1000ML BOTTLE 2F7114

## (undated) DEVICE — Device

## (undated) DEVICE — ANTIFOG SOLUTION W/FOAM PAD 31142527

## (undated) DEVICE — DRSG GAUZE 4X8" NON21842

## (undated) DEVICE — SOL NACL 0.9% IRRIG 1000ML BOTTLE 2F7124

## (undated) DEVICE — LINEN ORTHO PACK 5446

## (undated) DEVICE — PREP POVIDONE IODINE SOLUTION 10% 120ML

## (undated) DEVICE — PACK SET-UP STD 9102

## (undated) DEVICE — SPECIMEN CONTAINER 5OZ STERILE 2600SA

## (undated) DEVICE — RX BACITRACIN OINTMENT 0.9G 1/32OZ 01680 11109

## (undated) DEVICE — OINTMENT ANTIBIOTIC BACITRACIN ZINC .9 G 1171

## (undated) DEVICE — COVER PROBE ULTRASOUND 3D W/GEL 5X96" LF 20-P3D596

## (undated) DEVICE — GOWN XLG DISP 9545

## (undated) DEVICE — LINEN TOWEL PACK X5 5464

## (undated) DEVICE — JELLY LUBRICATING SURGILUBE 2OZ TUBE 0281-0205-02

## (undated) DEVICE — SUCTION CANISTER MEDIVAC LINER 1500ML W/LID 65651-515

## (undated) DEVICE — BRUSH SURGICAL SCRUB PLAIN STERILE 4454A

## (undated) DEVICE — BASIN SET MAJOR

## (undated) DEVICE — LINEN GOWN X4 5410

## (undated) DEVICE — SWAB PROCTO 16" 2/PK 32-046

## (undated) DEVICE — SPONGE SURGIFOAM 12 1972

## (undated) DEVICE — SOL HYDROGEN PEROXIDE 3% 4OZ BOTTLE F0010

## (undated) DEVICE — PREP POVIDONE IODINE SWABSTICKS TRIPLE PACK MDS093902A

## (undated) DEVICE — GLOVE BIOGEL PI MICRO INDICATOR UNDERGLOVE SZ 7.5 48975

## (undated) DEVICE — LIGHT HANDLE X2

## (undated) DEVICE — GOWN IMPERVIOUS SPECIALTY XLG/XLONG 32474

## (undated) DEVICE — GLOVE BIOGEL PI MICRO SZ 7.0 48570

## (undated) DEVICE — LABEL MEDICATION SYSTEM 3303-P

## (undated) DEVICE — GLOVE PROTEXIS W/NEU-THERA 6.5  2D73TE65

## (undated) DEVICE — POSITIONER ARMBOARD FOAM 1PAIR LF FP-ARMB1

## (undated) DEVICE — CATH TRAY URETHRAL 14FR LF 772417

## (undated) DEVICE — PAD CHUX UNDERPAD 30X36" P3036C

## (undated) DEVICE — SUCTION TIP YANKAUER W/O VENT K86

## (undated) DEVICE — SU CHROMIC 3-0 FS-2 27" 636

## (undated) DEVICE — PREP POVIDONE IODINE 10% SWABSTICKS TRIPLE PACK AS-PVPSBPT

## (undated) DEVICE — STRAP KNEE/BODY 31143004

## (undated) DEVICE — SYR EAR 3OZ BULB IRR STRL DISP BLU PVC 4173

## (undated) RX ORDER — FENTANYL CITRATE 50 UG/ML
INJECTION, SOLUTION INTRAMUSCULAR; INTRAVENOUS
Status: DISPENSED
Start: 2024-03-13

## (undated) RX ORDER — OXYMETAZOLINE HYDROCHLORIDE 0.05 G/100ML
SPRAY NASAL
Status: DISPENSED
Start: 2024-03-13

## (undated) RX ORDER — DEXAMETHASONE SODIUM PHOSPHATE 4 MG/ML
INJECTION, SOLUTION INTRA-ARTICULAR; INTRALESIONAL; INTRAMUSCULAR; INTRAVENOUS; SOFT TISSUE
Status: DISPENSED
Start: 2018-01-04

## (undated) RX ORDER — ONDANSETRON 2 MG/ML
INJECTION INTRAMUSCULAR; INTRAVENOUS
Status: DISPENSED
Start: 2018-01-04

## (undated) RX ORDER — FENTANYL CITRATE 50 UG/ML
INJECTION, SOLUTION INTRAMUSCULAR; INTRAVENOUS
Status: DISPENSED
Start: 2018-01-04

## (undated) RX ORDER — CHLORHEXIDINE GLUCONATE ORAL RINSE 1.2 MG/ML
SOLUTION DENTAL
Status: DISPENSED
Start: 2024-03-13

## (undated) RX ORDER — KETOROLAC TROMETHAMINE 30 MG/ML
INJECTION, SOLUTION INTRAMUSCULAR; INTRAVENOUS
Status: DISPENSED
Start: 2024-03-13

## (undated) RX ORDER — PROPOFOL 10 MG/ML
INJECTION, EMULSION INTRAVENOUS
Status: DISPENSED
Start: 2018-01-04

## (undated) RX ORDER — PROPOFOL 10 MG/ML
INJECTION, EMULSION INTRAVENOUS
Status: DISPENSED
Start: 2024-03-13

## (undated) RX ORDER — DEXAMETHASONE SODIUM PHOSPHATE 4 MG/ML
INJECTION, SOLUTION INTRA-ARTICULAR; INTRALESIONAL; INTRAMUSCULAR; INTRAVENOUS; SOFT TISSUE
Status: DISPENSED
Start: 2024-03-13

## (undated) RX ORDER — FENTANYL CITRATE 50 UG/ML
INJECTION, SOLUTION INTRAMUSCULAR; INTRAVENOUS
Status: DISPENSED
Start: 2024-03-01

## (undated) RX ORDER — CEFAZOLIN SODIUM 2 G/100ML
INJECTION, SOLUTION INTRAVENOUS
Status: DISPENSED
Start: 2018-01-04

## (undated) RX ORDER — OXYMETAZOLINE HYDROCHLORIDE 0.05 G/100ML
SPRAY NASAL
Status: DISPENSED
Start: 2018-01-04

## (undated) RX ORDER — LIDOCAINE HYDROCHLORIDE 10 MG/ML
INJECTION, SOLUTION EPIDURAL; INFILTRATION; INTRACAUDAL; PERINEURAL
Status: DISPENSED
Start: 2024-03-01

## (undated) RX ORDER — KETOROLAC TROMETHAMINE 30 MG/ML
INJECTION, SOLUTION INTRAMUSCULAR; INTRAVENOUS
Status: DISPENSED
Start: 2018-01-04

## (undated) RX ORDER — ONDANSETRON 2 MG/ML
INJECTION INTRAMUSCULAR; INTRAVENOUS
Status: DISPENSED
Start: 2024-03-13